# Patient Record
Sex: FEMALE | Race: WHITE | NOT HISPANIC OR LATINO | Employment: OTHER | URBAN - METROPOLITAN AREA
[De-identification: names, ages, dates, MRNs, and addresses within clinical notes are randomized per-mention and may not be internally consistent; named-entity substitution may affect disease eponyms.]

---

## 2017-01-25 ENCOUNTER — OFFICE VISIT (OUTPATIENT)
Dept: FAMILY MEDICINE | Facility: CLINIC | Age: 72
End: 2017-01-25
Payer: COMMERCIAL

## 2017-01-25 ENCOUNTER — HOSPITAL ENCOUNTER (OUTPATIENT)
Dept: MAMMOGRAPHY | Facility: CLINIC | Age: 72
Discharge: HOME | End: 2017-01-25
Attending: INTERNAL MEDICINE | Admitting: INTERNAL MEDICINE
Payer: COMMERCIAL

## 2017-01-25 VITALS
WEIGHT: 186 LBS | RESPIRATION RATE: 20 BRPM | BODY MASS INDEX: 30.99 KG/M2 | TEMPERATURE: 96.6 F | SYSTOLIC BLOOD PRESSURE: 127 MMHG | DIASTOLIC BLOOD PRESSURE: 70 MMHG | HEART RATE: 50 BPM | HEIGHT: 65 IN

## 2017-01-25 DIAGNOSIS — E03.9 ACQUIRED HYPOTHYROIDISM: ICD-10-CM

## 2017-01-25 DIAGNOSIS — K21.9 GASTROESOPHAGEAL REFLUX DISEASE WITHOUT ESOPHAGITIS: ICD-10-CM

## 2017-01-25 DIAGNOSIS — Z12.11 SCREEN FOR COLON CANCER: ICD-10-CM

## 2017-01-25 DIAGNOSIS — Z23 NEED FOR PROPHYLACTIC VACCINATION WITH COMBINED DIPHTHERIA-TETANUS-PERTUSSIS (DTP) VACCINE: ICD-10-CM

## 2017-01-25 DIAGNOSIS — Z23 NEED FOR PROPHYLACTIC VACCINATION AND INOCULATION AGAINST INFLUENZA: ICD-10-CM

## 2017-01-25 DIAGNOSIS — I10 BENIGN ESSENTIAL HYPERTENSION: Primary | Chronic | ICD-10-CM

## 2017-01-25 DIAGNOSIS — F41.1 GAD (GENERALIZED ANXIETY DISORDER): ICD-10-CM

## 2017-01-25 DIAGNOSIS — R53.82 CHRONIC FATIGUE: ICD-10-CM

## 2017-01-25 DIAGNOSIS — I25.10 CORONARY ARTERY DISEASE INVOLVING NATIVE CORONARY ARTERY OF NATIVE HEART WITHOUT ANGINA PECTORIS: Chronic | ICD-10-CM

## 2017-01-25 DIAGNOSIS — Z12.31 ENCOUNTER FOR SCREENING MAMMOGRAM FOR MALIGNANT NEOPLASM OF BREAST: ICD-10-CM

## 2017-01-25 PROCEDURE — 90715 TDAP VACCINE 7 YRS/> IM: CPT | Performed by: INTERNAL MEDICINE

## 2017-01-25 PROCEDURE — 99214 OFFICE O/P EST MOD 30 MIN: CPT | Mod: 25 | Performed by: INTERNAL MEDICINE

## 2017-01-25 PROCEDURE — G0008 ADMIN INFLUENZA VIRUS VAC: HCPCS | Performed by: INTERNAL MEDICINE

## 2017-01-25 PROCEDURE — G0202 SCR MAMMO BI INCL CAD: HCPCS

## 2017-01-25 PROCEDURE — 90472 IMMUNIZATION ADMIN EACH ADD: CPT | Performed by: INTERNAL MEDICINE

## 2017-01-25 PROCEDURE — 90688 IIV4 VACCINE SPLT 0.5 ML IM: CPT | Performed by: INTERNAL MEDICINE

## 2017-01-25 RX ORDER — CLONAZEPAM 0.5 MG/1
0.5 TABLET ORAL DAILY PRN
Qty: 30 TABLET | Refills: 5 | Status: SHIPPED | OUTPATIENT
Start: 2017-01-25 | End: 2017-05-09

## 2017-01-25 RX ORDER — METOPROLOL SUCCINATE 25 MG/1
25 TABLET, EXTENDED RELEASE ORAL DAILY
Qty: 90 TABLET | Refills: 3 | Status: SHIPPED | OUTPATIENT
Start: 2017-01-25 | End: 2018-02-03

## 2017-01-25 RX ORDER — VITAMIN E 268 MG
CAPSULE ORAL
COMMUNITY
End: 2019-12-10

## 2017-01-25 RX ORDER — LEVOTHYROXINE SODIUM 88 UG/1
88 TABLET ORAL DAILY
Qty: 90 TABLET | Refills: 3 | Status: SHIPPED | OUTPATIENT
Start: 2017-01-25 | End: 2018-03-20

## 2017-01-25 RX ORDER — LISINOPRIL 40 MG/1
40 TABLET ORAL DAILY
Qty: 90 TABLET | Refills: 3 | Status: SHIPPED | OUTPATIENT
Start: 2017-01-25 | End: 2017-03-21 | Stop reason: ALTCHOICE

## 2017-01-25 ASSESSMENT — ANXIETY QUESTIONNAIRES
7. FEELING AFRAID AS IF SOMETHING AWFUL MIGHT HAPPEN: NOT AT ALL
GAD7 TOTAL SCORE: 6
3. WORRYING TOO MUCH ABOUT DIFFERENT THINGS: MORE THAN HALF THE DAYS
1. FEELING NERVOUS, ANXIOUS, OR ON EDGE: SEVERAL DAYS
5. BEING SO RESTLESS THAT IT IS HARD TO SIT STILL: NOT AT ALL
2. NOT BEING ABLE TO STOP OR CONTROL WORRYING: SEVERAL DAYS
IF YOU CHECKED OFF ANY PROBLEMS ON THIS QUESTIONNAIRE, HOW DIFFICULT HAVE THESE PROBLEMS MADE IT FOR YOU TO DO YOUR WORK, TAKE CARE OF THINGS AT HOME, OR GET ALONG WITH OTHER PEOPLE: SOMEWHAT DIFFICULT
6. BECOMING EASILY ANNOYED OR IRRITABLE: SEVERAL DAYS

## 2017-01-25 ASSESSMENT — PATIENT HEALTH QUESTIONNAIRE - PHQ9: 5. POOR APPETITE OR OVEREATING: SEVERAL DAYS

## 2017-01-25 NOTE — PROGRESS NOTES
SUBJECTIVE:                                                    Meryl Fournier is a 71 year old female who presents to clinic today for the following health issues:      Hypertension Follow-up      Outpatient blood pressures are being checked at home.  Results are 135-145.    Low Salt Diet: no added salt       Amount of exercise or physical activity: 4-5 days/week for an average of 15-30 minutes    Problems taking medications regularly: No    Medication side effects: none    Diet: low salt  Statin:  Has been on pravastatin (2012), simvastatin (2006), lovastatin (2015), atorvastatin ( 0397-5806 or so).  She had vomiting on lovastatin.  She had a re-trial of this 3 days later and had vomiting again.  lipitor caused myalgias.  --she reports blood pressure has been higher at home, 140s, in last several months.  She never misses a dose.        Chief Complaint   Patient presents with     Recheck Medication     Flu Shot     flu and tetnus   Biggest complaint is fatigue all of 2016.  After eats falls asleep.  Last A1C 2012  --she is not doing dedicated physical exercise.   --she is sleeping well.    --she reports having hard year 2016 'worst year of my life'.  She did not want to discuss in detail.  --she will occasionally have a 'dry feeling' in her chest with mowing lawn, lasts 5 min.  This occurs rarely. Never occurs with rest.  It is associated with shortness of breath but not diaphoresis or nausea.      Current Outpatient Prescriptions   Medication Sig Dispense Refill     Coenzyme Q10 (COQ10 PO) Take 100 mg by mouth       Flaxseed, Linseed, (FLAXSEED OIL) 1000 MG CAPS        clonazePAM (KLONOPIN) 0.5 MG tablet Take 1 tablet (0.5 mg) by mouth daily as needed for anxiety 30 tablet 5     metoprolol (TOPROL-XL) 25 MG 24 hr tablet Take 1 tablet (25 mg) by mouth daily 90 tablet 3     lisinopril (PRINIVIL,ZESTRIL) 20 MG tablet Take 1 tablet (20 mg) by mouth daily 90 tablet 3     ranitidine (ZANTAC) 150 MG tablet Take 1  "tablet (150 mg) by mouth 2 times daily 60 tablet 11     levothyroxine (SYNTHROID, LEVOTHROID) 88 MCG tablet Take 1 tablet (88 mcg) by mouth daily 90 tablet 3     aspirin 81 MG tablet Take  by mouth daily.  3     cholecalciferol 5000 UNITS CAPS Take 1 tablet by mouth daily. 30 capsule      Krill Oil 300 MG CAPS Take 1 tablet by mouth daily.       STATIN NOT PRESCRIBED, INTENTIONAL, Statin not prescribed intentionally due to Intolerance (with supporting documentation of trying a statin at least once within the last 5 years) 0 each 0     Problem list, Medication list, Allergies, and Medical/Social/Surgical histories reviewed in [facility] and updated as appropriate.    ROS:  Constitutional, HEENT, cardiovascular, pulmonary, gi and gu systems are negative, except as otherwise noted.    OBJECTIVE:                                                    /78 mmHg  Pulse 62  Temp(Src) 96.6  F (35.9  C) (Tympanic)  Resp 20  Ht 5' 5\" (1.651 m)  Wt 186 lb (84.369 kg)  BMI 30.95 kg/m2  Body mass index is 30.95 kg/(m^2).  GENERAL APPEARANCE: healthy, alert and no distress  HENT: ear canals and TM's normal and nose and mouth without ulcers or lesions  NECK: no adenopathy, no asymmetry, masses, or scars and thyroid normal to palpation  RESP: lungs clear to auscultation - no rales, rhonchi or wheezes  CV: regular rates and rhythm, normal S1 S2, no S3 or S4 and no murmur, click or rub  LYMPHATICS: trace pedal edema  MS: extremities normal- no gross deformities noted and 5/5 strength bilateral ue/le  NEURO: Normal strength and tone, mentation intact, speech normal, DTR symmetrically normal in lower extremities and gait normal including heel/toe/tandem walking       ASSESSMENT/PLAN:                                                        ICD-10-CM    1. Benign essential hypertension I10 lisinopril (PRINIVIL/ZESTRIL) 40 MG tablet     metoprolol (TOPROL-XL) 25 MG 24 hr tablet   2. Chronic fatigue R53.82    3. Gastroesophageal reflux " disease without esophagitis K21.9 ranitidine (ZANTAC) 150 MG tablet   4. BRISSA (generalized anxiety disorder) F41.1 clonazePAM (KLONOPIN) 0.5 MG tablet   5. Coronary artery disease involving native coronary artery of native heart without angina pectoris I25.10 Basic metabolic panel  (Ca, Cl, CO2, Creat, Gluc, K, Na, BUN)     Lipid Profile (Chol, Trig, HDL, LDL calc)   6. Acquired hypothyroidism E03.9 levothyroxine (SYNTHROID/LEVOTHROID) 88 MCG tablet     TSH with free T4 reflex   7. Screen for colon cancer Z12.11 Fecal colorectal cancer screen (FIT)   8. Need for prophylactic vaccination and inoculation against influenza Z23 FLU VACCINE, 3 YRS +, IM (QUADRIVALENT W/PRESERVATIVES/MULTI-DOSE) [86463]     Vaccine Administration, Initial [31584]   9. Need for prophylactic vaccination with combined diphtheria-tetanus-pertussis (DTP) vaccine Z23 TDAP (ADACEL AGES 11-64)     EA ADD'L VACCINE       1. You are due for a mammogram.  Please call [phone] to schedule.  2. You are due for FIT test for colon cancer screening.  3. Increase lisinopril to 40 mg once daily.    4. Your blood pressure was elevated today.  Please come back to clinic in 2 weeks for (free) Nurse visit to recheck blood pressure.  Please make appointment at the  on your way out today.  Get blood on this day, fasting, to check kidneys and cholesterol.  Drink lots of water prior to blood work.  5. If heartburn flares up severe, can try 1-4 weeks of prilosec (stop ranitidine), then go back to ranitidine  6. For indigestion, try eating smaller more frequent meals.   7. For the fatigue, do dedicated physical exercise, at moderate rate, most days of the week.    Nora Hernandez, DO  [facility]      Injectable Influenza Immunization Documentation    1.  Is the person to be vaccinated sick today?  No    2. Does the person to be vaccinated have an allergy to eggs or to a component of the vaccine?  No    3. Has the person to be  vaccinated today ever had a serious reaction to influenza vaccine in the past?  No    4. Has the person to be vaccinated ever had Guillain-Clinton syndrome?  No     Form completed by Munira Vieira

## 2017-01-25 NOTE — NURSING NOTE
"Chief Complaint   Patient presents with     Recheck Medication     Flu Shot     flu and tetnus       Initial /78 mmHg  Pulse 62  Temp(Src) 96.6  F (35.9  C) (Tympanic)  Resp 20  Ht 5' 5\" (1.651 m)  Wt 186 lb (84.369 kg)  BMI 30.95 kg/m2 Estimated body mass index is 30.95 kg/(m^2) as calculated from the following:    Height as of this encounter: 5' 5\" (1.651 m).    Weight as of this encounter: 186 lb (84.369 kg).  BP completed using cuff size: large  "

## 2017-01-25 NOTE — PATIENT INSTRUCTIONS
Thank you for choosing [facility].  You may be receiving a survey in the mail from Neymar Cunningham regarding your visit today.  Please take a few minutes to complete and return the survey to let us know how we are doing.      If you have questions or concerns, please contact us via [facility] or you can contact your care team at [phone].    Our Clinic hours are:  Monday 6:40 am  to 7:00 pm  Tuesday -Friday 6:40 am to 5:00 pm    The [place] outpatient lab hours are:  Monday - Friday 6:10 am to 4:45 pm  Saturdays 7:00 am to 11:00 am  Appointments are required, call [phone]    If you have clinical questions after hours or would like to schedule an appointment,  call the clinic at [phone].      [facility]      1. You are due for a mammogram.  Please call [phone] to schedule.  2. You are due for FIT test for colon cancer screening.  3. Increase lisinopril to 40 mg once daily.    4. Your blood pressure was elevated today.  Please come back to clinic in 2 weeks for (free) Nurse visit to recheck blood pressure.  Please make appointment at the  on your way out today.  Get blood on this day, fasting, to check kidneys and cholesterol.  Drink lots of water prior to blood work.  5. If heartburn flares up severe, can try 1-4 weeks of prilosec (stop ranitidine), then go back to ranitidine  6. For indigestion, try eating smaller more frequent meals.   7. For the fatigue, do dedicated physical exercise, at moderate rate, most days of the week.

## 2017-01-26 ASSESSMENT — PATIENT HEALTH QUESTIONNAIRE - PHQ9: SUM OF ALL RESPONSES TO PHQ QUESTIONS 1-9: 6

## 2017-01-26 ASSESSMENT — ANXIETY QUESTIONNAIRES: GAD7 TOTAL SCORE: 6

## 2017-02-14 ENCOUNTER — ALLIED HEALTH/NURSE VISIT (OUTPATIENT)
Dept: FAMILY MEDICINE | Facility: CLINIC | Age: 72
End: 2017-02-14
Payer: COMMERCIAL

## 2017-02-14 VITALS — HEART RATE: 53 BPM | OXYGEN SATURATION: 99 % | DIASTOLIC BLOOD PRESSURE: 72 MMHG | SYSTOLIC BLOOD PRESSURE: 122 MMHG

## 2017-02-14 DIAGNOSIS — I10 BENIGN ESSENTIAL HYPERTENSION: Primary | ICD-10-CM

## 2017-02-14 DIAGNOSIS — E03.9 ACQUIRED HYPOTHYROIDISM: ICD-10-CM

## 2017-02-14 DIAGNOSIS — I25.10 CORONARY ARTERY DISEASE INVOLVING NATIVE CORONARY ARTERY OF NATIVE HEART WITHOUT ANGINA PECTORIS: Chronic | ICD-10-CM

## 2017-02-14 DIAGNOSIS — E87.5 SERUM POTASSIUM ELEVATED: Primary | ICD-10-CM

## 2017-02-14 LAB
ANION GAP SERPL CALCULATED.3IONS-SCNC: 4 MMOL/L (ref 3–14)
BUN SERPL-MCNC: 19 MG/DL (ref 7–30)
CALCIUM SERPL-MCNC: 9.4 MG/DL (ref 8.5–10.1)
CHLORIDE SERPL-SCNC: 108 MMOL/L (ref 94–109)
CHOLEST SERPL-MCNC: 252 MG/DL
CO2 SERPL-SCNC: 30 MMOL/L (ref 20–32)
CREAT SERPL-MCNC: 1.39 MG/DL (ref 0.52–1.04)
GFR SERPL CREATININE-BSD FRML MDRD: 37 ML/MIN/1.7M2
GLUCOSE SERPL-MCNC: 107 MG/DL (ref 70–99)
HDLC SERPL-MCNC: 42 MG/DL
LDLC SERPL CALC-MCNC: 160 MG/DL
NONHDLC SERPL-MCNC: 210 MG/DL
POTASSIUM SERPL-SCNC: 5.5 MMOL/L (ref 3.4–5.3)
SODIUM SERPL-SCNC: 142 MMOL/L (ref 133–144)
TRIGL SERPL-MCNC: 252 MG/DL
TSH SERPL DL<=0.005 MIU/L-ACNC: 3.89 MU/L (ref 0.4–4)

## 2017-02-14 PROCEDURE — 80061 LIPID PANEL: CPT | Performed by: INTERNAL MEDICINE

## 2017-02-14 PROCEDURE — 36415 COLL VENOUS BLD VENIPUNCTURE: CPT | Performed by: INTERNAL MEDICINE

## 2017-02-14 PROCEDURE — 80048 BASIC METABOLIC PNL TOTAL CA: CPT | Performed by: INTERNAL MEDICINE

## 2017-02-14 PROCEDURE — 99207 ZZC NO CHARGE NURSE ONLY: CPT

## 2017-02-14 PROCEDURE — 84443 ASSAY THYROID STIM HORMONE: CPT | Performed by: INTERNAL MEDICINE

## 2017-02-14 NOTE — NURSING NOTE
Patient here for blood pressure check.   BP Readings from Last 6 Encounters:   02/14/17 122/72   01/25/17 127/70   07/08/16 143/72   03/02/16 132/75   02/16/16 137/79   12/10/15 150/84     She will follow up in clinic as suggested by PCP.     Laura Squires RN

## 2017-02-14 NOTE — PROGRESS NOTES
The potassium was just a little high. Certain medications like the lisinopril can do this, and it appears that Dr. Hernandez increased this recently to 40mg.  Please make certain not to take any additional potassium supplements. I would recommend you make a lab only visit by the end of the week or by early next week to recheck this and if it is still high make a clinic visit with Dr. Hernandez to talk about options like decreasing the lisinopril back down or adding a different medication.    The kidney Creatinine test is about the same as last time.    The cholesterol is high but a little better than last year.    Covering for provider  Heber Dominguez MD  [facility]

## 2017-02-14 NOTE — MR AVS SNAPSHOT
"              After Visit Summary   2017    Meryl Fournier    MRN: [id number]           Patient Information     Date Of Birth          1945        Visit Information        Provider Department      2017 9:00 AM RENY WALLS/YANETH KUHN [facility]        Today's Diagnoses     Benign essential hypertension    -  1       Follow-ups after your visit        Who to contact     If you have questions or need follow up information about today's clinic visit or your schedule please contact [facility] directly at [phone].  Normal or non-critical lab and imaging results will be communicated to you by MyChart, letter or phone within 4 business days after the clinic has received the results. If you do not hear from us within 7 days, please contact the clinic through [employer] or phone. If you have a critical or abnormal lab result, we will notify you by phone as soon as possible.  Submit refill requests through Lively or call your pharmacy and they will forward the refill request to us. Please allow 3 business days for your refill to be completed.          Additional Information About Your Visit        MyChart Information     Lively lets you send messages to your doctor, view your test results, renew your prescriptions, schedule appointments and more. To sign up, go to [email or web address] . Click on \"Log in\" on the left side of the screen, which will take you to the Welcome page. Then click on \"Sign up Now\" on the right side of the page.     You will be asked to enter the access code listed below, as well as some personal information. Please follow the directions to create your username and password.     Your access code is: [email or web address]  Expires: 2017 10:43 AM     Your access code will  in 90 days. If you need help or a new code, please call your [facility] or [phone].        Care EveryWhere ID     This is your Care EveryWhere ID. This could be used by other " organizations to access your [place] medical records  [id number]        Your Vitals Were     Pulse Pulse Oximetry                53 99%           Blood Pressure from Last 3 Encounters:   02/14/17 122/72   01/25/17 127/70   07/08/16 143/72    Weight from Last 3 Encounters:   01/25/17 186 lb (84.4 kg)   07/08/16 183 lb (83 kg)   03/02/16 178 lb (80.7 kg)              Today, you had the following     No orders found for display       Primary Care Provider Office Phone # Fax #    Nora Hernandez [phone]       [facility] [address] [facility] [address] [facility] [address]        Thank you!     Thank you for choosing [facility]  for your care. Our goal is always to provide you with excellent care. Hearing back from our patients is one way we can continue to improve our services. Please take a few minutes to complete the written survey that you may receive in the mail after your visit with us. Thank you!             Your Updated Medication List - Protect others around you: Learn how to safely use, store and throw away your medicines at www.disposemymeds.org.          This list is accurate as of: 2/14/17 10:23 AM.  Always use your most recent med list.                   Brand Name Dispense Instructions for use    aspirin 81 MG tablet      Take  by mouth daily.       cholecalciferol 5000 UNITS Caps     30 capsule    Take 1 tablet by mouth daily.       clonazePAM 0.5 MG tablet    klonoPIN    30 tablet    Take 1 tablet (0.5 mg) by mouth daily as needed for anxiety       COQ10 PO      Take 100 mg by mouth       flaxseed oil 1000 MG Caps          Krill Oil 300 MG Caps      Take 1 tablet by mouth daily.       levothyroxine 88 MCG tablet    SYNTHROID/LEVOTHROID    90 tablet    Take 1 tablet (88 mcg) by mouth daily       lisinopril 40 MG tablet    PRINIVIL/ZESTRIL    90 tablet    Take 1 tablet (40 mg) by mouth daily       metoprolol 25 MG 24 hr tablet    TOPROL-XL    90 tablet    Take 1  tablet (25 mg) by mouth daily       ranitidine 150 MG tablet    ZANTAC    180 tablet    Take 1 tablet (150 mg) by mouth 2 times daily       STATIN NOT PRESCRIBED (INTENTIONAL)     0 each    Statin not prescribed intentionally due to Intolerance (with supporting documentation of trying a statin at least once within the last 5 years)

## 2017-02-17 DIAGNOSIS — E87.5 SERUM POTASSIUM ELEVATED: ICD-10-CM

## 2017-02-17 LAB
ANION GAP SERPL CALCULATED.3IONS-SCNC: 3 MMOL/L (ref 3–14)
BUN SERPL-MCNC: 20 MG/DL (ref 7–30)
CALCIUM SERPL-MCNC: 9.5 MG/DL (ref 8.5–10.1)
CHLORIDE SERPL-SCNC: 104 MMOL/L (ref 94–109)
CO2 SERPL-SCNC: 31 MMOL/L (ref 20–32)
CREAT SERPL-MCNC: 1.33 MG/DL (ref 0.52–1.04)
GFR SERPL CREATININE-BSD FRML MDRD: 39 ML/MIN/1.7M2
GLUCOSE SERPL-MCNC: 98 MG/DL (ref 70–99)
POTASSIUM SERPL-SCNC: 5.6 MMOL/L (ref 3.4–5.3)
SODIUM SERPL-SCNC: 138 MMOL/L (ref 133–144)

## 2017-02-17 PROCEDURE — 36415 COLL VENOUS BLD VENIPUNCTURE: CPT | Performed by: INTERNAL MEDICINE

## 2017-02-17 PROCEDURE — 80048 BASIC METABOLIC PNL TOTAL CA: CPT | Performed by: INTERNAL MEDICINE

## 2017-03-21 ENCOUNTER — OFFICE VISIT (OUTPATIENT)
Dept: FAMILY MEDICINE | Facility: CLINIC | Age: 72
End: 2017-03-21
Payer: COMMERCIAL

## 2017-03-21 VITALS
WEIGHT: 191 LBS | HEART RATE: 57 BPM | TEMPERATURE: 96.6 F | SYSTOLIC BLOOD PRESSURE: 140 MMHG | BODY MASS INDEX: 31.82 KG/M2 | DIASTOLIC BLOOD PRESSURE: 78 MMHG | HEIGHT: 65 IN

## 2017-03-21 DIAGNOSIS — L65.9 HAIR LOSS: ICD-10-CM

## 2017-03-21 DIAGNOSIS — I10 BENIGN ESSENTIAL HYPERTENSION: Primary | Chronic | ICD-10-CM

## 2017-03-21 LAB
ANION GAP SERPL CALCULATED.3IONS-SCNC: 5 MMOL/L (ref 3–14)
BUN SERPL-MCNC: 17 MG/DL (ref 7–30)
CALCIUM SERPL-MCNC: 9.1 MG/DL (ref 8.5–10.1)
CHLORIDE SERPL-SCNC: 107 MMOL/L (ref 94–109)
CO2 SERPL-SCNC: 29 MMOL/L (ref 20–32)
CREAT SERPL-MCNC: 1.45 MG/DL (ref 0.52–1.04)
GFR SERPL CREATININE-BSD FRML MDRD: 35 ML/MIN/1.7M2
GLUCOSE SERPL-MCNC: 107 MG/DL (ref 70–99)
POTASSIUM SERPL-SCNC: 4.5 MMOL/L (ref 3.4–5.3)
SODIUM SERPL-SCNC: 141 MMOL/L (ref 133–144)

## 2017-03-21 PROCEDURE — 99213 OFFICE O/P EST LOW 20 MIN: CPT | Performed by: INTERNAL MEDICINE

## 2017-03-21 PROCEDURE — 80048 BASIC METABOLIC PNL TOTAL CA: CPT | Performed by: INTERNAL MEDICINE

## 2017-03-21 PROCEDURE — 36415 COLL VENOUS BLD VENIPUNCTURE: CPT | Performed by: INTERNAL MEDICINE

## 2017-03-21 RX ORDER — LISINOPRIL AND HYDROCHLOROTHIAZIDE 12.5; 2 MG/1; MG/1
1 TABLET ORAL DAILY
Qty: 90 TABLET | Refills: 3 | Status: SHIPPED | OUTPATIENT
Start: 2017-03-21 | End: 2018-02-25

## 2017-03-21 NOTE — NURSING NOTE
"Chief Complaint   Patient presents with     Lab Result Notice     recheck potassium and go over thyroid (still having symptoms of hair falling out)       Initial /78 (BP Location: Left arm, Patient Position: Chair, Cuff Size: Adult Large)  Pulse 57  Temp 96.6  F (35.9  C) (Tympanic)  Ht 5' 5\" (1.651 m)  Wt 191 lb (86.6 kg)  BMI 31.78 kg/m2 Estimated body mass index is 31.78 kg/(m^2) as calculated from the following:    Height as of this encounter: 5' 5\" (1.651 m).    Weight as of this encounter: 191 lb (86.6 kg).  Medication Reconciliation: complete  "

## 2017-03-21 NOTE — MR AVS SNAPSHOT
"              After Visit Summary   3/21/2017    Meryl Fournier    MRN: [id number]           Patient Information     Date Of Birth          1945        Visit Information        Provider Department      3/21/2017 9:40 AM Nora Hernandez, DO [facility]        Today's Diagnoses     Benign essential hypertension    -  1    Hair loss          Care Instructions    1. Consider seeing Derm for hair loss concerns if they continue  2. Stop lisinopril.  Start combo pill of lisinopril/HCTZ once daily.  In 2-4 weeks, see RN for free blood pressure check, and get blood work to recheck on potassium.        Follow-ups after your visit        Future tests that were ordered for you today     Open Future Orders        Priority Expected Expires Ordered    Basic metabolic panel Routine  3/21/2018 3/21/2017            Who to contact     If you have questions or need follow up information about today's clinic visit or your schedule please contact [facility] directly at [phone].  Normal or non-critical lab and imaging results will be communicated to you by [email or web address], letter or phone within 4 business days after the clinic has received the results. If you do not hear from us within 7 days, please contact the clinic through eyetokt or phone. If you have a critical or abnormal lab result, we will notify you by phone as soon as possible.  Submit refill requests through [employer] or call your pharmacy and they will forward the refill request to us. Please allow 3 business days for your refill to be completed.          Additional Information About Your Visit        [email or web address] Information     ObjectVideo lets you send messages to your doctor, view your test results, renew your prescriptions, schedule appointments and more. To sign up, go to [email or web address] . Click on \"Log in\" on the left side of the screen, which will take you to the Welcome page. Then click on \"Sign up Now\" on the right side of the page. " "    You will be asked to enter the access code listed below, as well as some personal information. Please follow the directions to create your username and password.     Your access code is: [email or web address]  Expires: 2017 11:43 AM     Your access code will  in 90 days. If you need help or a new code, please call your [place] clinic or [phone].        Care EveryWhere ID     This is your Care EveryWhere ID. This could be used by other organizations to access your [place] medical records  [id number]        Your Vitals Were     Pulse Temperature Height BMI (Body Mass Index)          57 96.6  F (35.9  C) (Tympanic) 5' 5\" (1.651 m) 31.78 kg/m2         Blood Pressure from Last 3 Encounters:   17 140/78   17 122/72   17 127/70    Weight from Last 3 Encounters:   17 191 lb (86.6 kg)   17 186 lb (84.4 kg)   16 183 lb (83 kg)              We Performed the Following     Basic metabolic panel  (Ca, Cl, CO2, Creat, Gluc, K, Na, BUN)          Today's Medication Changes          These changes are accurate as of: 3/21/17 10:05 AM.  If you have any questions, ask your nurse or doctor.               Start taking these medicines.        Dose/Directions    lisinopril-hydrochlorothiazide 20-12.5 MG per tablet   Commonly known as:  PRINZIDE/ZESTORETIC   Used for:  Benign essential hypertension   Started by:  Nora Hernandez DO        Dose:  1 tablet   Take 1 tablet by mouth daily   Quantity:  90 tablet   Refills:  3         Stop taking these medicines if you haven't already. Please contact your care team if you have questions.     lisinopril 40 MG tablet   Commonly known as:  PRINIVIL/ZESTRIL   Stopped by:  Nora Hernandez DO                Where to get your medicines      These medications were sent to [id number] [place] [address], [facility] [address]     Phone:  [phone]     " lisinopril-hydrochlorothiazide 20-12.5 MG per tablet                Primary Care Provider Office Phone # Fax #    Nora Hernandez [phone]       [facility] [address] [facility] [address]        Thank you!     Thank you for choosing [facility]  for your care. Our goal is always to provide you with excellent care. Hearing back from our patients is one way we can continue to improve our services. Please take a few minutes to complete the written survey that you may receive in the mail after your visit with us. Thank you!             Your Updated Medication List - Protect others around you: Learn how to safely use, store and throw away your medicines at www.disposemymeds.org.          This list is accurate as of: 3/21/17 10:05 AM.  Always use your most recent med list.                   Brand Name Dispense Instructions for use    aspirin 81 MG tablet      Take  by mouth daily.       cholecalciferol 5000 UNITS Caps     30 capsule    Take 1 tablet by mouth daily.       clonazePAM 0.5 MG tablet    klonoPIN    30 tablet    Take 1 tablet (0.5 mg) by mouth daily as needed for anxiety       COQ10 PO      Take 100 mg by mouth       flaxseed oil 1000 MG Caps          Krill Oil 300 MG Caps      Take 1 tablet by mouth daily.       levothyroxine 88 MCG tablet    SYNTHROID/LEVOTHROID    90 tablet    Take 1 tablet (88 mcg) by mouth daily       lisinopril-hydrochlorothiazide 20-12.5 MG per tablet    PRINZIDE/ZESTORETIC    90 tablet    Take 1 tablet by mouth daily       metoprolol 25 MG 24 hr tablet    TOPROL-XL    90 tablet    Take 1 tablet (25 mg) by mouth daily       ranitidine 150 MG tablet    ZANTAC    180 tablet    Take 1 tablet (150 mg) by mouth 2 times daily       STATIN NOT PRESCRIBED (INTENTIONAL)     0 each    Statin not prescribed intentionally due to Intolerance (with supporting documentation of trying a statin at least once within the last 5 years)

## 2017-03-21 NOTE — PROGRESS NOTES
SUBJECTIVE:                                                    Meryl Fournier is a 71 year old female who presents to clinic today for the following health issues:      Chief Complaint   Patient presents with     Lab Result Notice     recheck potassium and go over thyroid (still having symptoms of hair falling out)   concerns for thyroid:  Last visit in Jan, she thought she was having thyroid due to hair falling out.  TSH was normal.  She is concerned about weight gain.  She has changed her diet, cut out sugar, increased exercise.  She is walking primarily for exercise. And knows she would not join a gym.  Wt Readings from Last 5 Encounters:   03/21/17 191 lb (86.6 kg)   01/25/17 186 lb (84.4 kg)   07/08/16 183 lb (83 kg)   03/02/16 178 lb (80.7 kg)   02/16/16 182 lb (82.6 kg)     Hypertension:  Blood pressure uncontrolled, so lisinopril was increased from 20 to 40 mg.  She has had mild hyperkalemia with stable kidney function since then.      Current Outpatient Prescriptions   Medication Sig Dispense Refill     Coenzyme Q10 (COQ10 PO) Take 100 mg by mouth       Flaxseed, Linseed, (FLAXSEED OIL) 1000 MG CAPS        levothyroxine (SYNTHROID/LEVOTHROID) 88 MCG tablet Take 1 tablet (88 mcg) by mouth daily 90 tablet 3     ranitidine (ZANTAC) 150 MG tablet Take 1 tablet (150 mg) by mouth 2 times daily 180 tablet 3     lisinopril (PRINIVIL/ZESTRIL) 40 MG tablet Take 1 tablet (40 mg) by mouth daily 90 tablet 3     metoprolol (TOPROL-XL) 25 MG 24 hr tablet Take 1 tablet (25 mg) by mouth daily 90 tablet 3     clonazePAM (KLONOPIN) 0.5 MG tablet Take 1 tablet (0.5 mg) by mouth daily as needed for anxiety 30 tablet 5     aspirin 81 MG tablet Take  by mouth daily.  3     cholecalciferol 5000 UNITS CAPS Take 1 tablet by mouth daily. 30 capsule      Krill Oil 300 MG CAPS Take 1 tablet by mouth daily.       STATIN NOT PRESCRIBED, INTENTIONAL, Statin not prescribed intentionally due to Intolerance (with supporting documentation  "of trying a statin at least once within the last 5 years) 0 each 0       Reviewed and updated as needed this visit by clinical staff  Allergies       Reviewed and updated as needed this visit by Provider         ROS:  Constitutional, HEENT, cardiovascular, pulmonary, gi and gu systems are negative, except as otherwise noted.    OBJECTIVE:                                                    /78 (BP Location: Left arm, Patient Position: Chair, Cuff Size: Adult Large)  Pulse 57  Temp 96.6  F (35.9  C) (Tympanic)  Ht 5' 5\" (1.651 m)  Wt 191 lb (86.6 kg)  BMI 31.78 kg/m2  Body mass index is 31.78 kg/(m^2).  GENERAL APPEARANCE: healthy, alert and no distress    Diagnostic Test Results:  No results found for this or any previous visit (from the past 24 hour(s)).     ASSESSMENT/PLAN:                                                      1. Benign essential hypertension - uncontrolled with hyperkalemia from ACEi.  Add diuretic.  RN BP check in 2-4 weeks, BMP too  - Basic metabolic panel  (Ca, Cl, CO2, Creat, Gluc, K, Na, BUN)  - lisinopril-hydrochlorothiazide (PRINZIDE/ZESTORETIC) 20-12.5 MG per tablet; Take 1 tablet by mouth daily  Dispense: 90 tablet; Refill: 3  - Basic metabolic panel; Future    2. Hair loss - elsie Hernandez, [facility]  "

## 2017-03-21 NOTE — LETTER
[facility]  [address] [facility] [address]  Phone: [phone]    March 22, 2017    Meryl NATALY Shantanu  [address] [facility] [address]          Dear MsMagnus Fournier,    The results of your recent lab tests were:    Potassium is improved, kidney function is slightly worse. Still continue with plan as discussed during office visit.        Enclosed is a copy of these results.  If you have any further questions or problems, please contact our office.        Sincerely,      Nora Hernandez MD / AARON

## 2017-03-21 NOTE — PROGRESS NOTES
Potassium is improved, kidney function is slightly worse. Still continue with plan as discussed during office visit

## 2017-03-21 NOTE — PATIENT INSTRUCTIONS
1. Consider seeing Derm for hair loss concerns if they continue  2. Stop lisinopril.  Start combo pill of lisinopril/HCTZ once daily.  In 2-4 weeks, see RN for free blood pressure check, and get blood work to recheck on potassium.

## 2017-04-06 ENCOUNTER — TELEPHONE (OUTPATIENT)
Dept: FAMILY MEDICINE | Facility: CLINIC | Age: 72
End: 2017-04-06

## 2017-04-06 ENCOUNTER — HOSPITAL ENCOUNTER (EMERGENCY)
Facility: CLINIC | Age: 72
Discharge: HOME | End: 2017-04-06
Attending: FAMILY MEDICINE | Admitting: FAMILY MEDICINE
Payer: COMMERCIAL

## 2017-04-06 ENCOUNTER — APPOINTMENT (OUTPATIENT)
Dept: GENERAL RADIOLOGY | Facility: CLINIC | Age: 72
End: 2017-04-06
Attending: FAMILY MEDICINE
Payer: COMMERCIAL

## 2017-04-06 VITALS
SYSTOLIC BLOOD PRESSURE: 152 MMHG | TEMPERATURE: 97.6 F | DIASTOLIC BLOOD PRESSURE: 86 MMHG | RESPIRATION RATE: 11 BRPM | HEART RATE: 63 BPM | OXYGEN SATURATION: 97 % | BODY MASS INDEX: 30.95 KG/M2 | WEIGHT: 186 LBS

## 2017-04-06 DIAGNOSIS — R07.9 ACUTE CHEST PAIN: ICD-10-CM

## 2017-04-06 LAB
ALBUMIN SERPL-MCNC: 3.6 G/DL (ref 3.4–5)
ALP SERPL-CCNC: 67 U/L (ref 40–150)
ALT SERPL W P-5'-P-CCNC: 15 U/L (ref 0–50)
ANION GAP SERPL CALCULATED.3IONS-SCNC: 8 MMOL/L (ref 3–14)
AST SERPL W P-5'-P-CCNC: 8 U/L (ref 0–45)
BASOPHILS # BLD AUTO: 0.1 10E9/L (ref 0–0.2)
BASOPHILS NFR BLD AUTO: 0.5 %
BILIRUB SERPL-MCNC: 0.7 MG/DL (ref 0.2–1.3)
BUN SERPL-MCNC: 25 MG/DL (ref 7–30)
CALCIUM SERPL-MCNC: 9.4 MG/DL (ref 8.5–10.1)
CHLORIDE SERPL-SCNC: 104 MMOL/L (ref 94–109)
CO2 SERPL-SCNC: 26 MMOL/L (ref 20–32)
CREAT SERPL-MCNC: 1.53 MG/DL (ref 0.52–1.04)
DIFFERENTIAL METHOD BLD: NORMAL
EOSINOPHIL # BLD AUTO: 0.2 10E9/L (ref 0–0.7)
EOSINOPHIL NFR BLD AUTO: 1.6 %
ERYTHROCYTE [DISTWIDTH] IN BLOOD BY AUTOMATED COUNT: 12.4 % (ref 10–15)
GFR SERPL CREATININE-BSD FRML MDRD: 33 ML/MIN/1.7M2
GLUCOSE SERPL-MCNC: 99 MG/DL (ref 70–99)
HCT VFR BLD AUTO: 39.8 % (ref 35–47)
HGB BLD-MCNC: 13.3 G/DL (ref 11.7–15.7)
IMM GRANULOCYTES # BLD: 0 10E9/L (ref 0–0.4)
IMM GRANULOCYTES NFR BLD: 0.1 %
LYMPHOCYTES # BLD AUTO: 2 10E9/L (ref 0.8–5.3)
LYMPHOCYTES NFR BLD AUTO: 19.9 %
MCH RBC QN AUTO: 30.2 PG (ref 26.5–33)
MCHC RBC AUTO-ENTMCNC: 33.4 G/DL (ref 31.5–36.5)
MCV RBC AUTO: 91 FL (ref 78–100)
MONOCYTES # BLD AUTO: 0.9 10E9/L (ref 0–1.3)
MONOCYTES NFR BLD AUTO: 8.7 %
NEUTROPHILS # BLD AUTO: 6.9 10E9/L (ref 1.6–8.3)
NEUTROPHILS NFR BLD AUTO: 69.2 %
PLATELET # BLD AUTO: 214 10E9/L (ref 150–450)
POTASSIUM SERPL-SCNC: 4.5 MMOL/L (ref 3.4–5.3)
PROT SERPL-MCNC: 7.8 G/DL (ref 6.8–8.8)
RBC # BLD AUTO: 4.4 10E12/L (ref 3.8–5.2)
SODIUM SERPL-SCNC: 138 MMOL/L (ref 133–144)
TROPONIN I SERPL-MCNC: NORMAL UG/L (ref 0–0.04)
WBC # BLD AUTO: 10 10E9/L (ref 4–11)

## 2017-04-06 PROCEDURE — 99285 EMERGENCY DEPT VISIT HI MDM: CPT | Mod: 25

## 2017-04-06 PROCEDURE — 25000125 ZZHC RX 250: Performed by: FAMILY MEDICINE

## 2017-04-06 PROCEDURE — 93005 ELECTROCARDIOGRAM TRACING: CPT | Mod: 76

## 2017-04-06 PROCEDURE — 25000132 ZZH RX MED GY IP 250 OP 250 PS 637: Performed by: FAMILY MEDICINE

## 2017-04-06 PROCEDURE — 84484 ASSAY OF TROPONIN QUANT: CPT | Performed by: FAMILY MEDICINE

## 2017-04-06 PROCEDURE — 93005 ELECTROCARDIOGRAM TRACING: CPT

## 2017-04-06 PROCEDURE — 80053 COMPREHEN METABOLIC PANEL: CPT | Performed by: FAMILY MEDICINE

## 2017-04-06 PROCEDURE — 99285 EMERGENCY DEPT VISIT HI MDM: CPT | Mod: 25 | Performed by: FAMILY MEDICINE

## 2017-04-06 PROCEDURE — 71020 XR CHEST 2 VW: CPT

## 2017-04-06 PROCEDURE — 93010 ELECTROCARDIOGRAM REPORT: CPT | Performed by: FAMILY MEDICINE

## 2017-04-06 PROCEDURE — 85025 COMPLETE CBC W/AUTO DIFF WBC: CPT | Performed by: FAMILY MEDICINE

## 2017-04-06 RX ADMIN — LIDOCAINE HYDROCHLORIDE 30 ML: 20 SOLUTION ORAL; TOPICAL at 14:18

## 2017-04-06 NOTE — ED PROVIDER NOTES
HPI  Patient is a 71-year-old female presenting with chest pain.  She has a known history of hypertension, chronic kidney disease stage III.  She has reflux.  She has coronary artery disease with her last work up being in 2010.  They described moderate reversible defects.  She takes an aspirin 81 mg.  She takes Synthroid, lisinopril/hydrochlorothiazide, metoprolol.  She uses E-cigarettes.  She does not drink alcohol.  No drug use.    The patient has left-sided chest pain.  The pain has been coming on over the past few days.  It can be severe at times.  It happens exclusively at night when she lies flat.  No associated symptoms.  It will recur if she lies flat during the day.  Activity does not elicit pain or shortness of breath or nausea.  No cough or fever.  No palpitations.  No leg pain or swelling.    ROS: All other review of systems are negative other than that noted above.   PMH: Reviewed.  SH: Reviewed.  FH: Reviewed.      PHYSICAL  /77  Pulse 63  Temp 97.6  F (36.4  C) (Oral)  Resp 11  Wt 84.4 kg (186 lb)  SpO2 99%  BMI 30.95 kg/m2  General: Patient is alert and in no distress.   Neurological: Alert.  Moving upper and lower extremities equally, bilaterally.  Head / Neck: Atraumatic.  Ears: Not done.  Eyes: Pupils are equal, round, and reactive.  Normal conjunctiva.  Nose: Midline.  No epistaxis.  Mouth / Throat: No ulcerations or lesions.  Upper pharynx is not erythematous.  Moist.  Respiratory: No respiratory distress. CTA B.  Cardiovascular: Regular rhythm.  Peripheral extremities are warm.  No edema.  No calf tenderness.  Abdomen / Pelvis: Not tender.  No distention.  Soft throughout.  Genitalia: Not done.  Musculoskeletal: No tenderness over major muscles and joints.  Skin: No evidence of rash or trauma.        PHYSICIAN  1207.  The patient presents with chest pain that is mechanical in nature and only present when she lies flat.  This sounds musculoskeletal.  EKG is documented below and  unremarkable.  Laboratories pending.  Chest x-ray ordered.     Labs Ordered and Resulted from Time of ED Arrival Up to the Time of Departure from the ED   COMPREHENSIVE METABOLIC PANEL - Abnormal; Notable for the following:        Result Value    Creatinine 1.53 (*)     GFR Estimate 33 (*)     GFR Estimate If Black 40 (*)     All other components within normal limits   CBC WITH PLATELETS DIFFERENTIAL   TROPONIN I       IMAGING  CXR.  Images reviewed by me.  Radiology report was also reviewed.      EKG  (1155)   Rate: 60     Rhythm: sinus     Axis: nl  Intervals: VT (12-2) 150, QRS (<12) 97, QTc (>5) 390  P wave: nl     QRS complex: nl  ST segment / T-wave: nl  Conclusion: nl    EKG  (1305)   Rate: 53     Rhythm: sinus     Axis: nl  Intervals: VT (12-2) 158, QRS (<12) 96, QTc (>5) 404  P wave: nl     QRS complex: nl  ST segment / T-wave: nl  Conclusion: nl    1345.  The patient has mild left shoulder and chest pain.  It comes on when she reclines.  A GI cocktail will be given.  Her workup appears unremarkable.  Low concern for acute coronary syndrome.  Though concern for vascular pathology.  Chest x-ray is unremarkable.  Follow up next week for reevaluation.      IMPRESSION    ICD-10-CM    1. Acute chest pain R07.9            Critical Care Time:  none   Trauma:      CMS Coding:  None         Jai Valdovinos MD  04/06/17 1351       Jai Valdovinos MD  04/06/17 1352

## 2017-04-06 NOTE — TELEPHONE ENCOUNTER
Reason for call:  Patient reporting a symptom    Symptom or request: Left Shoulder Arm Pain    Duration (how long have symptoms been present): 3 days    Have you been treated for this before? No    Additional comments: When she lays down at night she gets pain in her left shoulder that goes into her arm.  She said that it feels like a pulled muscle. The pain goes away during the day.  Please advise.    Phone Number patient can be reached at:  Home number on file [phone] (home)    Best Time:  any    Can we leave a detailed message on this number:  YES    Call taken on 4/6/2017 at 9:13 AM by Sara Pitt

## 2017-04-06 NOTE — TELEPHONE ENCOUNTER
S-(situation): patient called and is concerned about left shoulder pain    B-(background): Patient does have a history of fibromyalgia and anxiety.    A-(assessment): patient reports the last 2 nights she has had left shoulder pain, that starts at the shoulder and extends down to the elbow.  Patient denies any neck pain, jaw pain or chest pain.  Patient reports the pain is only at night. Patient reports during the day the pain did go away.  Patient reports when she elevated the head of the bed the pain did improve.  Patient reports the pain does improve with OTC meds.  Patient denies any SOB, fevers, abdominal pain or sweating.  Patient reports she has had symptoms like this in the past.  Patient does not recall any injury to the area.  Patient states the pain does not worsen with movement .  Patient is able to have good ROM.  Pain is the same with movement.      R-(recommendations): patient would like to be seen.  Patient denied any appointments in clinic.  Patient would like to go to UC/ER to be elevated.  Patient will go there this morning.    Eugenia GOYAL RN

## 2017-04-06 NOTE — ED AVS SNAPSHOT
[facility] Emergency Department    [address] [address]    Phone:  [phone]    Fax:  [phone]                                       Meryl Fournier   MRN: [id number]    Department:  [facility] Emergency Department   Date of Visit:  4/6/2017           After Visit Summary Signature Page     I have received my discharge instructions, and my questions have been answered. I have discussed any challenges I see with this plan with the nurse or doctor.    ..........................................................................................................................................  Patient/Patient Representative Signature      ..........................................................................................................................................  Patient Representative Print Name and Relationship to Patient    ..................................................               ................................................  Date                                            Time    ..........................................................................................................................................  Reviewed by Signature/Title    ...................................................              ..............................................  Date                                                            Time

## 2017-04-06 NOTE — ED AVS SNAPSHOT
[facility] Emergency Department    [address] [address]    Phone:  [phone]    Fax:  [phone]                                       Meryl Fournier   MRN: [id number]    Department:  [facility] Emergency Department   Date of Visit:  4/6/2017           Patient Information     Date Of Birth          1945        Your diagnoses for this visit were:     Acute chest pain        You were seen by Jai Valdovinos MD.        Discharge Instructions       Return to the Emergency Room if the following occurs:     Worsened pain, worsened breathing, fever >101, or for any concern at anytime.    Or, follow-up with the following provider as we discussed:     Return to your primary doctor next week for reevaluation.    Medications discussed:    Consider Maalox/Mylanta/Tums for acute pain.  Avoid caffeine, smoking, chewing tobacco, ibuprofen/advil/aleve, alcohol, eating within 2-3 hours of bed.    If you received pain-relieving or sedating medication during your time in the ER, avoid alcohol, driving automobiles, or working with machinery.  Also, a responsible adult must stay with you.      If you had X-rays or labs done we will attempt to contact you if there is a change needed in your care.      Call the Nurse Advice Line at [phone] or [phone] for any concern at anytime.      24 Hour Appointment Hotline       To make an appointment at any [facility], call [phone] ([phone]). If you don't have a family doctor or clinic, we will help you find one. [place] clinics are conveniently located to serve the needs of you and your family.             Review of your medicines      Our records show that you are taking the medicines listed below. If these are incorrect, please call your family doctor or clinic.        Dose / Directions Last dose taken    aspirin 81 MG tablet        Take  by mouth daily.   Refills:  3        cholecalciferol 5000 UNITS Caps   Dose:  1  tablet   Quantity:  30 capsule        Take 1 tablet by mouth daily.   Refills:  0        clonazePAM 0.5 MG tablet   Commonly known as:  klonoPIN   Dose:  0.5 mg   Quantity:  30 tablet        Take 1 tablet (0.5 mg) by mouth daily as needed for anxiety   Refills:  5        COQ10 PO   Dose:  100 mg        Take 100 mg by mouth   Refills:  0        flaxseed oil 1000 MG Caps        Refills:  0        Krill Oil 300 MG Caps   Dose:  1 tablet        Take 1 tablet by mouth daily.   Refills:  0        levothyroxine 88 MCG tablet   Commonly known as:  SYNTHROID/LEVOTHROID   Dose:  88 mcg   Quantity:  90 tablet        Take 1 tablet (88 mcg) by mouth daily   Refills:  3        lisinopril-hydrochlorothiazide 20-12.5 MG per tablet   Commonly known as:  PRINZIDE/ZESTORETIC   Dose:  1 tablet   Quantity:  90 tablet        Take 1 tablet by mouth daily   Refills:  3        metoprolol 25 MG 24 hr tablet   Commonly known as:  TOPROL-XL   Dose:  25 mg   Quantity:  90 tablet        Take 1 tablet (25 mg) by mouth daily   Refills:  3        ranitidine 150 MG tablet   Commonly known as:  ZANTAC   Dose:  150 mg   Quantity:  180 tablet        Take 1 tablet (150 mg) by mouth 2 times daily   Refills:  3        STATIN NOT PRESCRIBED (INTENTIONAL)   Quantity:  0 each        Statin not prescribed intentionally due to Intolerance (with supporting documentation of trying a statin at least once within the last 5 years)   Refills:  0                Procedures and tests performed during your visit     CBC with platelets, differential    Comprehensive metabolic panel    EKG 12 lead    EKG 12-lead, tracing only    Troponin I    XR Chest 2 Views      Orders Needing Specimen Collection     None      Pending Results     No orders found from 4/4/2017 to 4/7/2017.            Pending Culture Results     No orders found from 4/4/2017 to 4/7/2017.            Test Results From Your Hospital Stay        4/6/2017 12:46 PM      Component Results     Component Value Ref  Range & Units Status    WBC 10.0 4.0 - 11.0 10e9/L Final    RBC Count 4.40 3.8 - 5.2 10e12/L Final    Hemoglobin 13.3 11.7 - 15.7 g/dL Final    Hematocrit 39.8 35.0 - 47.0 % Final    MCV 91 78 - 100 fl Final    MCH 30.2 26.5 - 33.0 pg Final    MCHC 33.4 31.5 - 36.5 g/dL Final    RDW 12.4 10.0 - 15.0 % Final    Platelet Count 214 150 - 450 10e9/L Final    Diff Method Automated Method  Final    % Neutrophils 69.2 % Final    % Lymphocytes 19.9 % Final    % Monocytes 8.7 % Final    % Eosinophils 1.6 % Final    % Basophils 0.5 % Final    % Immature Granulocytes 0.1 % Final    Absolute Neutrophil 6.9 1.6 - 8.3 10e9/L Final    Absolute Lymphocytes 2.0 0.8 - 5.3 10e9/L Final    Absolute Monocytes 0.9 0.0 - 1.3 10e9/L Final    Absolute Eosinophils 0.2 0.0 - 0.7 10e9/L Final    Absolute Basophils 0.1 0.0 - 0.2 10e9/L Final    Abs Immature Granulocytes 0.0 0 - 0.4 10e9/L Final         4/6/2017  1:03 PM      Component Results     Component Value Ref Range & Units Status    Sodium 138 133 - 144 mmol/L Final    Potassium 4.5 3.4 - 5.3 mmol/L Final    Chloride 104 94 - 109 mmol/L Final    Carbon Dioxide 26 20 - 32 mmol/L Final    Anion Gap 8 3 - 14 mmol/L Final    Glucose 99 70 - 99 mg/dL Final    Urea Nitrogen 25 7 - 30 mg/dL Final    Creatinine 1.53 (H) 0.52 - 1.04 mg/dL Final    GFR Estimate 33 (L) >60 mL/min/1.7m2 Final    Non  GFR Calc    GFR Estimate If Black 40 (L) >60 mL/min/1.7m2 Final    African American GFR Calc    Calcium 9.4 8.5 - 10.1 mg/dL Final    Bilirubin Total 0.7 0.2 - 1.3 mg/dL Final    Albumin 3.6 3.4 - 5.0 g/dL Final    Protein Total 7.8 6.8 - 8.8 g/dL Final    Alkaline Phosphatase 67 40 - 150 U/L Final    ALT 15 0 - 50 U/L Final    AST 8 0 - 45 U/L Final         4/6/2017  1:04 PM      Component Results     Component Value Ref Range & Units Status    Troponin I ES  0.000 - 0.045 ug/L Final    <0.015  The 99th percentile for upper reference range is 0.045 ug/L.  Troponin values in   the range  "of 0.045 - 0.120 ug/L may be associated with risks of adverse   clinical events.           2017 12:58 PM      Narrative     XR CHEST 2 VW 2017 12:55 PM     HISTORY: chest pain    COMPARISON: 2010        Impression     IMPRESSION: The cardiac silhouette and pulmonary vasculature are  normal. The lungs are clear. No evidence of pneumothorax or pleural  effusion.    MOHAMUD KRISHNA MD                Thank you for choosing [facility]       Thank you for choosing [facility] for your care. Our goal is always to provide you with excellent care. Hearing back from our patients is one way we can continue to improve our services. Please take a few minutes to complete the written survey that you may receive in the mail after you visit with us. Thank you!        [employer] Information     [employer] lets you send messages to your doctor, view your test results, renew your prescriptions, schedule appointments and more. To sign up, go to [email or web address] . Click on \"Log in\" on the left side of the screen, which will take you to the Welcome page. Then click on \"Sign up Now\" on the right side of the page.     You will be asked to enter the access code listed below, as well as some personal information. Please follow the directions to create your username and password.     Your access code is: [email or web address]  Expires: 2017 11:43 AM     Your access code will  in 90 days. If you need help or a new code, please call your [facility] clinic or [phone].        Care EveryWhere ID     This is your Care EveryWhere ID. This could be used by other organizations to access your [facility] medical records  [id number]        After Visit Summary       This is your record. Keep this with you and show to your community pharmacist(s) and doctor(s) at your next visit.                  "

## 2017-04-06 NOTE — TELEPHONE ENCOUNTER
2 nights  Shoulder pain  At night when lays down  Helps to raise the bed  Goes away during the day  No neck pain or jaw  Shoulder down to the elbow  No SOB  Patient has had symtpoms  Asa does help  Does not increase with movement  Does not recall any injury  No fevers  Patient has fibromyalgia  No abdominal pain   some e

## 2017-04-06 NOTE — DISCHARGE INSTRUCTIONS
Return to the Emergency Room if the following occurs:     Worsened pain, worsened breathing, fever >101, or for any concern at anytime.    Or, follow-up with the following provider as we discussed:     Return to your primary doctor next week for reevaluation.    Medications discussed:    Consider Maalox/Mylanta/Tums for acute pain.  Avoid caffeine, smoking, chewing tobacco, ibuprofen/advil/aleve, alcohol, eating within 2-3 hours of bed.    If you received pain-relieving or sedating medication during your time in the ER, avoid alcohol, driving automobiles, or working with machinery.  Also, a responsible adult must stay with you.      If you had X-rays or labs done we will attempt to contact you if there is a change needed in your care.      Call the Nurse Advice Line at [phone] or [phone] for any concern at anytime.

## 2017-04-14 ENCOUNTER — TELEPHONE (OUTPATIENT)
Dept: FAMILY MEDICINE | Facility: CLINIC | Age: 72
End: 2017-04-14

## 2017-04-14 NOTE — TELEPHONE ENCOUNTER
Talked to patient and she will do her FIT test just forgets to do.  She will send in and will be in next 2 weeks to check if potassium is OK. Was in ER so will be following up.    Panel Management Review      Patient has the following on her problem list: None      Composite cancer screening  Chart review shows that this patient is due/due soon for the following Fecal Colorectal (FIT)  Summary:    Patient is due/failing the following:   FIT    Action needed:   Patient needs office visit for follow up from ER.    Type of outreach:    Phone, spoke to patient.  and she is doing FIT test and will see us in 2 weeks to follow up from ER.    Questions for provider review:    None                                                                                                                                    Munira Vieira     Chart routed to Care Team .

## 2017-04-18 PROCEDURE — 82274 ASSAY TEST FOR BLOOD FECAL: CPT | Performed by: INTERNAL MEDICINE

## 2017-04-19 DIAGNOSIS — Z12.11 SCREEN FOR COLON CANCER: ICD-10-CM

## 2017-04-19 LAB — HEMOCCULT STL QL IA: NEGATIVE

## 2017-05-09 ENCOUNTER — OFFICE VISIT (OUTPATIENT)
Dept: FAMILY MEDICINE | Facility: CLINIC | Age: 72
End: 2017-05-09
Payer: COMMERCIAL

## 2017-05-09 VITALS
HEART RATE: 63 BPM | BODY MASS INDEX: 31.68 KG/M2 | DIASTOLIC BLOOD PRESSURE: 66 MMHG | WEIGHT: 190.13 LBS | SYSTOLIC BLOOD PRESSURE: 127 MMHG | TEMPERATURE: 97.9 F | RESPIRATION RATE: 16 BRPM | HEIGHT: 65 IN

## 2017-05-09 DIAGNOSIS — N18.30 CKD (CHRONIC KIDNEY DISEASE) STAGE 3, GFR 30-59 ML/MIN (H): Chronic | ICD-10-CM

## 2017-05-09 DIAGNOSIS — I10 BENIGN ESSENTIAL HYPERTENSION: Primary | Chronic | ICD-10-CM

## 2017-05-09 DIAGNOSIS — F41.1 GAD (GENERALIZED ANXIETY DISORDER): ICD-10-CM

## 2017-05-09 PROCEDURE — 99213 OFFICE O/P EST LOW 20 MIN: CPT | Performed by: INTERNAL MEDICINE

## 2017-05-09 RX ORDER — CLONAZEPAM 0.5 MG/1
0.5 TABLET ORAL DAILY PRN
Qty: 30 TABLET | Refills: 5 | Status: SHIPPED | OUTPATIENT
Start: 2017-05-09 | End: 2017-11-24

## 2017-05-09 NOTE — MR AVS SNAPSHOT
After Visit Summary   5/9/2017    Meryl Fournier    MRN: [id number]           Patient Information     Date Of Birth          1945        Visit Information        Provider Department      5/9/2017 10:00 AM Nora Hernandez,  [facility]        Today's Diagnoses     Benign essential hypertension    -  1    BRISSA (generalized anxiety disorder)        CKD (chronic kidney disease) stage 3, GFR 30-59 ml/min          Care Instructions    1. Recheck in 6 months, sooner if needed. Get non-fasting blood and urine test 2-3 days prior to visit.  2. Continue current blood pressure medications.  3. Avoid NSAIDs (Ibuprofen, advil, aleve) and stay hydrated to help protect kidneys.          Follow-ups after your visit        Future tests that were ordered for you today     Open Future Orders        Priority Expected Expires Ordered    Basic metabolic panel Routine 8/8/2017 11/7/2017 5/9/2017    **Parathyroid Hormone Intact FUTURE 2mo Routine 7/8/2017 9/6/2017 5/9/2017    **Vitamin D Deficiency FUTURE anytime Routine 5/9/2017 5/9/2018 5/9/2017    **UA reflex to Microscopic FUTURE 2mo Routine 7/8/2017 9/6/2017 5/9/2017    CBC with platelets Routine  5/9/2018 5/9/2017            Who to contact     If you have questions or need follow up information about today's clinic visit or your schedule please contact [facility] directly at [phone].  Normal or non-critical lab and imaging results will be communicated to you by MyChart, letter or phone within 4 business days after the clinic has received the results. If you do not hear from us within 7 days, please contact the clinic through MyChart or phone. If you have a critical or abnormal lab result, we will notify you by phone as soon as possible.  Submit refill requests through [employer] or call your pharmacy and they will forward the refill request to us. Please allow 3 business days for your refill to be completed.          Additional  "Information About Your Visit        MyChart Information     [employer] lets you send messages to your doctor, view your test results, renew your prescriptions, schedule appointments and more. To sign up, go to [email or web address] . Click on \"Log in\" on the left side of the screen, which will take you to the Welcome page. Then click on \"Sign up Now\" on the right side of the page.     You will be asked to enter the access code listed below, as well as some personal information. Please follow the directions to create your username and password.     Your access code is: [email or web address]  Expires: 2017 10:29 AM     Your access code will  in 90 days. If you need help or a new code, please call your [facility] clinic or [phone].        Care EveryWhere ID     This is your Care EveryWhere ID. This could be used by other organizations to access your [facility] medical records  [id number]        Your Vitals Were     Pulse Temperature Respirations Height BMI (Body Mass Index)       63 97.9  F (36.6  C) (Tympanic) 16 5' 5\" (1.651 m) 31.64 kg/m2        Blood Pressure from Last 3 Encounters:   17 127/66   17 152/86   17 140/78    Weight from Last 3 Encounters:   17 190 lb 2 oz (86.2 kg)   17 186 lb (84.4 kg)   17 191 lb (86.6 kg)                 Where to get your medicines      Some of these will need a paper prescription and others can be bought over the counter.  Ask your nurse if you have questions.     Bring a paper prescription for each of these medications     clonazePAM 0.5 MG tablet          Primary Care Provider Office Phone # Fax #    Nora Dulce Maria Hernandez [phone]       [facility] [address] [address]        Thank you!     Thank you for choosing [facility]  for your care. Our goal is always to provide you with excellent care. Hearing back from our patients is one way we can continue to improve our services. " Please take a few minutes to complete the written survey that you may receive in the mail after your visit with us. Thank you!             Your Updated Medication List - Protect others around you: Learn how to safely use, store and throw away your medicines at www.disposemymeds.org.          This list is accurate as of: 5/9/17 10:30 AM.  Always use your most recent med list.                   Brand Name Dispense Instructions for use    aspirin 81 MG tablet      Take  by mouth daily.       cholecalciferol 5000 UNITS Caps     30 capsule    Take 1 tablet by mouth daily.       clonazePAM 0.5 MG tablet    klonoPIN    30 tablet    Take 1 tablet (0.5 mg) by mouth daily as needed for anxiety       COQ10 PO      Take 100 mg by mouth       flaxseed oil 1000 MG Caps          Krill Oil 300 MG Caps      Take 1 tablet by mouth daily.       levothyroxine 88 MCG tablet    SYNTHROID/LEVOTHROID    90 tablet    Take 1 tablet (88 mcg) by mouth daily       lisinopril-hydrochlorothiazide 20-12.5 MG per tablet    PRINZIDE/ZESTORETIC    90 tablet    Take 1 tablet by mouth daily       metoprolol 25 MG 24 hr tablet    TOPROL-XL    90 tablet    Take 1 tablet (25 mg) by mouth daily       ranitidine 150 MG tablet    ZANTAC    180 tablet    Take 1 tablet (150 mg) by mouth 2 times daily       STATIN NOT PRESCRIBED (INTENTIONAL)     0 each    Statin not prescribed intentionally due to Intolerance (with supporting documentation of trying a statin at least once within the last 5 years)

## 2017-05-09 NOTE — PROGRESS NOTES
SUBJECTIVE:                                                    Meryl Fournier is a 71 year old female who presents to clinic today for the following health issues:      Hypertension Follow-up      Outpatient blood pressures are being checked at home.  Results are 120-140/70.    Low Salt Diet: low salt       Amount of exercise or physical activity: patient is starting to become more active now that the weather has become warmer.    Problems taking medications regularly: No    Medication side effects: none    Diet: regular (no restrictions), low salt and low fat/cholesterol    She is taking metoprolol 25 mg at night    Fatigue:  Thinks due to clonazepam.      Exercise - walking, doing more with spring weather.    chest pain - had ER visit 4/6.  Work-up negative, felt no further testing was needed.  ER thought it was GI.  No further episodes.  The GI cocktail helped significantly.        Current Outpatient Prescriptions   Medication Sig Dispense Refill     clonazePAM (KLONOPIN) 0.5 MG tablet Take 1 tablet (0.5 mg) by mouth daily as needed for anxiety 30 tablet 5     lisinopril-hydrochlorothiazide (PRINZIDE/ZESTORETIC) 20-12.5 MG per tablet Take 1 tablet by mouth daily 90 tablet 3     Coenzyme Q10 (COQ10 PO) Take 100 mg by mouth       Flaxseed, Linseed, (FLAXSEED OIL) 1000 MG CAPS        levothyroxine (SYNTHROID/LEVOTHROID) 88 MCG tablet Take 1 tablet (88 mcg) by mouth daily 90 tablet 3     metoprolol (TOPROL-XL) 25 MG 24 hr tablet Take 1 tablet (25 mg) by mouth daily 90 tablet 3     aspirin 81 MG tablet Take  by mouth daily.  3     cholecalciferol 5000 UNITS CAPS Take 1 tablet by mouth daily. 30 capsule      Krill Oil 300 MG CAPS Take 1 tablet by mouth daily.       ranitidine (ZANTAC) 150 MG tablet Take 1 tablet (150 mg) by mouth 2 times daily 180 tablet 3     [DISCONTINUED] clonazePAM (KLONOPIN) 0.5 MG tablet Take 1 tablet (0.5 mg) by mouth daily as needed for anxiety 30 tablet 5     STATIN NOT PRESCRIBED,  "INTENTIONAL, Statin not prescribed intentionally due to Intolerance (with supporting documentation of trying a statin at least once within the last 5 years) 0 each 0       Reviewed and updated as needed this visit by clinical staff  Tobacco  Allergies  Meds  Problems  Med Hx  Surg Hx  Fam Hx  Soc Hx        Reviewed and updated as needed this visit by Provider  Allergies  Meds  Problems         ROS:  Constitutional, HEENT, cardiovascular, pulmonary, gi and gu systems are negative, except as otherwise noted.    OBJECTIVE:                                                    /66  Pulse 63  Temp 97.9  F (36.6  C) (Tympanic)  Resp 16  Ht 5' 5\" (1.651 m)  Wt 190 lb 2 oz (86.2 kg)  BMI 31.64 kg/m2  Body mass index is 31.64 kg/(m^2).  GENERAL APPEARANCE: healthy, alert and no distress    Diagnostic Test Results:  Results for orders placed or performed in visit on 04/19/17   Fecal colorectal cancer screen (FIT)   Result Value Ref Range    Occult Blood Scn FIT Negative NEG        ASSESSMENT/PLAN:                                                        ICD-10-CM    1. Benign essential hypertension I10    2. BRISSA (generalized anxiety disorder) F41.1 clonazePAM (KLONOPIN) 0.5 MG tablet   3. CKD (chronic kidney disease) stage 3, GFR 30-59 ml/min N18.3 Basic metabolic panel     **Parathyroid Hormone Intact FUTURE 2mo     **Vitamin D Deficiency FUTURE anytime     **UA reflex to Microscopic FUTURE 2mo     CBC with platelets       1. Recheck in 6 months, sooner if needed. Get non-fasting blood and urine test 2-3 days prior to visit.  2. Continue current blood pressure medications.  3. Avoid NSAIDs (Ibuprofen, advil, aleve) and stay hydrated to help protect kidneys.      Nora Hernandez, DO  [facility]  "

## 2017-05-09 NOTE — PATIENT INSTRUCTIONS
1. Recheck in 6 months, sooner if needed. Get non-fasting blood and urine test 2-3 days prior to visit.  2. Continue current blood pressure medications.  3. Avoid NSAIDs (Ibuprofen, advil, aleve) and stay hydrated to help protect kidneys.

## 2017-11-24 DIAGNOSIS — F41.1 GAD (GENERALIZED ANXIETY DISORDER): ICD-10-CM

## 2017-11-27 NOTE — TELEPHONE ENCOUNTER
Routing refill request to provider for review/approval because:  Drug not on the [facility] refill protocol     Sulma Winters RN

## 2017-11-28 RX ORDER — CLONAZEPAM 0.5 MG/1
0.5 TABLET ORAL DAILY PRN
Qty: 30 TABLET | Refills: 5 | Status: SHIPPED | OUTPATIENT
Start: 2017-11-28 | End: 2018-05-01 | Stop reason: ALTCHOICE

## 2018-01-25 DIAGNOSIS — K21.9 GASTROESOPHAGEAL REFLUX DISEASE WITHOUT ESOPHAGITIS: ICD-10-CM

## 2018-01-31 NOTE — TELEPHONE ENCOUNTER
"Requested Prescriptions   Pending Prescriptions Disp Refills     ranitidine (ZANTAC) 150 MG tablet [Pharmacy Med Name: RANITIDINE 150MG TAB]  Last Written Prescription Date:  01/25/2017  Last Fill Quantity: 180,  # refills: 3   Last Office Visit with G, P or [facility] prescribing provider:  05/09/2017   Future Office Visit:      180 tablet      Sig: TAKE 1 TABLET BY MOUTH TWICE DAILY    H2 Blockers Protocol Passed    1/25/2018  1:02 AM       Passed - Patient is age 12 or older       Passed - Recent or future visit with authorizing provider's specialty    Patient had office visit in the last year or has a visit in the next 30 days with authorizing provider.  See \"Patient Info\" tab in inbasket, or \"Choose Columns\" in Meds & Orders section of the refill encounter.             Edgardo ESCOBAR (R)    "
"Requested Prescriptions   Signed Prescriptions Disp Refills     ranitidine (ZANTAC) 150 MG tablet 180 tablet 0     Sig: TAKE 1 TABLET BY MOUTH TWICE DAILY    H2 Blockers Protocol Passed    1/25/2018 10:12 AM       Passed - Patient is age 12 or older       Passed - Recent or future visit with authorizing provider's specialty    Patient had office visit in the last year or has a visit in the next 30 days with authorizing provider.  See \"Patient Info\" tab in inbasket, or \"Choose Columns\" in Meds & Orders section of the refill encounter.             Symone CHERY Rn    "
IV discontinued, cath removed intact

## 2018-02-03 DIAGNOSIS — I10 BENIGN ESSENTIAL HYPERTENSION: Chronic | ICD-10-CM

## 2018-02-05 NOTE — TELEPHONE ENCOUNTER
"Requested Prescriptions   Pending Prescriptions Disp Refills     metoprolol succinate (TOPROL-XL) 25 MG 24 hr tablet [Pharmacy Med Name: METOPROLOL SUCC 25MG ER TAB]  Last Written Prescription Date:  01/25/17  Last Fill Quantity: 90,  # refills: 3   Last Office Visit with [facility] provider:  05/09/17   Future Office Visit:      90 tablet      Sig: TAKE 1 TABLET BY MOUTH DAILY    Beta-Blockers Protocol Passed    2/3/2018  1:03 AM       Passed - Blood pressure under 140/90    BP Readings from Last 3 Encounters:   05/09/17 127/66   04/06/17 152/86   03/21/17 140/78          Passed - Patient is age 6 or older       Passed - Recent or future visit with authorizing provider's specialty    Patient had office visit in the last year or has a visit in the next 30 days with authorizing provider.  See \"Patient Info\" tab in inbasket, or \"Choose Columns\" in Meds & Orders section of the refill encounter.           "

## 2018-02-06 RX ORDER — METOPROLOL SUCCINATE 25 MG/1
TABLET, EXTENDED RELEASE ORAL
Qty: 90 TABLET | Refills: 0 | Status: SHIPPED | OUTPATIENT
Start: 2018-02-06 | End: 2018-05-01 | Stop reason: SINTOL

## 2018-03-20 DIAGNOSIS — E03.9 ACQUIRED HYPOTHYROIDISM: ICD-10-CM

## 2018-03-20 NOTE — TELEPHONE ENCOUNTER
"Requested Prescriptions   Pending Prescriptions Disp Refills     levothyroxine (SYNTHROID/LEVOTHROID) 88 MCG tablet [Pharmacy Med Name: LEVOTHYROXINE 88MCG TAB]  Last Written Prescription Date:  01/25/2017  Last Fill Quantity: 90,  # refills: 3   Last office visit: 5/9/2017 with prescribing provider:  David   Future Office Visit:     90 tablet      Sig: TAKE 1 TABLET BY MOUTH DAILY BEFORE BREAKFAST ON AN EMPTY STOMACH    Thyroid Protocol Failed    3/20/2018  9:58 AM       Failed - Normal TSH on file in past 12 months    Recent Labs   Lab Test  02/14/17   0847   TSH  3.89             Passed - Patient is 12 years or older       Passed - Recent (12 mo) or future (30 days) visit within the authorizing provider's specialty    Patient had office visit in the last 12 months or has a visit in the next 30 days with authorizing provider or within the authorizing provider's specialty.  See \"Patient Info\" tab in inbasket, or \"Choose Columns\" in Meds & Orders section of the refill encounter.           Passed - No active pregnancy on record    If patient is pregnant or has had a positive pregnancy test, please check TSH.         Passed - No positive pregnancy test in past 12 months    If patient is pregnant or has had a positive pregnancy test, please check TSH.          Edgardo Chance RT (R)    "

## 2018-03-21 RX ORDER — LEVOTHYROXINE SODIUM 88 UG/1
TABLET ORAL
Qty: 30 TABLET | Refills: 0 | Status: SHIPPED | OUTPATIENT
Start: 2018-03-21 | End: 2018-05-01

## 2018-03-21 NOTE — TELEPHONE ENCOUNTER
Medication is being filled for 1 time refill only due to:  Patient needs labs thyroid. Cherry Griffith RNC

## 2018-05-01 ENCOUNTER — OFFICE VISIT (OUTPATIENT)
Dept: FAMILY MEDICINE | Facility: CLINIC | Age: 73
End: 2018-05-01
Payer: COMMERCIAL

## 2018-05-01 VITALS
SYSTOLIC BLOOD PRESSURE: 134 MMHG | OXYGEN SATURATION: 98 % | WEIGHT: 189 LBS | TEMPERATURE: 98.3 F | DIASTOLIC BLOOD PRESSURE: 73 MMHG | BODY MASS INDEX: 31.49 KG/M2 | HEART RATE: 57 BPM | HEIGHT: 65 IN

## 2018-05-01 DIAGNOSIS — Z00.00 ENCOUNTER FOR GENERAL ADULT MEDICAL EXAMINATION WITHOUT ABNORMAL FINDINGS: Primary | ICD-10-CM

## 2018-05-01 DIAGNOSIS — K21.9 GASTROESOPHAGEAL REFLUX DISEASE WITHOUT ESOPHAGITIS: ICD-10-CM

## 2018-05-01 DIAGNOSIS — E03.9 ACQUIRED HYPOTHYROIDISM: ICD-10-CM

## 2018-05-01 DIAGNOSIS — I25.10 CORONARY ARTERY DISEASE INVOLVING NATIVE CORONARY ARTERY OF NATIVE HEART WITHOUT ANGINA PECTORIS: Chronic | ICD-10-CM

## 2018-05-01 DIAGNOSIS — R53.82 CHRONIC FATIGUE: ICD-10-CM

## 2018-05-01 DIAGNOSIS — E78.5 HYPERLIPIDEMIA LDL GOAL <130: Chronic | ICD-10-CM

## 2018-05-01 DIAGNOSIS — F41.1 GAD (GENERALIZED ANXIETY DISORDER): ICD-10-CM

## 2018-05-01 DIAGNOSIS — Z12.11 SCREEN FOR COLON CANCER: ICD-10-CM

## 2018-05-01 DIAGNOSIS — N18.30 CKD (CHRONIC KIDNEY DISEASE) STAGE 3, GFR 30-59 ML/MIN (H): Chronic | ICD-10-CM

## 2018-05-01 DIAGNOSIS — I10 BENIGN ESSENTIAL HYPERTENSION: Chronic | ICD-10-CM

## 2018-05-01 LAB
ALBUMIN SERPL-MCNC: 3.7 G/DL (ref 3.4–5)
ALP SERPL-CCNC: 64 U/L (ref 40–150)
ALT SERPL W P-5'-P-CCNC: 18 U/L (ref 0–50)
ANION GAP SERPL CALCULATED.3IONS-SCNC: 2 MMOL/L (ref 3–14)
AST SERPL W P-5'-P-CCNC: 18 U/L (ref 0–45)
BILIRUB SERPL-MCNC: 0.9 MG/DL (ref 0.2–1.3)
BUN SERPL-MCNC: 24 MG/DL (ref 7–30)
CALCIUM SERPL-MCNC: 9.4 MG/DL (ref 8.5–10.1)
CHLORIDE SERPL-SCNC: 104 MMOL/L (ref 94–109)
CO2 SERPL-SCNC: 28 MMOL/L (ref 20–32)
CREAT SERPL-MCNC: 1.64 MG/DL (ref 0.52–1.04)
CRP SERPL-MCNC: 3.9 MG/L (ref 0–8)
ERYTHROCYTE [DISTWIDTH] IN BLOOD BY AUTOMATED COUNT: 12.4 % (ref 10–15)
ERYTHROCYTE [SEDIMENTATION RATE] IN BLOOD BY WESTERGREN METHOD: 22 MM/H (ref 0–30)
GFR SERPL CREATININE-BSD FRML MDRD: 31 ML/MIN/1.7M2
GLUCOSE SERPL-MCNC: 98 MG/DL (ref 70–99)
HCT VFR BLD AUTO: 40.4 % (ref 35–47)
HGB BLD-MCNC: 13.3 G/DL (ref 11.7–15.7)
MCH RBC QN AUTO: 30.5 PG (ref 26.5–33)
MCHC RBC AUTO-ENTMCNC: 32.9 G/DL (ref 31.5–36.5)
MCV RBC AUTO: 93 FL (ref 78–100)
PLATELET # BLD AUTO: 237 10E9/L (ref 150–450)
POTASSIUM SERPL-SCNC: 5.2 MMOL/L (ref 3.4–5.3)
PROT SERPL-MCNC: 8 G/DL (ref 6.8–8.8)
RBC # BLD AUTO: 4.36 10E12/L (ref 3.8–5.2)
SODIUM SERPL-SCNC: 134 MMOL/L (ref 133–144)
T4 FREE SERPL-MCNC: 0.97 NG/DL (ref 0.76–1.46)
TSH SERPL DL<=0.005 MIU/L-ACNC: 4.38 MU/L (ref 0.4–4)
WBC # BLD AUTO: 10.5 10E9/L (ref 4–11)

## 2018-05-01 PROCEDURE — 85652 RBC SED RATE AUTOMATED: CPT | Performed by: INTERNAL MEDICINE

## 2018-05-01 PROCEDURE — 84443 ASSAY THYROID STIM HORMONE: CPT | Performed by: INTERNAL MEDICINE

## 2018-05-01 PROCEDURE — 80053 COMPREHEN METABOLIC PANEL: CPT | Performed by: INTERNAL MEDICINE

## 2018-05-01 PROCEDURE — 36415 COLL VENOUS BLD VENIPUNCTURE: CPT | Performed by: INTERNAL MEDICINE

## 2018-05-01 PROCEDURE — 86140 C-REACTIVE PROTEIN: CPT | Performed by: INTERNAL MEDICINE

## 2018-05-01 PROCEDURE — 99214 OFFICE O/P EST MOD 30 MIN: CPT | Mod: 25 | Performed by: INTERNAL MEDICINE

## 2018-05-01 PROCEDURE — 85027 COMPLETE CBC AUTOMATED: CPT | Performed by: INTERNAL MEDICINE

## 2018-05-01 PROCEDURE — G0439 PPPS, SUBSEQ VISIT: HCPCS | Performed by: INTERNAL MEDICINE

## 2018-05-01 PROCEDURE — 84439 ASSAY OF FREE THYROXINE: CPT | Performed by: INTERNAL MEDICINE

## 2018-05-01 RX ORDER — LISINOPRIL AND HYDROCHLOROTHIAZIDE 12.5; 2 MG/1; MG/1
2 TABLET ORAL DAILY
Qty: 180 TABLET | Refills: 3 | Status: SHIPPED | OUTPATIENT
Start: 2018-05-01 | End: 2019-02-07

## 2018-05-01 RX ORDER — METOPROLOL SUCCINATE 25 MG/1
25 TABLET, EXTENDED RELEASE ORAL DAILY
Qty: 90 TABLET | Refills: 0 | Status: CANCELLED | OUTPATIENT
Start: 2018-05-01

## 2018-05-01 RX ORDER — ALPRAZOLAM 0.5 MG
0.5 TABLET ORAL DAILY PRN
Qty: 10 TABLET | Refills: 2 | Status: SHIPPED | OUTPATIENT
Start: 2018-05-01 | End: 2018-08-23

## 2018-05-01 RX ORDER — LEVOTHYROXINE SODIUM 88 UG/1
88 TABLET ORAL DAILY
Qty: 90 TABLET | Refills: 3 | Status: SHIPPED | OUTPATIENT
Start: 2018-05-01 | End: 2019-02-07

## 2018-05-01 RX ORDER — ESCITALOPRAM OXALATE 10 MG/1
10 TABLET ORAL DAILY
Qty: 90 TABLET | Refills: 3 | Status: SHIPPED | OUTPATIENT
Start: 2018-05-01 | End: 2018-08-23

## 2018-05-01 RX ORDER — CLONAZEPAM 0.5 MG/1
0.5 TABLET ORAL DAILY PRN
Qty: 30 TABLET | Refills: 5 | Status: CANCELLED | OUTPATIENT
Start: 2018-05-01

## 2018-05-01 ASSESSMENT — ANXIETY QUESTIONNAIRES
GAD7 TOTAL SCORE: 3
2. NOT BEING ABLE TO STOP OR CONTROL WORRYING: SEVERAL DAYS
6. BECOMING EASILY ANNOYED OR IRRITABLE: NOT AT ALL
5. BEING SO RESTLESS THAT IT IS HARD TO SIT STILL: NOT AT ALL
3. WORRYING TOO MUCH ABOUT DIFFERENT THINGS: SEVERAL DAYS
1. FEELING NERVOUS, ANXIOUS, OR ON EDGE: SEVERAL DAYS
7. FEELING AFRAID AS IF SOMETHING AWFUL MIGHT HAPPEN: NOT AT ALL
IF YOU CHECKED OFF ANY PROBLEMS ON THIS QUESTIONNAIRE, HOW DIFFICULT HAVE THESE PROBLEMS MADE IT FOR YOU TO DO YOUR WORK, TAKE CARE OF THINGS AT HOME, OR GET ALONG WITH OTHER PEOPLE: NOT DIFFICULT AT ALL

## 2018-05-01 ASSESSMENT — PATIENT HEALTH QUESTIONNAIRE - PHQ9: 5. POOR APPETITE OR OVEREATING: NOT AT ALL

## 2018-05-01 NOTE — LETTER
My Depression Action Plan  Name: Meryl Fournier   Date of Birth 1945  Date: 5/1/2018    My doctor: Nora Hernandez   My clinic: [facility]  [address] [address]  [phone]          GREEN    ZONE   Good Control    What it looks like:     Things are going generally well. You have normal up s and down s. You may even feel depressed from time to time, but bad moods usually last less than a day.   What you need to do:  1. Continue to care for yourself (see self care plan)  2. Check your depression survival kit and update it as needed  3. Follow your physician s recommendations including any medication.  4. Do not stop taking medication unless you consult with your physician first.           YELLOW         ZONE Getting Worse    What it looks like:     Depression is starting to interfere with your life.     It may be hard to get out of bed; you may be starting to isolate yourself from others.    Symptoms of depression are starting to last most all day and this has happened for several days.     You may have suicidal thoughts but they are not constant.   What you need to do:     1. Call your care team, your response to treatment will improve if you keep your care team informed of your progress. Yellow periods are signs an adjustment may need to be made.     2. Continue your self-care, even if you have to fake it!    3. Talk to someone in your support network    4. Open up your depression survival kit           RED    ZONE Medical Alert - Get Help    What it looks like:     Depression is seriously interfering with your life.     You may experience these or other symptoms: You can t get out of bed most days, can t work or engage in other necessary activities, you have trouble taking care of basic hygiene, or basic responsibilities, thoughts of suicide or death that will not go away, self-injurious behavior.     What you need to do:  1. Call your care team and  request a same-day appointment. If they are not available (weekends or after hours) call your local crisis line, emergency room or 911.            Depression Self Care Plan / Survival Kit    Self-Care for Depression  Here s the deal. Your body and mind are really not as separate as most people think.  What you do and think affects how you feel and how you feel influences what you do and think. This means if you do things that people who feel good do, it will help you feel better.  Sometimes this is all it takes.  There is also a place for medication and therapy depending on how severe your depression is, so be sure to consult with your medical provider and/ or Behavioral Health Consultant if your symptoms are worsening or not improving.     In order to better manage my stress, I will:    Exercise  Get some form of exercise, every day. This will help reduce pain and release endorphins, the  feel good  chemicals in your brain. This is almost as good as taking antidepressants!  This is not the same as joining a gym and then never going! (they count on that by the way ) It can be as simple as just going for a walk or doing some gardening, anything that will get you moving.      Hygiene   Maintain good hygiene (Get out of bed in the morning, Make your bed, Brush your teeth, Take a shower, and Get dressed like you were going to work, even if you are unemployed).  If your clothes don't fit try to get ones that do.    Diet  I will strive to eat foods that are good for me, drink plenty of water, and avoid excessive sugar, caffeine, alcohol, and other mood-altering substances.  Some foods that are helpful in depression are: complex carbohydrates, B vitamins, flaxseed, fish or fish oil, fresh fruits and vegetables.    Psychotherapy  I agree to participate in Individual Therapy (if recommended).    Medication  If prescribed medications, I agree to take them.  Missing doses can result in serious side effects.  I understand that  drinking alcohol, or other illicit drug use, may cause potential side effects.  I will not stop my medication abruptly without first discussing it with my provider.    Staying Connected With Others  I will stay in touch with my friends, family members, and my primary care provider/team.    Use your imagination  Be creative.  We all have a creative side; it doesn t matter if it s oil painting, sand castles, or mud pies! This will also kick up the endorphins.    Witness Beauty  (AKA stop and smell the roses) Take a look outside, even in mid-winter. Notice colors, textures. Watch the squirrels and birds.     Service to others  Be of service to others.  There is always someone else in need.  By helping others we can  get out of ourselves  and remember the really important things.  This also provides opportunities for practicing all the other parts of the program.    Humor  Laugh and be silly!  Adjust your TV habits for less news and crime-drama and more comedy.    Control your stress  Try breathing deep, massage therapy, biofeedback, and meditation. Find time to relax each day.     My support system    Clinic Contact:  Phone number:    Contact 1:  Phone number:    Contact 2:  Phone number:    [place]/:  Phone number:    Therapist:  Phone number:    Local crisis center:    Phone number:    Other community support:  Phone number:

## 2018-05-01 NOTE — PATIENT INSTRUCTIONS
Preventive Health Recommendations    Female Ages 65 +    Yearly exam:     See your health care provider every year in order to  o Review health changes.   o Discuss preventive care.    o Review your medicines if your doctor has prescribed any.      You no longer need a yearly Pap test unless you've had an abnormal Pap test in the past 10 years. If you have vaginal symptoms, such as bleeding or discharge, be sure to talk with your provider about a Pap test.      Every 1 to 2 years, have a mammogram.  If you are over 69, talk with your health care provider about whether or not you want to continue having screening mammograms.      Every 10 years, have a colonoscopy. Or, have a yearly FIT test (stool test). These exams will check for colon cancer.       Have a cholesterol test every 5 years, or more often if your doctor advises it.       Have a diabetes test (fasting glucose) every three years. If you are at risk for diabetes, you should have this test more often.       At age 65, have a bone density scan (DEXA) to check for osteoporosis (brittle bone disease).    Shots:    Get a flu shot each year.    Get a tetanus shot every 10 years.    Talk to your doctor about your pneumonia vaccines. There are now two you should receive - Pneumovax (PPSV 23) and Prevnar (PCV 13).    Talk to your doctor about the shingles vaccine.    Talk to your doctor about the hepatitis B vaccine.    Nutrition:     Eat at least 5 servings of fruits and vegetables each day.      Eat whole-grain bread, whole-wheat pasta and brown rice instead of white grains and rice.      Talk to your provider about Calcium and Vitamin D.     Lifestyle    Exercise at least 150 minutes a week (30 minutes a day, 5 days a week). This will help you control your weight and prevent disease.      Limit alcohol to one drink per day.      No smoking.       Wear sunscreen to prevent skin cancer.       See your dentist twice a year for an exam and cleaning.      See your  eye doctor every 1 to 2 years to screen for conditions such as glaucoma, macular degeneration and cataracts.      1. For fatigue - stop metoprolol to see if cause.  If fatigue doesn't improve, return to clinic - more testing  2. For blood pressure -increase lisinopril/hctz to TWO pills once daily  3. For anxiety, stop clonazepam.  Start lexapro 10 mg once daily. Use xanax as needed.  Don't use with alcohol  4. Blood work today  5. Return FIT test

## 2018-05-01 NOTE — LETTER
May 1, 2018      Meryl Fournier  [address] [place] [address]        Dear ,    We are writing to inform you of your test results.  TSH slightly outside range, but actual measured T4 is normal.  Inflammatory markers negative (CRP, ESR).  Kidney function is low.  Overall trend is slightly decreased. If this continues, may consider having you consult with a kidney specialist.  Low kidney function is not causing any symptoms at this point.  Electrolytes and liver function is normal.  Blood count is normal.Continue with plan of care as discussed at the time of visit.    Resulted Orders   TSH WITH FREE T4 REFLEX   Result Value Ref Range    TSH 4.38 (H) 0.40 - 4.00 mU/L   ESR: Erythrocyte sedimentation rate   Result Value Ref Range    Sed Rate 22 0 - 30 mm/h   CRP, inflammation   Result Value Ref Range    CRP Inflammation 3.9 0.0 - 8.0 mg/L   Comprehensive metabolic panel   Result Value Ref Range    Sodium 134 133 - 144 mmol/L    Potassium 5.2 3.4 - 5.3 mmol/L    Chloride 104 94 - 109 mmol/L    Carbon Dioxide 28 20 - 32 mmol/L    Anion Gap 2 (L) 3 - 14 mmol/L    Glucose 98 70 - 99 mg/dL    Urea Nitrogen 24 7 - 30 mg/dL    Creatinine 1.64 (H) 0.52 - 1.04 mg/dL    GFR Estimate 31 (L) >60 mL/min/1.7m2      Comment:      Non  GFR Calc    GFR Estimate If Black 37 (L) >60 mL/min/1.7m2      Comment:       GFR Calc    Calcium 9.4 8.5 - 10.1 mg/dL    Bilirubin Total 0.9 0.2 - 1.3 mg/dL    Albumin 3.7 3.4 - 5.0 g/dL    Protein Total 8.0 6.8 - 8.8 g/dL    Alkaline Phosphatase 64 40 - 150 U/L    ALT 18 0 - 50 U/L    AST 18 0 - 45 U/L   CBC with platelets   Result Value Ref Range    WBC 10.5 4.0 - 11.0 10e9/L    RBC Count 4.36 3.8 - 5.2 10e12/L    Hemoglobin 13.3 11.7 - 15.7 g/dL    Hematocrit 40.4 35.0 - 47.0 %    MCV 93 78 - 100 fl    MCH 30.5 26.5 - 33.0 pg    MCHC 32.9 31.5 - 36.5 g/dL    RDW 12.4 10.0 - 15.0 %    Platelet Count 237 150 - 450 10e9/L   T4 free   Result Value  Ref Range    T4 Free 0.97 0.76 - 1.46 ng/dL       If you have any questions or concerns, please call the clinic at the number listed above.       Sincerely,        Nora Hernandez, DO

## 2018-05-01 NOTE — MR AVS SNAPSHOT
After Visit Summary   5/1/2018    Meryl Fournier    MRN: [id number]           Patient Information     Date Of Birth          1945        Visit Information        Provider Department      5/1/2018 9:20 AM Nora Hernandez,  [facility]        Today's Diagnoses     Screen for colon cancer    -  1    Encounter for general adult medical examination without abnormal findings        BRISSA (generalized anxiety disorder)        Coronary artery disease involving native coronary artery of native heart without angina pectoris        CKD (chronic kidney disease) stage 3, GFR 30-59 ml/min        Benign essential hypertension        Hyperlipidemia LDL goal <130        Acquired hypothyroidism        Gastroesophageal reflux disease without esophagitis        Chronic fatigue          Care Instructions      Preventive Health Recommendations    Female Ages 65 +    Yearly exam:     See your health care provider every year in order to  o Review health changes.   o Discuss preventive care.    o Review your medicines if your doctor has prescribed any.      You no longer need a yearly Pap test unless you've had an abnormal Pap test in the past 10 years. If you have vaginal symptoms, such as bleeding or discharge, be sure to talk with your provider about a Pap test.      Every 1 to 2 years, have a mammogram.  If you are over 69, talk with your health care provider about whether or not you want to continue having screening mammograms.      Every 10 years, have a colonoscopy. Or, have a yearly FIT test (stool test). These exams will check for colon cancer.       Have a cholesterol test every 5 years, or more often if your doctor advises it.       Have a diabetes test (fasting glucose) every three years. If you are at risk for diabetes, you should have this test more often.       At age 65, have a bone density scan (DEXA) to check for osteoporosis (brittle bone disease).    Shots:    Get a flu shot each  year.    Get a tetanus shot every 10 years.    Talk to your doctor about your pneumonia vaccines. There are now two you should receive - Pneumovax (PPSV 23) and Prevnar (PCV 13).    Talk to your doctor about the shingles vaccine.    Talk to your doctor about the hepatitis B vaccine.    Nutrition:     Eat at least 5 servings of fruits and vegetables each day.      Eat whole-grain bread, whole-wheat pasta and brown rice instead of white grains and rice.      Talk to your provider about Calcium and Vitamin D.     Lifestyle    Exercise at least 150 minutes a week (30 minutes a day, 5 days a week). This will help you control your weight and prevent disease.      Limit alcohol to one drink per day.      No smoking.       Wear sunscreen to prevent skin cancer.       See your dentist twice a year for an exam and cleaning.      See your eye doctor every 1 to 2 years to screen for conditions such as glaucoma, macular degeneration and cataracts.      1. For fatigue - stop metoprolol to see if cause.  If fatigue doesn't improve, return to clinic - more testing  2. For blood pressure -increase lisinopril/hctz to TWO pills once daily  3. For anxiety, stop clonazepam.  Start lexapro 10 mg once daily. Use xanax as needed.  Don't use with alcohol  4. Blood work today  5. Return FIT test          Follow-ups after your visit        Future tests that were ordered for you today     Open Future Orders        Priority Expected Expires Ordered    Fecal colorectal cancer screen (FIT) Routine 5/22/2018 7/24/2018 5/1/2018            Who to contact     If you have questions or need follow up information about today's clinic visit or your schedule please contact [facility] directly at [phone].  Normal or non-critical lab and imaging results will be communicated to you by MyChart, letter or phone within 4 business days after the clinic has received the results. If you do not hear from us within 7 days, please contact the  "clinic through [employer] or phone. If you have a critical or abnormal lab result, we will notify you by phone as soon as possible.  Submit refill requests through [employer] or call your pharmacy and they will forward the refill request to us. Please allow 3 business days for your refill to be completed.          Additional Information About Your Visit        iCurrentharIngagePatient Information     [employer] lets you send messages to your doctor, view your test results, renew your prescriptions, schedule appointments and more. To sign up, go to [email or web address] . Click on \"Log in\" on the left side of the screen, which will take you to the Welcome page. Then click on \"Sign up Now\" on the right side of the page.     You will be asked to enter the access code listed below, as well as some personal information. Please follow the directions to create your username and password.     Your access code is: [email or web address]  Expires: 2018 10:09 AM     Your access code will  in 90 days. If you need help or a new code, please call your [place] clinic or [phone].        Care EveryWhere ID     This is your Care EveryWhere ID. This could be used by other organizations to access your [place] medical records  [id number]        Your Vitals Were     Pulse Temperature Height Pulse Oximetry Breastfeeding? BMI (Body Mass Index)    57 98.3  F (36.8  C) (Tympanic) 5' 4.96\" (1.65 m) 98% No 31.49 kg/m2       Blood Pressure from Last 3 Encounters:   18 134/73   17 127/66   17 152/86    Weight from Last 3 Encounters:   18 189 lb (85.7 kg)   17 190 lb 2 oz (86.2 kg)   17 186 lb (84.4 kg)              We Performed the Following     CBC with platelets     Comprehensive metabolic panel     CRP, inflammation     DEPRESSION ACTION PLAN (DAP)     ESR: Erythrocyte sedimentation rate     TSH WITH FREE T4 REFLEX          Today's Medication Changes          These changes are accurate as of 18 10:10 AM.  If you have " any questions, ask your nurse or doctor.               Start taking these medicines.        Dose/Directions    ALPRAZolam 0.5 MG tablet   Commonly known as:  XANAX   Used for:  BRISSA (generalized anxiety disorder)   Started by:  Nora Hernandez DO        Dose:  0.5 mg   Take 1 tablet (0.5 mg) by mouth daily as needed for anxiety   Quantity:  10 tablet   Refills:  2       escitalopram 10 MG tablet   Commonly known as:  LEXAPRO   Used for:  BRISSA (generalized anxiety disorder)   Started by:  Nora Hernandez DO        Dose:  10 mg   Take 1 tablet (10 mg) by mouth daily   Quantity:  90 tablet   Refills:  3         These medicines have changed or have updated prescriptions.        Dose/Directions    levothyroxine 88 MCG tablet   Commonly known as:  SYNTHROID/LEVOTHROID   This may have changed:  See the new instructions.   Used for:  Acquired hypothyroidism   Changed by:  Nora Hernandez DO        Dose:  88 mcg   Take 1 tablet (88 mcg) by mouth daily   Quantity:  90 tablet   Refills:  3       lisinopril-hydrochlorothiazide 20-12.5 MG per tablet   Commonly known as:  PRINZIDE/ZESTORETIC   This may have changed:  See the new instructions.   Used for:  Benign essential hypertension   Changed by:  Nora Hernandez DO        Dose:  2 tablet   Take 2 tablets by mouth daily   Quantity:  180 tablet   Refills:  3       ranitidine 150 MG tablet   Commonly known as:  ZANTAC   This may have changed:  See the new instructions.   Used for:  Gastroesophageal reflux disease without esophagitis   Changed by:  Nora Hernandez DO        Dose:  150 mg   Take 1 tablet (150 mg) by mouth 2 times daily   Quantity:  180 tablet   Refills:  3         Stop taking these medicines if you haven't already. Please contact your care team if you have questions.     clonazePAM 0.5 MG tablet   Commonly known as:  klonoPIN   Stopped by:  Nora Hernandez DO           metoprolol succinate 25 MG 24 hr tablet   Commonly known as:   TOPROL-XL   Stopped by:  Nora Hernandez DO                Where to get your medicines      These medications were sent to [id number] - [place], [place] - [address]  [address], [facility] [address]     Phone:  [phone]     escitalopram 10 MG tablet    levothyroxine 88 MCG tablet    lisinopril-hydrochlorothiazide 20-12.5 MG per tablet    ranitidine 150 MG tablet         Some of these will need a paper prescription and others can be bought over the counter.  Ask your nurse if you have questions.     Bring a paper prescription for each of these medications     ALPRAZolam 0.5 MG tablet                Primary Care Provider Office Phone # Fax #    Nora Hernandez [phone] [address] [address]        Equal Access to Services     QAMAR RODRIGUEZ : Hadii aad ku hadasho Sobhanu, waaxda luqadaha, qaybta kaalmada adelissethyaemelia, lesvia harden . So [facility] [phone].    ATENCIÓN: Si habla español, tiene a patrick disposición servicios gratuitos de asistencia lingüística. Raj al [phone].    We comply with applicable federal civil rights laws and [place] laws. We do not discriminate on the basis of race, color, national origin, age, disability, sex, sexual orientation, or gender identity.            Thank you!     Thank you for choosing [facility]  for your care. Our goal is always to provide you with excellent care. Hearing back from our patients is one way we can continue to improve our services. Please take a few minutes to complete the written survey that you may receive in the mail after your visit with us. Thank you!             Your Updated Medication List - Protect others around you: Learn how to safely use, store and throw away your medicines at www.disposemymeds.org.          This list is accurate as of 5/1/18 10:10 AM.  Always use your most recent med list.                   Brand  Name Dispense Instructions for use Diagnosis    ALPRAZolam 0.5 MG tablet    XANAX    10 tablet    Take 1 tablet (0.5 mg) by mouth daily as needed for anxiety    BRISSA (generalized anxiety disorder)       aspirin 81 MG tablet      Take  by mouth daily.    CAD (coronary artery disease)       cholecalciferol 5000 units Caps     30 capsule    Take 1 tablet by mouth daily.        COQ10 PO      Take 100 mg by mouth        escitalopram 10 MG tablet    LEXAPRO    90 tablet    Take 1 tablet (10 mg) by mouth daily    BRISSA (generalized anxiety disorder)       flaxseed oil 1000 MG Caps           Krill Oil 300 MG Caps      Take 1 tablet by mouth daily.        levothyroxine 88 MCG tablet    SYNTHROID/LEVOTHROID    90 tablet    Take 1 tablet (88 mcg) by mouth daily    Acquired hypothyroidism       lisinopril-hydrochlorothiazide 20-12.5 MG per tablet    PRINZIDE/ZESTORETIC    180 tablet    Take 2 tablets by mouth daily    Benign essential hypertension       ranitidine 150 MG tablet    ZANTAC    180 tablet    Take 1 tablet (150 mg) by mouth 2 times daily    Gastroesophageal reflux disease without esophagitis       STATIN NOT PRESCRIBED (INTENTIONAL)     0 each    Statin not prescribed intentionally due to Intolerance (with supporting documentation of trying a statin at least once within the last 5 years)    Coronary artery disease involving coronary bypass graft of native heart without angina pectoris

## 2018-05-01 NOTE — PROGRESS NOTES
SUBJECTIVE:   Meryl Fournier is a 72 year old female who presents for Preventive Visit.    Are you in the first 12 months of your Medicare Part B coverage?  No    Healthy Habits:     Do you get at least three servings of calcium containing foods daily (dairy, green leafy vegetables, etc.)? yes    Amount of exercise or daily activities, outside of work: none    Problems taking medications regularly Yes BP medication - fatigue    Medication side effects: Yes above    Have you had an eye exam in the past two years? yes    Do you see a dentist twice per year? No (go every year)    Do you have sleep apnea, excessive snoring or daytime drowsiness?yes      Ability to successfully perform activities of daily living: Yes, no assistance needed    Home safety:  throw rugs in the hallway     Hearing impairment: No    Fall risk:  Fallen 2 or more times in the past year?: No  Any fall with injury in the past year?: No        COGNITIVE SCREEN  1) Repeat 3 items (Banana, Sunrise, Chair)  All 3 words repeted  2) Clock draw: NORMAL  3) 3 item recall: Recalls 3 objects  Results: 3 items recalled: COGNITIVE IMPAIRMENT LESS LIKELY    Mini-CogTM Copyright S [employer]. Licensed by the author for use in [facility]; reprinted with permission (dieudonne@[email or web address]). All rights reserved.        RX: BP, Anxiety - Wants to do some changes.  Taking 1/2 tablet of clonazepam which is working. Takes around 10 am.  When she doesn't take she gets tightness in her chest, with feeling of anxiety. She would like to go off this if possible.  Then states she wants to retry xanax, at twice daily dosing.  She was on xanax 1-2 x daily for years and years and found it worked well.  She reports when she was taking it, she could go longer w/out taking it.  She also reports it doesn't make her drowsy.  buspar made her feel loopy.  She has taken zoloft which she felt like worked, but it made her feel emotionally flat.  She doesn't have this with xanax or  clonazepam.    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    Fatigue:feels tired all the time.  She wonders if the metoprolol is the cause.  She is having some troubles staying asleep.  She doesn't feel rested in AM.  She has daytime fatigue, feeling like she can nap frequently.  Fatigue has worsened since Jan.  She occasionally has blue color change to fingers with associated tingling.  Improve with running over warm water.  -on chart review, she has complained of fatigue since 2016.    TSH: review.  She reports her throat feels 'full' like she is wearing a tight collar.    Hypertension: BP readings all over the place.  117-154      Reviewed and updated as needed this visit by clinical staff  Tobacco  Allergies  Meds  Problems  Med Hx  Surg Hx  Fam Hx  Soc Hx          Reviewed and updated as needed this visit by Provider  Allergies  Meds  Problems        Social History   Substance Use Topics     Smoking status: Former Smoker     Packs/day: 0.50     Types: Cigarettes     Quit date: 7/8/2014     Smokeless tobacco: Never Used      Comment: using E-cigs     Alcohol use No     If you drink alcohol do you typically have >3 drinks per day or >7 drinks per week? No                        Today's PHQ-2 Score: 1  PHQ-2 ( 1999 [employer]) 5/1/2018 2/16/2016   Q1: Little interest or pleasure in doing things 0 1   Q2: Feeling down, depressed or hopeless 1 1   PHQ-2 Score 1 2       Do you feel safe in your environment - Yes    Do you have a Health Care Directive?: Yes: Advance Directive has been received and scanned.    Current providers sharing in care for this patient include:   Patient Care Team:  Nora Hernandez DO as PCP - General (Internal Medicine)    The following health maintenance items are reviewed in Epic and correct as of today:  Health Maintenance   Topic Date Due     DEPRESSION ACTION PLAN Q1 YR  09/29/2016     PHQ-9 Q6 MONTHS  07/25/2017     TSH Q1 YEAR  02/14/2018     FIT Q1 YR  04/18/2018      "FALL RISK ASSESSMENT  05/09/2018     INFLUENZA VACCINE (Season Ended) 09/01/2018     MAMMO SCREEN Q2 YR (SYSTEM ASSIGNED)  01/25/2019     ADVANCE DIRECTIVE PLANNING Q5 YRS  02/05/2019     LIPID SCREEN Q5 YR FEMALE (SYSTEM ASSIGNED)  02/14/2022     TETANUS IMMUNIZATION (SYSTEM ASSIGNED)  01/25/2027     DEXA SCAN SCREENING (SYSTEM ASSIGNED)  Completed     PNEUMOCOCCAL  Completed       ROS:  Constitutional, HEENT, cardiovascular, pulmonary, GI, , musculoskeletal, neuro, skin, endocrine and psych systems are negative, except as otherwise noted.    OBJECTIVE:   /73 (BP Location: Right arm, Patient Position: Chair, Cuff Size: Adult Regular)  Pulse 57  Temp 98.3  F (36.8  C) (Tympanic)  Ht 5' 4.96\" (1.65 m)  Wt 189 lb (85.7 kg)  SpO2 98%  Breastfeeding? No  BMI 31.49 kg/m2 Estimated body mass index is 31.49 kg/(m^2) as calculated from the following:    Height as of this encounter: 5' 4.96\" (1.65 m).    Weight as of this encounter: 189 lb (85.7 kg).  EXAM:   GENERAL APPEARANCE: healthy, alert and no distress  EYES: Eyes grossly normal to inspection, PERRL and conjunctivae and sclerae normal  HENT: ear canals and TM's normal, nose and mouth without ulcers or lesions, oropharynx clear and oral mucous membranes moist  NECK: no adenopathy, no asymmetry, masses, or scars and thyroid normal to palpation  RESP: lungs clear to auscultation - no rales, rhonchi or wheezes  BREAST: normal without masses, tenderness or nipple discharge and no palpable axillary masses or adenopathy  CV: regular rate and rhythm, normal S1 S2, no S3 or S4, no murmur, click or rub, no peripheral edema and peripheral pulses strong  ABDOMEN: soft, nontender, no hepatosplenomegaly, no masses and bowel sounds normal  MS: no musculoskeletal defects are noted and gait is age appropriate without ataxia  SKIN: no suspicious lesions or rashes  NEURO: Normal strength and tone, sensory exam grossly normal, mentation intact and speech normal  PSYCH: " mentation appears normal, affect normal/bright, anxious and worried    ASSESSMENT / PLAN:   1. Encounter for general adult medical examination without abnormal findings    2. BRISSA (generalized anxiety disorder) - stable, but long discussion about how benzos are not first line treatment for daily anxiety symptoms due to increased risk of misuse/abuse.  Patient willing to try SSRI and xanax PRN.  Return to clinic if symptoms worsen  - escitalopram (LEXAPRO) 10 MG tablet; Take 1 tablet (10 mg) by mouth daily  Dispense: 90 tablet; Refill: 3  - ALPRAZolam (XANAX) 0.5 MG tablet; Take 1 tablet (0.5 mg) by mouth daily as needed for anxiety  Dispense: 10 tablet; Refill: 2  - DEPRESSION ACTION PLAN (DAP)    3. Benign essential hypertension - stop metop in case this is causing her fatigue.  Increase the lisinopril to 2 pills once a day if her fatigue does not improve off the.  Metoprolol, further workup should be done  - lisinopril-hydrochlorothiazide (PRINZIDE/ZESTORETIC) 20-12.5 MG per tablet; Take 2 tablets by mouth daily  Dispense: 180 tablet; Refill: 3  - Comprehensive metabolic panel    4. Chronic fatigue - due to BB vs PMR/TA.  - ESR: Erythrocyte sedimentation rate  - CRP, inflammation  - CBC with platelets    5. Screen for colon cancer  - Fecal colorectal cancer screen (FIT); Future  - T4 free    6. Coronary artery disease involving native coronary artery of native heart without angina pectoris - stable    7. CKD (chronic kidney disease) stage 3, GFR 30-59 ml/min - stab    8. Hyperlipidemia LDL goal <130 - stable    9. Acquired hypothyroidism  - due for labs  - TSH WITH FREE T4 REFLEX  - levothyroxine (SYNTHROID/LEVOTHROID) 88 MCG tablet; Take 1 tablet (88 mcg) by mouth daily  Dispense: 90 tablet; Refill: 3    10. Gastroesophageal reflux disease without esophagitis - stable  - ranitidine (ZANTAC) 150 MG tablet; Take 1 tablet (150 mg) by mouth 2 times daily  Dispense: 180 tablet; Refill: 3    1. For fatigue - stop  "metoprolol to see if cause.  If fatigue doesn't improve, return to clinic - more testing  2. For blood pressure -increase lisinopril/hctz to TWO pills once daily  3. For anxiety, stop clonazepam.  Start lexapro 10 mg once daily. Use xanax as needed.  Don't use with alcohol  4. Blood work today  5. Return FIT test    End of Life Planning:  Patient currently has an advanced directive: No.  I have verified the patient's ablity to prepare an advanced directive/make health care decisions.  Literature was provided to assist patient in preparing an advanced directive.    COUNSELING:  Reviewed preventive health counseling, as reflected in patient instructions        Estimated body mass index is 31.49 kg/(m^2) as calculated from the following:    Height as of this encounter: 5' 4.96\" (1.65 m).    Weight as of this encounter: 189 lb (85.7 kg).  Weight management plan: Discussed healthy diet and exercise guidelines and patient will follow up in 12 months in clinic to re-evaluate.     reports that she quit smoking about 3 years ago. Her smoking use included Cigarettes. She smoked 0.50 packs per day. She has never used smokeless tobacco.      Appropriate preventive services were discussed with this patient, including applicable screening as appropriate for cardiovascular disease, diabetes, osteopenia/osteoporosis, and glaucoma.  As appropriate for age/gender, discussed screening for colorectal cancer, prostate cancer, breast cancer, and cervical cancer. Checklist reviewing preventive services available has been given to the patient.    Reviewed patients plan of care and provided an AVS. The Complex Care Plan (for patients with higher acuity and needing more deliberate coordination of services) for Meryl meets the Care Plan requirement. This Care Plan has been established and reviewed with the Patient.    Counseling Resources:  ATP IV Guidelines  Pooled Cohorts Equation Calculator  Breast Cancer Risk Calculator  FRAX Risk " Assessment  ICSI Preventive Guidelines  Dietary Guidelines for Americans, 2010  USDA's MyPlate  ASA Prophylaxis  Lung CA Screening    Nora Hernandez DO  [facility]

## 2018-05-01 NOTE — NURSING NOTE
"Chief Complaint   Patient presents with     Medicare Visit     Pt is fasting.        Initial /73 (BP Location: Right arm, Patient Position: Chair, Cuff Size: Adult Regular)  Pulse 57  Temp 98.3  F (36.8  C) (Tympanic)  Ht 5' 4.96\" (1.65 m)  Wt 189 lb (85.7 kg)  SpO2 98%  Breastfeeding? No  BMI 31.49 kg/m2 Estimated body mass index is 31.49 kg/(m^2) as calculated from the following:    Height as of this encounter: 5' 4.96\" (1.65 m).    Weight as of this encounter: 189 lb (85.7 kg).  Medication Reconciliation: complete   Marnie Aceves CMA (AAMA)   (aka: Sara Aceves)      "

## 2018-05-02 DIAGNOSIS — I10 BENIGN ESSENTIAL HYPERTENSION: Chronic | ICD-10-CM

## 2018-05-02 ASSESSMENT — PATIENT HEALTH QUESTIONNAIRE - PHQ9: SUM OF ALL RESPONSES TO PHQ QUESTIONS 1-9: 8

## 2018-05-02 ASSESSMENT — ANXIETY QUESTIONNAIRES: GAD7 TOTAL SCORE: 3

## 2018-05-02 NOTE — TELEPHONE ENCOUNTER
"Requested Prescriptions   Pending Prescriptions Disp Refills     metoprolol succinate (TOPROL-XL) 25 MG 24 hr tablet [Pharmacy Med Name: METOPROLOL SUCC 25MG ER TAB]        Last Written Prescription Date:  2-6-18 DISCONTINUED ON MED LIST  Last Fill Quantity: 90,   # refills: 0  Last Office Visit: 5-1-18  Future Office visit:      90 tablet      Sig: TAKE 1 TABLET BY MOUTH DAILY    Beta-Blockers Protocol Passed    5/2/2018  1:04 AM       Passed - Blood pressure under 140/90 in past 12 months    BP Readings from Last 3 Encounters:   05/01/18 134/73   05/09/17 127/66   04/06/17 152/86                Passed - Patient is age 6 or older       Passed - Recent (12 mo) or future (30 days) visit within the authorizing provider's specialty    Patient had office visit in the last 12 months or has a visit in the next 30 days with authorizing provider or within the authorizing provider's specialty.  See \"Patient Info\" tab in inbasket, or \"Choose Columns\" in Meds & Orders section of the refill encounter.              "

## 2018-05-03 RX ORDER — METOPROLOL SUCCINATE 25 MG/1
TABLET, EXTENDED RELEASE ORAL
Qty: 90 TABLET | Refills: 0 | OUTPATIENT
Start: 2018-05-03

## 2018-05-16 ENCOUNTER — TELEPHONE (OUTPATIENT)
Dept: FAMILY MEDICINE | Facility: CLINIC | Age: 73
End: 2018-05-16

## 2018-05-16 DIAGNOSIS — F41.1 GAD (GENERALIZED ANXIETY DISORDER): ICD-10-CM

## 2018-05-16 NOTE — TELEPHONE ENCOUNTER
"Reason for call:  Patient reporting a symptom    Symptom or request: medication reaction    Duration (how long have symptoms been present): since switching medications    Have you been treated for this before? No    Additional comments: pt calling stating she was switched from Clonazepam to Alprazolam and it made her anxiety worse. She states she was \"really sick\". She is wondering if she needs a visit to discuss or if this can be handled over the phone.     Phone Number patient can be reached at:  Home number on file [phone] (home)    Best Time:  any    Can we leave a detailed message on this number:  YES    Call taken on 5/16/2018 at 10:39 AM by Indira Luna    "

## 2018-05-17 NOTE — TELEPHONE ENCOUNTER
Thanks for update.  We have tried other things for anxiety, so if the clonazepam is working well, ok to continue and stay off lexapro

## 2018-05-17 NOTE — TELEPHONE ENCOUNTER
"Spoke with pt this AM and she states that she has been attempting to stop Clonazepam due to side effect of fatigue for some time.  With Lexapro, pt states she experienced slowly increasing anxiety with each passing day.  Also experienced some nausea.  On Sunday states that she experienced \"one long anxiety attack\" all afternoon.  Did not take the medication again after Sunday.  Has not taken the xanax PRN as prescribed since OV of 5/1/18.      States she had some Clonazepam 0.5 mg from previous prescription and has been taking this since Tuesday, once daily.  States that fatigue is improved since stopping Metoprolol and feels anxiety is well controlled on clonazepam.  Feels better about where she is currently with anxiety control.     Stopped Lexpro entirely.   Has not taken any Xanax at this time.    Clonazepam 0.5 mg once daily    Pt is asking if there are any other suggestions that Dr. Hernandez may have a this time or if she should just remain where she is currently and let things even out a bit?  Not in need of refills of clonazepam at this time.    Viktoria RENO RN      "

## 2018-05-29 ENCOUNTER — TELEPHONE (OUTPATIENT)
Dept: FAMILY MEDICINE | Facility: CLINIC | Age: 73
End: 2018-05-29

## 2018-05-29 DIAGNOSIS — F41.1 GAD (GENERALIZED ANXIETY DISORDER): ICD-10-CM

## 2018-05-29 RX ORDER — CLONAZEPAM 0.5 MG/1
0.5 TABLET ORAL DAILY PRN
Qty: 30 TABLET | Refills: 0 | Status: SHIPPED | OUTPATIENT
Start: 2018-05-29 | End: 2018-05-29

## 2018-05-29 NOTE — TELEPHONE ENCOUNTER
Juan Rangel called regarding Klonopin prescription that is received today from Dr Junior    Pt's insurance will not accept the prescription from Dr Junior; data base will not accept/recognize her SANDIE.    Pharmacy asks that the prescription be re-sent by Dr Hernandez when she is next in clinic?    Suzanne Miller RN

## 2018-05-29 NOTE — TELEPHONE ENCOUNTER
Covering for PCP:  Reviewed last telephone note from Dr. Hernandez.  Looks like she was okay with continuing clonazepam.  One refill printed and placed in my outbasket.  Follow-up with Dr. Hernandez for further refills.  Thanks.    Shashi Junior MD

## 2018-05-29 NOTE — TELEPHONE ENCOUNTER
[facility] note regarding ALPRAZolam (XANAX) 0.5 MG tablet:  Patient had bad reaction to ALPRAZolam (XANAX) 0.5 MG tablet, wants to go back to Clonazepam. Please send new rx

## 2018-06-01 RX ORDER — CLONAZEPAM 0.5 MG/1
0.5 TABLET ORAL DAILY PRN
Qty: 30 TABLET | Refills: 5 | Status: SHIPPED | OUTPATIENT
Start: 2018-06-01 | End: 2019-01-02

## 2018-06-27 PROCEDURE — 82274 ASSAY TEST FOR BLOOD FECAL: CPT | Performed by: INTERNAL MEDICINE

## 2018-06-29 DIAGNOSIS — Z12.11 SCREEN FOR COLON CANCER: ICD-10-CM

## 2018-06-29 LAB — HEMOCCULT STL QL IA: NEGATIVE

## 2018-08-23 ENCOUNTER — OFFICE VISIT (OUTPATIENT)
Dept: FAMILY MEDICINE | Facility: CLINIC | Age: 73
End: 2018-08-23
Payer: COMMERCIAL

## 2018-08-23 VITALS
DIASTOLIC BLOOD PRESSURE: 70 MMHG | BODY MASS INDEX: 31.32 KG/M2 | SYSTOLIC BLOOD PRESSURE: 128 MMHG | OXYGEN SATURATION: 98 % | TEMPERATURE: 97.3 F | WEIGHT: 188 LBS | HEART RATE: 59 BPM

## 2018-08-23 DIAGNOSIS — Z87.891 PERSONAL HISTORY OF TOBACCO USE, PRESENTING HAZARDS TO HEALTH: ICD-10-CM

## 2018-08-23 DIAGNOSIS — R53.82 CHRONIC FATIGUE: Primary | ICD-10-CM

## 2018-08-23 PROCEDURE — 99213 OFFICE O/P EST LOW 20 MIN: CPT | Performed by: INTERNAL MEDICINE

## 2018-08-23 NOTE — PROGRESS NOTES
"  SUBJECTIVE:   Meryl Fournier is a 73 year old female who presents to clinic today for the following health issues:    Pt would like to have clean ears today:   Pt wants to know if she should change her dosage on the lisinopril since her BP is low.  She is taking lisinopril 20-12.5 BID. disucssed her blood pressure is exactly where we want to see it.  She also wants to know the fatigue why its so bad.       Hypertension Follow-up      Outpatient blood pressures are not being checked.    Low Salt Diet: no added salt    Anxiety Follow-Up    Status since last visit: No change    Other associated symptoms:None    Complicating factors:   Significant life event: No   Current substance abuse: None  Depression symptoms: Yes-  Off and on   BRISSA-7 SCORE 9/29/2015 1/25/2017 5/1/2018   Total Score - - -   Total Score 13 6 3       BRISSA-7    Amount of exercise or physical activity: None    Problems taking medications regularly: No    Medication side effects: none    Diet: regular (no restrictions)    Takes clonazepam  1 tablet daily which works well.  Shortly after taking the clonazepam the fatigue will get worse.       Per our last visit 5/18 on fatigue   \"Fatigue:feels tired all the time.  She wonders if the metoprolol is the cause.  She is having some troubles staying asleep.  She doesn't feel rested in AM.  She has daytime fatigue, feeling like she can nap frequently.  Fatigue has worsened since Jan.  She occasionally has blue color change to fingers with associated tingling.  Improve with running over warm water.  -on chart review, she has complained of fatigue since 2016.\"  --I checked inflammatory markers and CBC which was normal.  --we stopped metoprolol and it did not help.  --is not very active, not much exercise.  --she feels she is sleeping better.  Still does not feel rested in AM.  --she does snore. She also reports she breathes 'shallow' when she sleeps.      Current Outpatient Prescriptions   Medication Sig " Dispense Refill     aspirin 81 MG tablet Take  by mouth daily.  3     clonazePAM (KLONOPIN) 0.5 MG tablet Take 1 tablet (0.5 mg) by mouth daily as needed for anxiety 30 tablet 5     Flaxseed, Linseed, (FLAXSEED OIL) 1000 MG CAPS        Krill Oil 300 MG CAPS Take 1 tablet by mouth daily.       levothyroxine (SYNTHROID/LEVOTHROID) 88 MCG tablet Take 1 tablet (88 mcg) by mouth daily 90 tablet 3     lisinopril-hydrochlorothiazide (PRINZIDE/ZESTORETIC) 20-12.5 MG per tablet Take 2 tablets by mouth daily 180 tablet 3     ranitidine (ZANTAC) 150 MG tablet Take 1 tablet (150 mg) by mouth 2 times daily 180 tablet 3     ALPRAZolam (XANAX) 0.5 MG tablet Take 1 tablet (0.5 mg) by mouth daily as needed for anxiety (Patient not taking: Reported on 8/23/2018) 10 tablet 2     cholecalciferol 5000 UNITS CAPS Take 1 tablet by mouth daily. 30 capsule      Coenzyme Q10 (COQ10 PO) Take 100 mg by mouth       escitalopram (LEXAPRO) 10 MG tablet Take 1 tablet (10 mg) by mouth daily (Patient not taking: Reported on 8/23/2018) 90 tablet 3     STATIN NOT PRESCRIBED, INTENTIONAL, Statin not prescribed intentionally due to Intolerance (with supporting documentation of trying a statin at least once within the last 5 years) (Patient not taking: Reported on 5/1/2018) 0 each 0       Reviewed and updated as needed this visit by clinical staff  Tobacco  Allergies  Meds  Problems  Med Hx  Surg Hx  Fam Hx  Soc Hx        Reviewed and updated as needed this visit by Provider  Allergies  Meds  Problems         ROS:  Constitutional, HEENT, cardiovascular, pulmonary, gi and gu systems are negative, except as otherwise noted.    OBJECTIVE:     /70 (BP Location: Right arm, Patient Position: Sitting, Cuff Size: Adult Large)  Pulse 59  Temp 97.3  F (36.3  C) (Tympanic)  Wt 188 lb (85.3 kg)  SpO2 98%  Breastfeeding? No  BMI 31.32 kg/m2  Body mass index is 31.32 kg/(m^2).  GENERAL APPEARANCE: healthy, alert and no distress  Ears: clear  canals w/out impacted cerumen, normal TM    ASSESSMENT/PLAN:     1. Chronic fatigue - if sleep eval negative, then unlikely to have organic cause of fatigue.  - SLEEP EVALUATION & MANAGEMENT REFERRAL - ADULT -[facility]  [phone] (Age 2 and up); Future    2. Personal history of tobacco use, presenting hazards to health - discussed lung cancer screening and she declined        Nora Hernandez, [facility]

## 2018-08-23 NOTE — MR AVS SNAPSHOT
After Visit Summary   8/23/2018    Meryl Fournier    MRN: [id number]           Patient Information     Date Of Birth          1945        Visit Information        Provider Department      8/23/2018 11:00 AM Nora Hernandez,  [facility]        Today's Diagnoses     Chronic fatigue    -  1    Personal history of tobacco use, presenting hazards to health          Care Instructions    1. We discussed lung cancer screening - risks and benefits, and you declines which is reasonable.  2. Return to clinic 1  Year, sooner if needed    You should dispose of unwanted medications properly.  Most police deparments have drop boxes, typically Monday-Fri 8-4 pm    [facility]  --[facility] Office [facility]   --[facility] Department [facility]   --[facility] Office, [facility]  --Law Enforcement Center [facility]   --[facility] [employer] [address]    --[place] [address] [facility]  --[facility] [address] [facility]  --Law Enforcement Center [address] [place] [address]  --[place] [employer]  --[facility]  --[place] Police Deparment             Follow-ups after your visit        Additional Services     SLEEP EVALUATION & MANAGEMENT REFERRAL - ADULT -[facility]  [phone] (Age 2 and up)       Please be aware that coverage of these services is subject to the terms and limitations of your health insurance plan.  Call member services at your health plan with any benefit or coverage questions.      Please bring the following to your appointment:    >>   List of current medications   >>   This referral request   >>   Any documents/labs given to you for this referral                      Future  tests that were ordered for you today     Open Future Orders        Priority Expected Expires Ordered    SLEEP EVALUATION & MANAGEMENT REFERRAL - ADULT -[facility]  [phone] (Age 2 and up) Routine  8/23/2019 8/23/2018            Who to contact     If you have questions or need follow up information about today's clinic visit or your schedule please contact [facility] directly at [phone].  Normal or non-critical lab and imaging results will be communicated to you by MyChart, letter or phone within 4 business days after the clinic has received the results. If you do not hear from us within 7 days, please contact the clinic through [employer] or phone. If you have a critical or abnormal lab result, we will notify you by phone as soon as possible.  Submit refill requests through [employer] or call your pharmacy and they will forward the refill request to us. Please allow 3 business days for your refill to be completed.          Additional Information About Your Visit        Care EveryWhere ID     This is your Care EveryWhere ID. This could be used by other organizations to access your [facility] medical records  [id number]        Your Vitals Were     Pulse Temperature Pulse Oximetry Breastfeeding? BMI (Body Mass Index)       59 97.3  F (36.3  C) (Tympanic) 98% No 31.32 kg/m2        Blood Pressure from Last 3 Encounters:   08/23/18 128/70   05/01/18 134/73   05/09/17 127/66    Weight from Last 3 Encounters:   08/23/18 188 lb (85.3 kg)   05/01/18 189 lb (85.7 kg)   05/09/17 190 lb 2 oz (86.2 kg)                 Today's Medication Changes          These changes are accurate as of 8/23/18 11:25 AM.  If you have any questions, ask your nurse or doctor.               Stop taking these medicines if you haven't already. Please contact your care team if you have questions.     ALPRAZolam 0.5 MG tablet   Commonly known as:  XANAX   Stopped by:  Nora Hernandez, DO           escitalopram  10 MG tablet   Commonly known as:  LEXAPRO   Stopped by:  Nora Hernandez DO                    Primary Care Provider Office Phone # Fax #    Nora Hernandez [phone] [address] [facility] [address]        Equal Access to Services     QAMAR RODRIGUEZ : Hadii aad ku hadasho Soomaali, waaxda luqadaha, qaybta kaalmada adeegyada, waxay idiin hayaan adeeg khgillesrenee ladouglas shaikh. So [facility] [phone].    ATENCIÓN: Si habla español, tiene a patrick disposición servicios gratuitos de asistencia lingüística. Llame al [phone].    We comply with applicable federal civil rights laws and [place] laws. We do not discriminate on the basis of race, color, national origin, age, disability, sex, sexual orientation, or gender identity.            Thank you!     Thank you for choosing [facility]  for your care. Our goal is always to provide you with excellent care. Hearing back from our patients is one way we can continue to improve our services. Please take a few minutes to complete the written survey that you may receive in the mail after your visit with us. Thank you!             Your Updated Medication List - Protect others around you: Learn how to safely use, store and throw away your medicines at www.disposemymeds.org.          This list is accurate as of 8/23/18 11:25 AM.  Always use your most recent med list.                   Brand Name Dispense Instructions for use Diagnosis    aspirin 81 MG tablet      Take  by mouth daily.    CAD (coronary artery disease)       cholecalciferol 5000 units Caps     30 capsule    Take 1 tablet by mouth daily.        clonazePAM 0.5 MG tablet    klonoPIN    30 tablet    Take 1 tablet (0.5 mg) by mouth daily as needed for anxiety    BRISSA (generalized anxiety disorder)       COQ10 PO      Take 100 mg by mouth        flaxseed oil 1000 MG Caps           Krill Oil 300 MG Caps      Take 1 tablet by mouth daily.        levothyroxine 88 MCG tablet     SYNTHROID/LEVOTHROID    90 tablet    Take 1 tablet (88 mcg) by mouth daily    Acquired hypothyroidism       lisinopril-hydrochlorothiazide 20-12.5 MG per tablet    PRINZIDE/ZESTORETIC    180 tablet    Take 2 tablets by mouth daily    Benign essential hypertension       ranitidine 150 MG tablet    ZANTAC    180 tablet    Take 1 tablet (150 mg) by mouth 2 times daily    Gastroesophageal reflux disease without esophagitis       STATIN NOT PRESCRIBED (INTENTIONAL)     0 each    Statin not prescribed intentionally due to Intolerance (with supporting documentation of trying a statin at least once within the last 5 years)    Coronary artery disease involving coronary bypass graft of native heart without angina pectoris

## 2018-08-23 NOTE — PATIENT INSTRUCTIONS
1. We discussed lung cancer screening - risks and benefits, and you declines which is reasonable.  2. Return to clinic 1  Year, sooner if needed    You should dispose of unwanted medications properly.  Most police deparments have drop boxes, typically Monday-Fri 8-4 pm    [facility]  --[facility] s Office depository [place]   --[place] [facility]   --[facility] Office, [facility]  --Law Enforcement Center [address] [facility]   --[facility] [address]    --[place] [address] [facility]  --[facility] [address] [facility]  --Law Enforcement Center [address] [address] [place] [address]  --[place] Police [employer]  --[facility]  --[place] Police Deparment

## 2019-01-02 DIAGNOSIS — F41.1 GAD (GENERALIZED ANXIETY DISORDER): ICD-10-CM

## 2019-01-02 RX ORDER — CLONAZEPAM 0.5 MG/1
0.5 TABLET ORAL DAILY PRN
Qty: 30 TABLET | Refills: 0 | Status: SHIPPED | OUTPATIENT
Start: 2019-01-02 | End: 2019-02-07

## 2019-01-02 NOTE — TELEPHONE ENCOUNTER
Requested Prescriptions   Pending Prescriptions Disp Refills     clonazePAM (KLONOPIN) 0.5 MG tablet 30 tablet 5     Sig: Take 1 tablet (0.5 mg) by mouth daily as needed for anxiety    There is no refill protocol information for this order        Last Written Prescription Date:  6/1/18  Last Fill Quantity: 30,  # refills: 5   Last office visit: 8/23/2018 with prescribing provider:  David John Office Visit:

## 2019-01-20 DIAGNOSIS — E03.9 ACQUIRED HYPOTHYROIDISM: ICD-10-CM

## 2019-01-21 RX ORDER — LEVOTHYROXINE SODIUM 88 UG/1
TABLET ORAL
Qty: 90 TABLET | Refills: 3 | OUTPATIENT
Start: 2019-01-21

## 2019-01-21 NOTE — TELEPHONE ENCOUNTER
"Requested Prescriptions   Pending Prescriptions Disp Refills     levothyroxine (SYNTHROID/LEVOTHROID) 88 MCG tablet [Pharmacy Med Name: LEVOTHYROXINE 88MCG TABLET] 90 tablet 3    Last Written Prescription Date:  5/1/18  Last Fill Quantity: 90 tab,  # refills: 3   Last office visit: 8/23/2018 with prescribing provider:  Nora Hernandez     Future Office Visit:   Next 5 appointments (look out 90 days)    Jan 29, 2019 10:40 AM CST  SHORT with Nora Hernandez DO  [facility] [address] [facility] [address]  [phone]          Sig: TAKE 1 TABLET BY MOUTH DAILY    Thyroid Protocol Failed - 1/20/2019  5:00 AM       Failed - Normal TSH on file in past 12 months    Recent Labs   Lab Test 05/01/18  1013   TSH 4.38*             Passed - Patient is 12 years or older       Passed - Recent (12 mo) or future (30 days) visit within the authorizing provider's specialty    Patient had office visit in the last 12 months or has a visit in the next 30 days with authorizing provider or within the authorizing provider's specialty.  See \"Patient Info\" tab in inbasket, or \"Choose Columns\" in Meds & Orders section of the refill encounter.             Passed - Medication is active on med list       Passed - No active pregnancy on record    If patient is pregnant or has had a positive pregnancy test, please check TSH.         Passed - No positive pregnancy test in past 12 months    If patient is pregnant or has had a positive pregnancy test, please check TSH.            "

## 2019-01-21 NOTE — TELEPHONE ENCOUNTER
Duplicate  Medication Detail      Disp Refills Start End JUVENCIO   levothyroxine (SYNTHROID/LEVOTHROID) 88 MCG tablet 90 tablet 3 5/1/2018  No   Sig - Route: Take 1 tablet (88 mcg) by mouth daily - Oral   Sent to pharmacy as: levothyroxine (SYNTHROID/LEVOTHROID) 88 MCG tablet   Class: E-Prescribe   Order: [id number]   E-Prescribing Status: Receipt confirmed by pharmacy (5/1/2018 10:10 AM CDT)     Tatiana CASILLAS RN

## 2019-02-06 NOTE — PROGRESS NOTES
SUBJECTIVE:   Meryl Fournier is a 73 year old female who presents to clinic today for the following health issues:    Possible Statin allergic reaction - please review    Referral for Urologist? X 1-2 months.  urine is been strong odor (maybe yeast) and is bright yellow.  She has intermittent right 'kidney pain' after urinating.  The pain can last for 15 min or so.  However, she can have right flank pain not related to urination.   She doesn't drink enough water she thinks.  Denies dysuria, urinary frequency, vaginal symptoms.  She does have history of kidney stones, last passed a stone 10+ years.      Anxiety Follow-Up    Status since last visit: No change    Other associated symptoms:None    Complicating factors:   Significant life event: No   Current substance abuse: None  Depression symptoms: No  BRISSA-7 SCORE 9/29/2015 1/25/2017 5/1/2018   Total Score - - -   Total Score 13 6 3       BRISSA-7    Hypothyroidism Follow-up      Since last visit, patient describes the following symptoms: Fatigue, weight gain and hair loss    Amount of exercise or physical activity: None    Problems taking medications regularly: No    Medication side effects: BP medication    Diet: regular (no restrictions)    She wonders if she needs a dose adjustment because of weight gain and hair thinning and texture change.    Chronic Fatigue: still struggling with this.  Is intermittent, good and bad days.  We had discussed sleep clinic evaluation for this but she declines.  She doesn't think she has a sleep problem.  Am is worst, and PM is when she feels the best.  She takes the clonazepam mid-day. Khang young notes lifelong palpitations, not changed.  She notes for all her life, she typically feels worst from 2-4 pm.  Overall she notes her fatigue is stable and doesn't want further work-up.   --has not had holter, sleep study, or stress test        Current Outpatient Medications   Medication Sig Dispense Refill     aspirin 81 MG tablet Take  by  mouth daily.  3     cholecalciferol 5000 UNITS CAPS Take 1 tablet by mouth daily. 30 capsule      clonazePAM (KLONOPIN) 0.5 MG tablet Take 1 tablet (0.5 mg) by mouth daily as needed for anxiety Needs office visit before further refills. 30 tablet 0     Coenzyme Q10 (COQ10 PO) Take 100 mg by mouth       Flaxseed, Linseed, (FLAXSEED OIL) 1000 MG CAPS        Krill Oil 300 MG CAPS Take 1 tablet by mouth daily.       levothyroxine (SYNTHROID/LEVOTHROID) 88 MCG tablet Take 1 tablet (88 mcg) by mouth daily 90 tablet 3     lisinopril-hydrochlorothiazide (PRINZIDE/ZESTORETIC) 20-12.5 MG per tablet Take 2 tablets by mouth daily 180 tablet 3     STATIN NOT PRESCRIBED, INTENTIONAL, Statin not prescribed intentionally due to Intolerance (with supporting documentation of trying a statin at least once within the last 5 years) (Patient not taking: Reported on 5/1/2018) 0 each 0       Reviewed and updated as needed this visit by clinical staff       Reviewed and updated as needed this visit by Provider         ROS:  Constitutional, HEENT, cardiovascular, pulmonary, gi and gu systems are negative, except as otherwise noted.    OBJECTIVE:     /78 (BP Location: Right arm, Patient Position: Sitting, Cuff Size: Adult Large)   Pulse 77   Temp 97.3  F (36.3  C) (Tympanic)   Resp 12   Wt 86.2 kg (190 lb)   SpO2 100%   Breastfeeding? No   BMI 31.66 kg/m    Body mass index is 31.66 kg/m .  GENERAL APPEARANCE: healthy, alert and no distress  HENT: MMM  NECK: no adenopathy, no asymmetry, masses, or scars and thyroid normal to palpation  RESP: lungs clear to auscultation - no rales, rhonchi or wheezes  CV: regular rates and rhythm, normal S1 S2, no S3 or S4 and no murmur, click or rub  LYMPHATICS: no cervical adenopathy  ABDOMEN: soft, nontender, without hepatosplenomegaly or masses and bowel sounds normal  SKIN: no suspicious lesions or rashes  NEURO: Normal strength and tone, mentation intact, speech normal and DTR symmetrically  normal in lower extremities      ASSESSMENT/PLAN:       1. CKD (chronic kidney disease) stage 3, GFR 30-59 ml/min (H)-due for labs.  If her kidney function has worsened, will refer her to nephrology.  For the urine symptoms as above, will rule out infection and stones as contributing.  If all evaluation is negative, symptoms may not be pathologic and will monitor  - Renal panel (Alb, BUN, Ca, Cl, CO2, Creat, Gluc, Phos, K, Na)  - CBC with platelets  - Albumin Random Urine Quantitative with Creat Ratio  - UA reflex to Microscopic and Culture  - Wet prep  - US Renal Complete; Future    2. Benign essential hypertension -stable, refill provided  - lisinopril-hydrochlorothiazide (PRINZIDE/ZESTORETIC) 20-12.5 MG tablet; Take 1 tablet by mouth 2 times daily  Dispense: 180 tablet; Refill: 3    3. Acquired hypothyroidism -check labs and fill meds.  Discussed that her weight gain and hair loss may not be related to her thyroid, patient understands.  - levothyroxine (SYNTHROID/LEVOTHROID) 88 MCG tablet; Take 1 tablet (88 mcg) by mouth daily  Dispense: 90 tablet; Refill: 3  - TSH with free T4 reflex    4. BRISSA (generalized anxiety disorder) -we have tried many different medications without benefit.  Discussed how the Klonopin might be contributing to her fatigue.  At this point, she feels the benefits outweigh the risk and would like to continue which is reasonable.  - clonazePAM (KLONOPIN) 0.5 MG tablet; Take 1 tablet (0.5 mg) by mouth daily as needed for anxiety  Dispense: 30 tablet; Refill: 5    5. Calculus of kidney -check ultrasound as above    6. Gastroesophageal reflux disease without esophagitis -stable, refill provided  - ranitidine (ZANTAC) 150 MG tablet; Take 1 tablet (150 mg) by mouth 2 times daily  Dispense: 180 tablet; Refill: 3    7. Visit for screening mammogram  - *MA Screening Digital Bilateral; Future    8. Need for prophylactic vaccination and inoculation against influenza  - FLU VAC, SPLIT VIRUS IM > 3 YO  (QUADRIVALENT) [41188]  - Vaccine Administration, Initial [48886]    Patient Instructions   1. Refills given  2. Blood work and urine test today  3. Radiology test was ordered - mammogram and kidney ultrasound.  Please call [phone] to schedule.  4. Studies we could consider for the fatigue - sleep clinic evaluation, Cardiac monitor, stress test.  5. Kidney symptoms could be due to from stones, bladder or vaginal infection          Nora Hernandez, DO  [facility]  Injectable Influenza Immunization Documentation    1.  Is the person to be vaccinated sick today?   No    2. Does the person to be vaccinated have an allergy to a component   of the vaccine?   No  Egg Allergy Algorithm Link    3. Has the person to be vaccinated ever had a serious reaction   to influenza vaccine in the past?   No    4. Has the person to be vaccinated ever had Guillain-Barré syndrome?   No    Form completed by Marnie

## 2019-02-07 ENCOUNTER — OFFICE VISIT (OUTPATIENT)
Dept: FAMILY MEDICINE | Facility: CLINIC | Age: 74
End: 2019-02-07
Payer: COMMERCIAL

## 2019-02-07 VITALS
RESPIRATION RATE: 12 BRPM | HEART RATE: 77 BPM | TEMPERATURE: 97.3 F | WEIGHT: 190 LBS | SYSTOLIC BLOOD PRESSURE: 138 MMHG | OXYGEN SATURATION: 100 % | BODY MASS INDEX: 31.66 KG/M2 | DIASTOLIC BLOOD PRESSURE: 78 MMHG

## 2019-02-07 DIAGNOSIS — B96.89 BV (BACTERIAL VAGINOSIS): Primary | ICD-10-CM

## 2019-02-07 DIAGNOSIS — N20.0 CALCULUS OF KIDNEY: ICD-10-CM

## 2019-02-07 DIAGNOSIS — N18.4 CKD (CHRONIC KIDNEY DISEASE) STAGE 4, GFR 15-29 ML/MIN (H): ICD-10-CM

## 2019-02-07 DIAGNOSIS — N18.30 CKD (CHRONIC KIDNEY DISEASE) STAGE 3, GFR 30-59 ML/MIN (H): Primary | Chronic | ICD-10-CM

## 2019-02-07 DIAGNOSIS — N76.0 BV (BACTERIAL VAGINOSIS): Primary | ICD-10-CM

## 2019-02-07 DIAGNOSIS — I10 BENIGN ESSENTIAL HYPERTENSION: Chronic | ICD-10-CM

## 2019-02-07 DIAGNOSIS — Z23 NEED FOR PROPHYLACTIC VACCINATION AND INOCULATION AGAINST INFLUENZA: ICD-10-CM

## 2019-02-07 DIAGNOSIS — Z12.31 VISIT FOR SCREENING MAMMOGRAM: ICD-10-CM

## 2019-02-07 DIAGNOSIS — K21.9 GASTROESOPHAGEAL REFLUX DISEASE WITHOUT ESOPHAGITIS: ICD-10-CM

## 2019-02-07 DIAGNOSIS — F41.1 GAD (GENERALIZED ANXIETY DISORDER): ICD-10-CM

## 2019-02-07 DIAGNOSIS — E03.9 ACQUIRED HYPOTHYROIDISM: ICD-10-CM

## 2019-02-07 LAB
ALBUMIN SERPL-MCNC: 3.6 G/DL (ref 3.4–5)
ALBUMIN UR-MCNC: NEGATIVE MG/DL
ANION GAP SERPL CALCULATED.3IONS-SCNC: 6 MMOL/L (ref 3–14)
APPEARANCE UR: CLEAR
BILIRUB UR QL STRIP: NEGATIVE
BUN SERPL-MCNC: 36 MG/DL (ref 7–30)
CALCIUM SERPL-MCNC: 9.2 MG/DL (ref 8.5–10.1)
CHLORIDE SERPL-SCNC: 106 MMOL/L (ref 94–109)
CO2 SERPL-SCNC: 26 MMOL/L (ref 20–32)
COLOR UR AUTO: YELLOW
CREAT SERPL-MCNC: 1.88 MG/DL (ref 0.52–1.04)
ERYTHROCYTE [DISTWIDTH] IN BLOOD BY AUTOMATED COUNT: 12.4 % (ref 10–15)
GFR SERPL CREATININE-BSD FRML MDRD: 26 ML/MIN/{1.73_M2}
GLUCOSE SERPL-MCNC: 95 MG/DL (ref 70–99)
GLUCOSE UR STRIP-MCNC: NEGATIVE MG/DL
HCT VFR BLD AUTO: 39.2 % (ref 35–47)
HGB BLD-MCNC: 13 G/DL (ref 11.7–15.7)
HGB UR QL STRIP: NEGATIVE
KETONES UR STRIP-MCNC: NEGATIVE MG/DL
LEUKOCYTE ESTERASE UR QL STRIP: NEGATIVE
MCH RBC QN AUTO: 30.1 PG (ref 26.5–33)
MCHC RBC AUTO-ENTMCNC: 33.2 G/DL (ref 31.5–36.5)
MCV RBC AUTO: 91 FL (ref 78–100)
NITRATE UR QL: NEGATIVE
PH UR STRIP: 5 PH (ref 5–7)
PHOSPHATE SERPL-MCNC: 3.5 MG/DL (ref 2.5–4.5)
PLATELET # BLD AUTO: 224 10E9/L (ref 150–450)
POTASSIUM SERPL-SCNC: 5.2 MMOL/L (ref 3.4–5.3)
RBC # BLD AUTO: 4.32 10E12/L (ref 3.8–5.2)
SODIUM SERPL-SCNC: 138 MMOL/L (ref 133–144)
SOURCE: NORMAL
SP GR UR STRIP: 1.02 (ref 1–1.03)
SPECIMEN SOURCE: ABNORMAL
TSH SERPL DL<=0.005 MIU/L-ACNC: 3.4 MU/L (ref 0.4–4)
UROBILINOGEN UR STRIP-ACNC: 0.2 EU/DL (ref 0.2–1)
WBC # BLD AUTO: 10.1 10E9/L (ref 4–11)
WET PREP SPEC: ABNORMAL

## 2019-02-07 PROCEDURE — 80069 RENAL FUNCTION PANEL: CPT | Performed by: INTERNAL MEDICINE

## 2019-02-07 PROCEDURE — 87210 SMEAR WET MOUNT SALINE/INK: CPT | Performed by: INTERNAL MEDICINE

## 2019-02-07 PROCEDURE — 90686 IIV4 VACC NO PRSV 0.5 ML IM: CPT | Performed by: INTERNAL MEDICINE

## 2019-02-07 PROCEDURE — 85027 COMPLETE CBC AUTOMATED: CPT | Performed by: INTERNAL MEDICINE

## 2019-02-07 PROCEDURE — G0008 ADMIN INFLUENZA VIRUS VAC: HCPCS | Performed by: INTERNAL MEDICINE

## 2019-02-07 PROCEDURE — 99214 OFFICE O/P EST MOD 30 MIN: CPT | Mod: 25 | Performed by: INTERNAL MEDICINE

## 2019-02-07 PROCEDURE — 81003 URINALYSIS AUTO W/O SCOPE: CPT | Performed by: INTERNAL MEDICINE

## 2019-02-07 PROCEDURE — 36415 COLL VENOUS BLD VENIPUNCTURE: CPT | Performed by: INTERNAL MEDICINE

## 2019-02-07 PROCEDURE — 82043 UR ALBUMIN QUANTITATIVE: CPT | Performed by: INTERNAL MEDICINE

## 2019-02-07 PROCEDURE — 84443 ASSAY THYROID STIM HORMONE: CPT | Performed by: INTERNAL MEDICINE

## 2019-02-07 RX ORDER — LISINOPRIL AND HYDROCHLOROTHIAZIDE 12.5; 2 MG/1; MG/1
1 TABLET ORAL 2 TIMES DAILY
Qty: 180 TABLET | Refills: 3 | Status: SHIPPED | OUTPATIENT
Start: 2019-02-07 | End: 2019-04-22

## 2019-02-07 RX ORDER — LEVOTHYROXINE SODIUM 88 UG/1
88 TABLET ORAL DAILY
Qty: 90 TABLET | Refills: 3 | Status: SHIPPED | OUTPATIENT
Start: 2019-02-07 | End: 2020-01-16

## 2019-02-07 RX ORDER — CLONAZEPAM 0.5 MG/1
0.5 TABLET ORAL DAILY PRN
Qty: 30 TABLET | Refills: 5 | Status: SHIPPED | OUTPATIENT
Start: 2019-02-07 | End: 2019-08-07 | Stop reason: ALTCHOICE

## 2019-02-07 RX ORDER — METRONIDAZOLE 500 MG/1
500 TABLET ORAL 2 TIMES DAILY
Qty: 14 TABLET | Refills: 0 | Status: SHIPPED | OUTPATIENT
Start: 2019-02-07 | End: 2019-04-22

## 2019-02-07 ASSESSMENT — ANXIETY QUESTIONNAIRES
5. BEING SO RESTLESS THAT IT IS HARD TO SIT STILL: SEVERAL DAYS
2. NOT BEING ABLE TO STOP OR CONTROL WORRYING: NEARLY EVERY DAY
3. WORRYING TOO MUCH ABOUT DIFFERENT THINGS: NEARLY EVERY DAY
7. FEELING AFRAID AS IF SOMETHING AWFUL MIGHT HAPPEN: SEVERAL DAYS
1. FEELING NERVOUS, ANXIOUS, OR ON EDGE: NEARLY EVERY DAY
GAD7 TOTAL SCORE: 15
6. BECOMING EASILY ANNOYED OR IRRITABLE: SEVERAL DAYS
IF YOU CHECKED OFF ANY PROBLEMS ON THIS QUESTIONNAIRE, HOW DIFFICULT HAVE THESE PROBLEMS MADE IT FOR YOU TO DO YOUR WORK, TAKE CARE OF THINGS AT HOME, OR GET ALONG WITH OTHER PEOPLE: SOMEWHAT DIFFICULT

## 2019-02-07 ASSESSMENT — PATIENT HEALTH QUESTIONNAIRE - PHQ9
SUM OF ALL RESPONSES TO PHQ QUESTIONS 1-9: 7
5. POOR APPETITE OR OVEREATING: NEARLY EVERY DAY

## 2019-02-07 NOTE — PATIENT INSTRUCTIONS
1. Refills given  2. Blood work and urine test today  3. Radiology test was ordered - mammogram and kidney ultrasound.  Please call [phone] to schedule.  4. Studies we could consider for the fatigue - sleep clinic evaluation, Cardiac monitor, stress test.  5. Kidney symptoms could be due to from stones, bladder or vaginal infection

## 2019-02-08 LAB
CREAT UR-MCNC: 43 MG/DL
MICROALBUMIN UR-MCNC: 7 MG/L
MICROALBUMIN/CREAT UR: 16.85 MG/G CR (ref 0–25)

## 2019-02-08 ASSESSMENT — ANXIETY QUESTIONNAIRES: GAD7 TOTAL SCORE: 15

## 2019-03-20 ENCOUNTER — HOSPITAL ENCOUNTER (OUTPATIENT)
Dept: MAMMOGRAPHY | Facility: CLINIC | Age: 74
Discharge: HOME | End: 2019-03-20
Attending: INTERNAL MEDICINE | Admitting: INTERNAL MEDICINE
Payer: COMMERCIAL

## 2019-03-20 DIAGNOSIS — Z12.31 VISIT FOR SCREENING MAMMOGRAM: ICD-10-CM

## 2019-03-20 PROCEDURE — 77067 SCR MAMMO BI INCL CAD: CPT

## 2019-04-12 ENCOUNTER — PRE VISIT (OUTPATIENT)
Dept: NEPHROLOGY | Facility: CLINIC | Age: 74
End: 2019-04-12

## 2019-04-12 NOTE — TELEPHONE ENCOUNTER
PREVISIT INFORMATION                                                    Meryl Fournier scheduled for future visit at [facility] specialty clinics.    Patient is scheduled to see Dr. Betancur on 4/22/19  Reason for visit: CKD (chronic kidney disease) stage 3  Referring provider Nora Heranndez, DO  Has patient seen previous specialist? No  Medical Records:  Available in chart.  Patient was previously seen at a [facility] or [facility] facility.    REVIEW                                                      New patient packet mailed to patient: Yes  Medication reconciliation complete: Yes      Current Outpatient Medications   Medication Sig Dispense Refill     aspirin 81 MG tablet Take  by mouth daily.  3     cholecalciferol 5000 UNITS CAPS Take 1 tablet by mouth daily. 30 capsule      clonazePAM (KLONOPIN) 0.5 MG tablet Take 1 tablet (0.5 mg) by mouth daily as needed for anxiety 30 tablet 5     Coenzyme Q10 (COQ10 PO) Take 100 mg by mouth       Flaxseed, Linseed, (FLAXSEED OIL) 1000 MG CAPS        Krill Oil 300 MG CAPS Take 1 tablet by mouth daily.       levothyroxine (SYNTHROID/LEVOTHROID) 88 MCG tablet Take 1 tablet (88 mcg) by mouth daily 90 tablet 3     lisinopril-hydrochlorothiazide (PRINZIDE/ZESTORETIC) 20-12.5 MG tablet Take 1 tablet by mouth 2 times daily 180 tablet 3     ranitidine (ZANTAC) 150 MG tablet Take 1 tablet (150 mg) by mouth 2 times daily 180 tablet 3     STATIN NOT PRESCRIBED, INTENTIONAL, Statin not prescribed intentionally due to Intolerance (with supporting documentation of trying a statin at least once within the last 5 years) (Patient not taking: Reported on 5/1/2018) 0 each 0       Allergies: Lexapro [escitalopram]      PLAN/FOLLOW-UP NEEDED                                                      Previsit review complete.  Patient will see provider at future scheduled appointment.     Patient Reminders Given:  Please, make sure you bring an updated  list of your medications.   If you are having a procedure, please, present 15 minutes early.  If you need to cancel or reschedule,please call [phone].    Pt has an Ultrasound scheduled prior.    Ladonna Betancur LPN

## 2019-04-22 ENCOUNTER — ANCILLARY PROCEDURE (OUTPATIENT)
Dept: ULTRASOUND IMAGING | Facility: CLINIC | Age: 74
End: 2019-04-22
Attending: INTERNAL MEDICINE
Payer: COMMERCIAL

## 2019-04-22 ENCOUNTER — OFFICE VISIT (OUTPATIENT)
Dept: NEPHROLOGY | Facility: CLINIC | Age: 74
End: 2019-04-22
Attending: INTERNAL MEDICINE
Payer: COMMERCIAL

## 2019-04-22 VITALS
BODY MASS INDEX: 32.16 KG/M2 | DIASTOLIC BLOOD PRESSURE: 70 MMHG | SYSTOLIC BLOOD PRESSURE: 154 MMHG | HEART RATE: 63 BPM | WEIGHT: 193 LBS | OXYGEN SATURATION: 97 %

## 2019-04-22 DIAGNOSIS — N20.0 CALCULUS OF KIDNEY: ICD-10-CM

## 2019-04-22 DIAGNOSIS — N18.30 CKD (CHRONIC KIDNEY DISEASE) STAGE 3, GFR 30-59 ML/MIN (H): Chronic | ICD-10-CM

## 2019-04-22 DIAGNOSIS — I10 BENIGN ESSENTIAL HYPERTENSION: Primary | Chronic | ICD-10-CM

## 2019-04-22 DIAGNOSIS — R59.9 ENLARGED LYMPH NODES: ICD-10-CM

## 2019-04-22 DIAGNOSIS — E03.9 ACQUIRED HYPOTHYROIDISM: ICD-10-CM

## 2019-04-22 DIAGNOSIS — R93.2 HIGHLY ECHOGENIC LIVER ON ULTRASOUND: ICD-10-CM

## 2019-04-22 DIAGNOSIS — N18.4 CKD (CHRONIC KIDNEY DISEASE) STAGE 4, GFR 15-29 ML/MIN (H): ICD-10-CM

## 2019-04-22 LAB
ALBUMIN SERPL-MCNC: 3.9 G/DL (ref 3.4–5)
ALBUMIN UR-MCNC: NEGATIVE MG/DL
ALP SERPL-CCNC: 70 U/L (ref 40–150)
ALT SERPL W P-5'-P-CCNC: 31 U/L (ref 0–50)
ANION GAP SERPL CALCULATED.3IONS-SCNC: 5 MMOL/L (ref 3–14)
APPEARANCE UR: CLEAR
AST SERPL W P-5'-P-CCNC: 21 U/L (ref 0–45)
BASOPHILS # BLD AUTO: 0.1 10E9/L (ref 0–0.2)
BASOPHILS NFR BLD AUTO: 0.8 %
BILIRUB SERPL-MCNC: 0.5 MG/DL (ref 0.2–1.3)
BILIRUB UR QL STRIP: NEGATIVE
BUN SERPL-MCNC: 22 MG/DL (ref 7–30)
CALCIUM SERPL-MCNC: 9.6 MG/DL (ref 8.5–10.1)
CHLORIDE SERPL-SCNC: 109 MMOL/L (ref 94–109)
CO2 SERPL-SCNC: 25 MMOL/L (ref 20–32)
COLOR UR AUTO: NORMAL
CREAT SERPL-MCNC: 1.56 MG/DL (ref 0.52–1.04)
CREAT UR-MCNC: 74 MG/DL
DIFFERENTIAL METHOD BLD: NORMAL
EOSINOPHIL # BLD AUTO: 0.3 10E9/L (ref 0–0.7)
EOSINOPHIL NFR BLD AUTO: 3.2 %
ERYTHROCYTE [DISTWIDTH] IN BLOOD BY AUTOMATED COUNT: 12.4 % (ref 10–15)
GFR SERPL CREATININE-BSD FRML MDRD: 32 ML/MIN/{1.73_M2}
GLUCOSE SERPL-MCNC: 113 MG/DL (ref 70–99)
GLUCOSE UR STRIP-MCNC: NEGATIVE MG/DL
HCT VFR BLD AUTO: 40.2 % (ref 35–47)
HGB BLD-MCNC: 13.3 G/DL (ref 11.7–15.7)
HGB UR QL STRIP: NEGATIVE
IMM GRANULOCYTES # BLD: 0.1 10E9/L (ref 0–0.4)
IMM GRANULOCYTES NFR BLD: 0.6 %
KETONES UR STRIP-MCNC: NEGATIVE MG/DL
LEUKOCYTE ESTERASE UR QL STRIP: NEGATIVE
LYMPHOCYTES # BLD AUTO: 2.2 10E9/L (ref 0.8–5.3)
LYMPHOCYTES NFR BLD AUTO: 20.6 %
MCH RBC QN AUTO: 30.1 PG (ref 26.5–33)
MCHC RBC AUTO-ENTMCNC: 33.1 G/DL (ref 31.5–36.5)
MCV RBC AUTO: 91 FL (ref 78–100)
MICROALBUMIN UR-MCNC: 11 MG/L
MICROALBUMIN/CREAT UR: 14.96 MG/G CR (ref 0–25)
MONOCYTES # BLD AUTO: 0.6 10E9/L (ref 0–1.3)
MONOCYTES NFR BLD AUTO: 5.6 %
NEUTROPHILS # BLD AUTO: 7.3 10E9/L (ref 1.6–8.3)
NEUTROPHILS NFR BLD AUTO: 69.2 %
NITRATE UR QL: NEGATIVE
PH UR STRIP: 5 PH (ref 5–7)
PLATELET # BLD AUTO: 256 10E9/L (ref 150–450)
POTASSIUM SERPL-SCNC: 4.7 MMOL/L (ref 3.4–5.3)
PROT SERPL-MCNC: 7.9 G/DL (ref 6.8–8.8)
PROT UR-MCNC: 0.1 G/L
PROT/CREAT 24H UR: 0.14 G/G CR (ref 0–0.2)
PTH-INTACT SERPL-MCNC: 90 PG/ML (ref 18–80)
RBC # BLD AUTO: 4.42 10E12/L (ref 3.8–5.2)
SODIUM SERPL-SCNC: 139 MMOL/L (ref 133–144)
SOURCE: NORMAL
SP GR UR STRIP: 1.01 (ref 1–1.03)
UROBILINOGEN UR STRIP-MCNC: NORMAL MG/DL (ref 0–2)
WBC # BLD AUTO: 10.5 10E9/L (ref 4–11)

## 2019-04-22 PROCEDURE — 85025 COMPLETE CBC W/AUTO DIFF WBC: CPT | Performed by: INTERNAL MEDICINE

## 2019-04-22 PROCEDURE — 84165 PROTEIN E-PHORESIS SERUM: CPT | Performed by: INTERNAL MEDICINE

## 2019-04-22 PROCEDURE — 82043 UR ALBUMIN QUANTITATIVE: CPT | Performed by: INTERNAL MEDICINE

## 2019-04-22 PROCEDURE — 00000402 ZZHCL STATISTIC TOTAL PROTEIN: Performed by: INTERNAL MEDICINE

## 2019-04-22 PROCEDURE — 36415 COLL VENOUS BLD VENIPUNCTURE: CPT | Performed by: INTERNAL MEDICINE

## 2019-04-22 PROCEDURE — 76770 US EXAM ABDO BACK WALL COMP: CPT | Performed by: RADIOLOGY

## 2019-04-22 PROCEDURE — 83970 ASSAY OF PARATHORMONE: CPT | Performed by: INTERNAL MEDICINE

## 2019-04-22 PROCEDURE — 84156 ASSAY OF PROTEIN URINE: CPT | Performed by: INTERNAL MEDICINE

## 2019-04-22 PROCEDURE — 80053 COMPREHEN METABOLIC PANEL: CPT | Performed by: INTERNAL MEDICINE

## 2019-04-22 PROCEDURE — 81003 URINALYSIS AUTO W/O SCOPE: CPT | Performed by: INTERNAL MEDICINE

## 2019-04-22 PROCEDURE — 99204 OFFICE O/P NEW MOD 45 MIN: CPT | Performed by: INTERNAL MEDICINE

## 2019-04-22 RX ORDER — NIFEDIPINE 30 MG/1
30 TABLET, EXTENDED RELEASE ORAL DAILY
Qty: 90 TABLET | Refills: 3 | Status: SHIPPED | OUTPATIENT
Start: 2019-04-22 | End: 2019-08-07 | Stop reason: SINTOL

## 2019-04-22 ASSESSMENT — PAIN SCALES - GENERAL: PAINLEVEL: MILD PAIN (3)

## 2019-04-22 NOTE — NURSING NOTE
Meryl Fournier's goals for this visit include:   Chief Complaint   Patient presents with     Consult     CKD3  RP: Nora Hernandez,        She requests these members of her care team be copied on today's visit information: no    PCP: Nora Hernandez    Referring Provider:  Nora Hernandez DO  [address] [place] [address]    /70 (BP Location: Left arm, Patient Position: Sitting, Cuff Size: Adult Large)   Pulse 63   Wt 87.5 kg (193 lb)   SpO2 97%   BMI 32.16 kg/m      Do you need any medication refills at today's visit? No    Ladonna Betancur LPN

## 2019-04-22 NOTE — Clinical Note
Hi Dr. Hernandez,There were a few things noted with Ms. Fournier's workup that need some follow-up. I was hoping to get your thoughts:1. Echogenic liver noted on renal ultrasound: I got a CMP today but maybe we should consider dedicated liver imaging? Did have a dilated bile duct on imaging from ~ 2003. 2. On exam today I noted an enlarged lymph node on the left neck - what imaging do you typically start with for this? Appreciate your input as I don't typically end up working this up. Thank you,Ra

## 2019-04-22 NOTE — LETTER
May 8, 2019      Meryl Fournier  [address] [facility] [address]              Dear Meryl,    Attached are your labs from your initial visit in our clinic. I will summarize the findings here:     - There is no evidence of myeloma which is reassuring.   - Your kidney function was 1.56 which is improved from where it was in Feb but still higher than where you were a few years ago.   - hemoglobin and liver enzymes are normal   - There is no blood or protein present in the urine which is reassuring.     In regards to additional recommendations, we have follow-up planned for June 11. We will repeat labs at that time to reevaluate kidney function with the changes made. Please call or MyChart with questions or concerns.         Sincerely,      Ra Betancur DO    Division of Renal Diseases and Hypertension  [facility]  KW/gw              Component      Latest Ref Rng & Units 4/22/2019   WBC      4.0 - 11.0 10e9/L 10.5   RBC Count      3.8 - 5.2 10e12/L 4.42   Hemoglobin      11.7 - 15.7 g/dL 13.3   Hematocrit      35.0 - 47.0 % 40.2   MCV      78 - 100 fl 91   MCH      26.5 - 33.0 pg 30.1   MCHC      31.5 - 36.5 g/dL 33.1   RDW      10.0 - 15.0 % 12.4   Platelet Count      150 - 450 10e9/L 256   % Neutrophils      % 69.2   % Lymphocytes      % 20.6   % Monocytes      % 5.6   % Eosinophils      % 3.2   % Basophils      % 0.8   % Immature Granulocytes      % 0.6   Absolute Neutrophil      1.6 - 8.3 10e9/L 7.3   Absolute Lymphocytes      0.8 - 5.3 10e9/L 2.2   Absolute Monocytes      0.0 - 1.3 10e9/L 0.6   Absolute Eosinophils      0.0 - 0.7 10e9/L 0.3   Absolute Basophils      0.0 - 0.2 10e9/L 0.1   Abs Immature Granulocytes      0 - 0.4 10e9/L 0.1   Diff Method       Automated Method   Sodium      133 - 144 mmol/L 139   Potassium      3.4 - 5.3 mmol/L 4.7   Chloride      94 - 109 mmol/L 109   Carbon Dioxide      20 - 32 mmol/L 25   Anion Gap      3 - 14 mmol/L 5   Glucose      70  - 99 mg/dL 113 (H)   Urea Nitrogen      7 - 30 mg/dL 22   Creatinine      0.52 - 1.04 mg/dL 1.56 (H)   GFR Estimate      >60 mL/min/1.73:m2 32 (L)   GFR Estimate If Black      >60 mL/min/1.73:m2 37 (L)   Calcium      8.5 - 10.1 mg/dL 9.6   Bilirubin Total      0.2 - 1.3 mg/dL 0.5   Albumin      3.4 - 5.0 g/dL 3.9   Protein Total      6.8 - 8.8 g/dL 7.9   Alkaline Phosphatase      40 - 150 U/L 70   ALT      0 - 50 U/L 31   AST      0 - 45 U/L 21   Color Urine       Light Yellow   Appearance Urine       Clear   Glucose Urine      NEG:Negative mg/dL Negative   Bilirubin Urine      NEG:Negative Negative   Ketones Urine      NEG:Negative mg/dL Negative   Specific Gravity Urine      1.003 - 1.035 1.008   Blood Urine      NEG:Negative Negative   pH Urine      5.0 - 7.0 pH 5.0   Protein Albumin Urine      NEG:Negative mg/dL Negative   Urobilinogen mg/dL      0.0 - 2.0 mg/dL Normal   Nitrite Urine      NEG:Negative Negative   Leukocyte Esterase Urine      NEG:Negative Negative   Source       Midstream Urine   Albumin Fraction      3.7 - 5.1 g/dL 4.3   Alpha 1 Fraction      0.2 - 0.4 g/dL 0.3   Alpha 2 Fraction      0.5 - 0.9 g/dL 0.8   Beta Fraction      0.6 - 1.0 g/dL 0.8   Gamma Fraction      0.7 - 1.6 g/dL 1.1   Monoclonal Peak      0.0 g/dL 0.0   ELP Interpretation:       Essentially normal electrophoretic pattern. No monoclonal protein seen. Pathologic . . .   Creatinine Urine      mg/dL 74   Albumin Urine mg/L      mg/L 11   Albumin Urine mg/g Cr      0 - 25 mg/g Cr 14.96   Protein Random Urine      g/L 0.10   Protein Total Urine g/gr Creatinine      0 - 0.2 g/g Cr 0.14   Parathyroid Hormone Intact      18 - 80 pg/mL 90 (H)

## 2019-04-22 NOTE — PATIENT INSTRUCTIONS
1. Lab and urine today  2. Stop lisinopril/HCTZ  3. Start Nifedipine XL 30 mg daily  4. If blood pressure consistently greater than 140/90, please call [phone] (Janet or Ladonna)  5. If swelling, please call us at [phone]  6. Low sodium diet (goal of 2000 mg per day).   7. Follow-up in 6-8 weeks as a tentative plan for now.

## 2019-04-22 NOTE — PROGRESS NOTES
April 22, 2019    I was asked to see this patient by Dr. Hernandez for evaluation of KAYE on CKD.     CC: KAYE on CKD    HPI: Meryl Fournier is a 73 year old female who presents for evaluation of KAYE on CKD. On review of Ms. Fournier's creatinine levels, they were 0.99-1.27 from 2003 to Dec 2015; 1.3-1.5 from 6208-2066; but higher to 1.64 in May 2018 and 1.88 in Feb this year. IN regards to risk factors for kidney disease, she has no hx of diabetes but does have hypertension which is controlled with lisinopril/HCTZ 20/12.5 taken BID. Addt risk factors for kidney disease include multiple UTIs previously as well as hx of kidney stones. She reports her stone hx starting in her 30s and would have 1-2 episodes per year but has not had this issue more recently for the past 2-3 years. UTIs were also more of an issue previously. On ROS she describes noting darker urine with more of a strong smell to the urine beginning around XMAS. She was found to have BV treated with metronidazole recently. Her blood pressure is always higher in clinic per her report - at home it is typically in the low 130s or lower. She admits to not being a great water drinker; drinks a lot of coffee. She was previously on PPI therapy for her GERD but this was changed to zantac ~ 2 years ago per her report). She denies diarrhea, if anything, will be constipated.    She had an ultrasound prior to her appt today - normal sized kidneys without abnormality but noted to have echogenic liver. She denies any hepatitis B exposure, no etoh intake. On ROS she reports some GI upset and decreased appetite. When asked about difficulty swallowing, she reported not consistent issue but also didn't deny it being an issue. She previously smoked but then quit - she recently restarted.     - History of Hematuria: no  - Swelling: at the end of the day she notes a sockline. She uses an OTC water pillwhen needed but this is not often  - Hx of UTIs: previously had recurrent  "UTIs  - Hx of stones: in her 30s - hasn't had one for 2-3 years  - Rashes/Joint pain: no rash, joint pain in general is noted - knees, back - she describes this as fibromyalgia pain  - Family hx of kidney disease: father had kidney related issues later in life - no specifics  - NSAID use: no       Allergies   Allergen Reactions     Hmg-Coa-R Inhibitors      \"vomiting\"     Lexapro [Escitalopram]          Current Outpatient Medications on File Prior to Visit:  aspirin 81 MG tablet Take  by mouth daily.   cholecalciferol 5000 UNITS CAPS Take 1 tablet by mouth daily.   clonazePAM (KLONOPIN) 0.5 MG tablet Take 1 tablet (0.5 mg) by mouth daily as needed for anxiety   Coenzyme Q10 (COQ10 PO) Take 100 mg by mouth   Flaxseed, Linseed, (FLAXSEED OIL) 1000 MG CAPS    Krill Oil 300 MG CAPS Take 1 tablet by mouth daily Pt reports taking-500mg  daily   levothyroxine (SYNTHROID/LEVOTHROID) 88 MCG tablet Take 1 tablet (88 mcg) by mouth daily   ranitidine (ZANTAC) 150 MG tablet Take 1 tablet (150 mg) by mouth 2 times daily   STATIN NOT PRESCRIBED, INTENTIONAL, Statin not prescribed intentionally due to Intolerance (with supporting documentation of trying a statin at least once within the last 5 years) (Patient not taking: Reported on 2018)     No current facility-administered medications on file prior to visit.     Past Medical History:   Diagnosis Date     Absence of menstruation [id number]     Esophageal reflux      Other anxiety states      Unspecified hypothyroidism        Past Surgical History:   Procedure Laterality Date     SURGICAL HISTORY OF -       TUBAL LIGATION     SURGICAL HISTORY OF -       COLONOSCOPY NL EXAM     SURGICAL HISTORY OF -       GASTROSCOPY - NL EXAM       Social History     Tobacco Use     Smoking status: Former Smoker     Packs/day: 0.50     Years: 51.00     Pack years: 25.50     Types: Cigarettes     Start date: 1963     Last attempt to quit: 2014     Years since quittin.7     " Smokeless tobacco: Never Used     Tobacco comment: using E-cigs   Substance Use Topics     Alcohol use: No     Drug use: No       Family History   Problem Relation Age of Onset     Cancer - colorectal Mother      Arthritis Mother      Eye Disorder Mother      Thyroid Disease Mother         goiter removed     Heart Disease Father      Depression Father      Arthritis Father      Kidney Disease Father         issues later in life     Thyroid Disease Daughter      Eye Disorder Daughter         due to graves disease       ROS: A 12 system review of systems was negative other than noted here or above.     Exam:  /70 (BP Location: Left arm, Patient Position: Sitting, Cuff Size: Adult Large)   Pulse 63   Wt 87.5 kg (193 lb)   SpO2 97%   BMI 32.16 kg/m      GENERAL APPEARANCE: alert and no distress  EYES: PERRL, no scleral icterus  HENT: mouth without ulcers or lesions  NECK: supple, there is a prominent lymph node appreciated on the left neck - she reports some tenderness on palpation  RESP: lungs clear to auscultation   CV: regular rhythm, normal rate, no rub  Extremities: no clubbing, cyanosis, or edema  SKIN: no rash  NEURO: mentation intact and speech normal  PSYCH: affect normal/bright    Results:    Office Visit on 04/22/2019   Component Date Value Ref Range Status     Color Urine 04/22/2019 Light Yellow   Final     Appearance Urine 04/22/2019 Clear   Final     Glucose Urine 04/22/2019 Negative  NEG^Negative mg/dL Final     Bilirubin Urine 04/22/2019 Negative  NEG^Negative Final     Ketones Urine 04/22/2019 Negative  NEG^Negative mg/dL Final     Specific Gravity Urine 04/22/2019 1.008  1.003 - 1.035 Final     Blood Urine 04/22/2019 Negative  NEG^Negative Final     pH Urine 04/22/2019 5.0  5.0 - 7.0 pH Final     Protein Albumin Urine 04/22/2019 Negative  NEG^Negative mg/dL Final     Urobilinogen mg/dL 04/22/2019 Normal  0.0 - 2.0 mg/dL Final     Nitrite Urine 04/22/2019 Negative  NEG^Negative Final      Leukocyte Esterase Urine 04/22/2019 Negative  NEG^Negative Final     Source 04/22/2019 Midstream Urine   Final     WBC 04/22/2019 10.5  4.0 - 11.0 10e9/L Final     RBC Count 04/22/2019 4.42  3.8 - 5.2 10e12/L Final     Hemoglobin 04/22/2019 13.3  11.7 - 15.7 g/dL Final     Hematocrit 04/22/2019 40.2  35.0 - 47.0 % Final     MCV 04/22/2019 91  78 - 100 fl Final     MCH 04/22/2019 30.1  26.5 - 33.0 pg Final     MCHC 04/22/2019 33.1  31.5 - 36.5 g/dL Final     RDW 04/22/2019 12.4  10.0 - 15.0 % Final     Platelet Count 04/22/2019 256  150 - 450 10e9/L Final     % Neutrophils 04/22/2019 69.2  % Final     % Lymphocytes 04/22/2019 20.6  % Final     % Monocytes 04/22/2019 5.6  % Final     % Eosinophils 04/22/2019 3.2  % Final     % Basophils 04/22/2019 0.8  % Final     % Immature Granulocytes 04/22/2019 0.6  % Final     Absolute Neutrophil 04/22/2019 7.3  1.6 - 8.3 10e9/L Final     Absolute Lymphocytes 04/22/2019 2.2  0.8 - 5.3 10e9/L Final     Absolute Monocytes 04/22/2019 0.6  0.0 - 1.3 10e9/L Final     Absolute Eosinophils 04/22/2019 0.3  0.0 - 0.7 10e9/L Final     Absolute Basophils 04/22/2019 0.1  0.0 - 0.2 10e9/L Final     Abs Immature Granulocytes 04/22/2019 0.1  0 - 0.4 10e9/L Final     Diff Method 04/22/2019 Automated Method   Final     Sodium 04/22/2019 139  133 - 144 mmol/L Final     Potassium 04/22/2019 4.7  3.4 - 5.3 mmol/L Final     Chloride 04/22/2019 109  94 - 109 mmol/L Final     Carbon Dioxide 04/22/2019 25  20 - 32 mmol/L Final     Anion Gap 04/22/2019 5  3 - 14 mmol/L Final     Glucose 04/22/2019 113* 70 - 99 mg/dL Final     Urea Nitrogen 04/22/2019 22  7 - 30 mg/dL Final     Creatinine 04/22/2019 1.56* 0.52 - 1.04 mg/dL Final     GFR Estimate 04/22/2019 32* >60 mL/min/[1.73_m2] Final    Comment: Non  GFR Calc  Starting 12/18/2018, serum creatinine based estimated GFR (eGFR) will be   calculated using the Chronic Kidney Disease Epidemiology Collaboration   (CKD-EPI) equation.       GFR  Estimate If Black 04/22/2019 37* >60 mL/min/[1.73_m2] Final    Comment:  GFR Calc  Starting 12/18/2018, serum creatinine based estimated GFR (eGFR) will be   calculated using the Chronic Kidney Disease Epidemiology Collaboration   (CKD-EPI) equation.       Calcium 04/22/2019 9.6  8.5 - 10.1 mg/dL Final     Bilirubin Total 04/22/2019 0.5  0.2 - 1.3 mg/dL Final     Albumin 04/22/2019 3.9  3.4 - 5.0 g/dL Final     Protein Total 04/22/2019 7.9  6.8 - 8.8 g/dL Final     Alkaline Phosphatase 04/22/2019 70  40 - 150 U/L Final     ALT 04/22/2019 31  0 - 50 U/L Final     AST 04/22/2019 21  0 - 45 U/L Final          Assessment/Plan:   1. CKD Stage 4: Looking back, she has had CKD Stage 3 dating back to 2003 but with acute decline noted more over the past year. She is at risk for CKD from her recurrent UTIs/stone hx from the past but this has not been an issue recently so seems less likely to be the cause of recent decline. UA and UMACR were recently normal and ultrasound of kidneys was normal.  I will repeat urine studies again today for reassurance. I do question a hemodyamic component in the setting of poor fluid intake while being on lisinopril/HCTZ. I recommended to hold the lisinopril/HCTZ for the time being and plan to repeat labs again in 2 weeks. Encouraged a minimum of 2.5 liters fluid intake per day. Educated to avoid NSAIDs as she has been. If no improvement in kidney function with stopping the lisinopril/HCTZ, I would consider kidney biopsy given no obvious cause of her rising creatinine.     2. Echogenic liver: noted on ultrasound - CMP being done today. On review of her ultrasound from 2003, she had bile duct dilation. I offered ultrasound of the liver but she would like to start with the CMP. I will update Dr. Hernandez on this finding as well.     3. Enlarged lymph node: noted on her exam today on the left side of her neck. It is not clear whether she is having difficulty swallowing. Will discuss  with Dr. Hernandez next steps given much of her care she would like to keep in [place] given closer to home.     4. Hypothyroidism: TSH normal in Feb.     5. Hypertensin: as noted above, will discontinue the lisinopril/HCTZ and instead start nifedipine XL 30 mg daily. She will monitor BP at home and update if above goal.  Ideally will keep blood pressure less than 130/80 but in the interim while trialing away from ACE-I and diuretic, would be ok even with 140/90 (long term goal however will be <130/80). Educated on low sodium diet.    6. Hx of kidney stones: has not been an issue for her recently so encouraged good hydration for now - goal will be 2.5 liters of urine output per day.     Patient Instructions   1. Lab and urine today  2. Stop lisinopril/HCTZ  3. Start Nifedipine XL 30 mg daily  4. If blood pressure consistently greater than 140/90, please call [phone] (Janet or Ladonna)  5. If swelling, please call us at [phone]  6. Low sodium diet (goal of 2000 mg per day).   7. Follow-up in 6-8 weeks as a tentative plan for now.          Ra Betancur, DO

## 2019-04-23 ENCOUNTER — TELEPHONE (OUTPATIENT)
Dept: INTERNAL MEDICINE | Facility: CLINIC | Age: 74
End: 2019-04-23

## 2019-04-23 DIAGNOSIS — R93.2 ABNORMAL ULTRASOUND OF LIVER: Primary | ICD-10-CM

## 2019-04-23 DIAGNOSIS — R59.9 ENLARGED LYMPH NODES: ICD-10-CM

## 2019-04-23 NOTE — TELEPHONE ENCOUNTER
Received message from her nephrologist about an abnormal appearance of the liver on kidney ultrasound.  Recommend liver ultrasound, order placed.  She was also noted to have an enlarged lymph node in her neck on exam.  Order for neck ultrasound placed.    Please call patient to notify her.  Neither is urgent

## 2019-04-23 NOTE — TELEPHONE ENCOUNTER
Patient is called and told of the US orders placed for her and the numbers to call to schedule. Carmen ROMERO RN

## 2019-04-24 LAB
ALBUMIN SERPL ELPH-MCNC: 4.3 G/DL (ref 3.7–5.1)
ALPHA1 GLOB SERPL ELPH-MCNC: 0.3 G/DL (ref 0.2–0.4)
ALPHA2 GLOB SERPL ELPH-MCNC: 0.8 G/DL (ref 0.5–0.9)
B-GLOBULIN SERPL ELPH-MCNC: 0.8 G/DL (ref 0.6–1)
GAMMA GLOB SERPL ELPH-MCNC: 1.1 G/DL (ref 0.7–1.6)
M PROTEIN SERPL ELPH-MCNC: 0 G/DL
PROT PATTERN SERPL ELPH-IMP: NORMAL

## 2019-05-08 DIAGNOSIS — N18.4 CKD (CHRONIC KIDNEY DISEASE) STAGE 4, GFR 15-29 ML/MIN (H): Primary | ICD-10-CM

## 2019-05-08 DIAGNOSIS — N25.81 SECONDARY RENAL HYPERPARATHYROIDISM (H): ICD-10-CM

## 2019-05-15 ENCOUNTER — HOSPITAL ENCOUNTER (OUTPATIENT)
Dept: ULTRASOUND IMAGING | Facility: CLINIC | Age: 74
Discharge: HOME | End: 2019-05-15
Attending: INTERNAL MEDICINE | Admitting: INTERNAL MEDICINE
Payer: COMMERCIAL

## 2019-05-15 ENCOUNTER — HOSPITAL ENCOUNTER (OUTPATIENT)
Dept: ULTRASOUND IMAGING | Facility: CLINIC | Age: 74
End: 2019-05-15
Attending: INTERNAL MEDICINE
Payer: COMMERCIAL

## 2019-05-15 DIAGNOSIS — K83.8 DILATED BILE DUCT: Primary | ICD-10-CM

## 2019-05-15 DIAGNOSIS — R93.2 ABNORMAL ULTRASOUND OF LIVER: ICD-10-CM

## 2019-05-15 DIAGNOSIS — R59.9 ENLARGED LYMPH NODES: ICD-10-CM

## 2019-05-15 PROCEDURE — 76705 ECHO EXAM OF ABDOMEN: CPT

## 2019-05-15 PROCEDURE — 76536 US EXAM OF HEAD AND NECK: CPT

## 2019-05-15 NOTE — RESULT ENCOUNTER NOTE
The ultrasound of the liver is essentially normal.  There is however a dilation of the common hepatic duct without other abnormalities seen.  It is difficult to know what to make of this.  Sometimes this can be caused by a gallstone that has passed. Sometimes it can mean something more serious like a blockage in the liver due to a cyst or tumor.  Because your liver enzymes are normal, and this was found 'incidentally' and not due to symptoms of abdominal pain, nausea, vomiting, unintentional weight -this is likely nothing to worry about.    Recommend follow-up ultrasound in 6 months, order was placed.    If you develop any symptoms of abdominal pain, nausea, vomiting, jaundiced (yellowing of the skin and eyes), be seen right away

## 2019-07-17 ENCOUNTER — DOCUMENTATION ONLY (OUTPATIENT)
Dept: INTERNAL MEDICINE | Facility: CLINIC | Age: 74
End: 2019-07-17

## 2019-07-17 DIAGNOSIS — E78.5 HYPERLIPIDEMIA LDL GOAL <130: Primary | Chronic | ICD-10-CM

## 2019-07-17 NOTE — PROGRESS NOTES
Patient is contacted and she is planning on being to lab tomorrow at 9:10 and doing labs for Dr. Betancur.  We have discussed a Lipid panel and she would like this also.  Sherron. Carmen ROMERO RN

## 2019-07-18 DIAGNOSIS — N25.81 SECONDARY RENAL HYPERPARATHYROIDISM (H): ICD-10-CM

## 2019-07-18 DIAGNOSIS — N18.4 CKD (CHRONIC KIDNEY DISEASE) STAGE 4, GFR 15-29 ML/MIN (H): ICD-10-CM

## 2019-07-18 DIAGNOSIS — E78.5 HYPERLIPIDEMIA LDL GOAL <130: Chronic | ICD-10-CM

## 2019-07-18 LAB
ALBUMIN SERPL-MCNC: 3.7 G/DL (ref 3.4–5)
ANION GAP SERPL CALCULATED.3IONS-SCNC: 4 MMOL/L (ref 3–14)
BUN SERPL-MCNC: 22 MG/DL (ref 7–30)
CALCIUM SERPL-MCNC: 9.2 MG/DL (ref 8.5–10.1)
CHLORIDE SERPL-SCNC: 109 MMOL/L (ref 94–109)
CHOLEST SERPL-MCNC: 263 MG/DL
CO2 SERPL-SCNC: 31 MMOL/L (ref 20–32)
CREAT SERPL-MCNC: 1.53 MG/DL (ref 0.52–1.04)
CREAT UR-MCNC: 93 MG/DL
GFR SERPL CREATININE-BSD FRML MDRD: 33 ML/MIN/{1.73_M2}
GLUCOSE SERPL-MCNC: 110 MG/DL (ref 70–99)
HDLC SERPL-MCNC: 45 MG/DL
LDLC SERPL CALC-MCNC: 173 MG/DL
NONHDLC SERPL-MCNC: 218 MG/DL
PHOSPHATE SERPL-MCNC: 3.6 MG/DL (ref 2.5–4.5)
POTASSIUM SERPL-SCNC: 4.6 MMOL/L (ref 3.4–5.3)
PROT UR-MCNC: 0.19 G/L
PROT/CREAT 24H UR: 0.2 G/G CR (ref 0–0.2)
SODIUM SERPL-SCNC: 144 MMOL/L (ref 133–144)
TRIGL SERPL-MCNC: 227 MG/DL

## 2019-07-18 PROCEDURE — 36415 COLL VENOUS BLD VENIPUNCTURE: CPT | Performed by: INTERNAL MEDICINE

## 2019-07-18 PROCEDURE — 80061 LIPID PANEL: CPT | Performed by: INTERNAL MEDICINE

## 2019-07-18 PROCEDURE — 82306 VITAMIN D 25 HYDROXY: CPT | Performed by: INTERNAL MEDICINE

## 2019-07-18 PROCEDURE — 80069 RENAL FUNCTION PANEL: CPT | Performed by: INTERNAL MEDICINE

## 2019-07-18 PROCEDURE — 84156 ASSAY OF PROTEIN URINE: CPT | Performed by: INTERNAL MEDICINE

## 2019-07-18 NOTE — LETTER
"[facility]  [address]  [facility] [address]  Phone: [phone]  Fax: [phone]    July 18, 2019    Meryl Fournier  [address] [facility] [address]          Dear Ms. Fournier,    I am writing to inform you of the results of the laboratory tests you had done recently.     Lab Results   Component Value Date    CHOL 263 07/18/2019          Lab Results   Component Value Date    HDL 45 07/18/2019            Lab Results   Component Value Date     07/18/2019        Lab Results   Component Value Date    TRIG 227 07/18/2019         HDL is the \"good\" cholesterol and when it is high (over 60), it decreases the risk for heart attack and stroke. When the HDL is less than 40, it increases the risk for these problems. The HDL can be raised by regular exercise and sometimes medications, such as niacin.    LDL is the \"bad\" cholesterol linked to heart disease and stroke. For those who have heart disease or diabetes, their LDL should be less than 100. For most everyone else, the LDL should be less than 130. For those with no risk factors, under 160 is acceptable.    Your triglycerides should be less than 150. These can be lowered with a low fat diet, reducing alcohol use, losing weight and, for those with diabetes, by improving blood sugar control.    Slight variations and daily fluctuations are normal and should cause little concern. As you know, an elevated cholesterol is one factor in increasing your risk of heart disease or stroke. You can improve your cholesterol by controlling the amount and type of fat you eat and by increasing your daily activity level.    Based on these results:  Cholesterol remains very elevated, slightly higher than last check two years ago.  Will discuss further at upcoming visit on August 7, 2019 at 11:00 am in [place].    It is my pleasure to help partake in your healthcare needs. Please feel free to call the clinic if you have any further questions or " problems.    Sincerely,        Nora Hernandez DO / bmc

## 2019-07-18 NOTE — LETTER
August 1, 2019      Meryl Fournier  [address] [facility] [address]              Dear Meryl,    Your vitamin D level is normal. There is no protein present in the urine. Your kidney function is stable but not normal as you are aware.   Please call or MyChart with questions or concerns.  [phone].      Sincerely,      Ra Betancur DO    Division of Renal Diseases and Hypertension  [facility]  KW/gw    Component      Latest Ref Rng & Units 7/18/2019   Sodium      133 - 144 mmol/L 144   Potassium      3.4 - 5.3 mmol/L 4.6   Chloride      94 - 109 mmol/L 109   Carbon Dioxide      20 - 32 mmol/L 31   Anion Gap      3 - 14 mmol/L 4   Glucose      70 - 99 mg/dL 110 (H)   Urea Nitrogen      7 - 30 mg/dL 22   Creatinine      0.52 - 1.04 mg/dL 1.53 (H)   GFR Estimate      >60 mL/min/1.73:m2 33 (L)   GFR Estimate If Black      >60 mL/min/1.73:m2 38 (L)   Calcium      8.5 - 10.1 mg/dL 9.2   Phosphorus      2.5 - 4.5 mg/dL 3.6   Albumin      3.4 - 5.0 g/dL 3.7   25 OH Vit D2      ug/L <5   25 OH Vit D3      ug/L 43   25 OH Vit D total      20 - 75 ug/L <48   Protein Random Urine      g/L 0.19   Protein Total Urine g/gr Creatinine      0 - 0.2 g/g Cr 0.20   Creatinine Urine      mg/dL 93

## 2019-07-22 ENCOUNTER — OFFICE VISIT (OUTPATIENT)
Dept: NEPHROLOGY | Facility: CLINIC | Age: 74
End: 2019-07-22
Payer: COMMERCIAL

## 2019-07-22 VITALS
WEIGHT: 194.6 LBS | OXYGEN SATURATION: 96 % | SYSTOLIC BLOOD PRESSURE: 147 MMHG | DIASTOLIC BLOOD PRESSURE: 81 MMHG | BODY MASS INDEX: 32.42 KG/M2 | HEART RATE: 71 BPM

## 2019-07-22 DIAGNOSIS — N18.30 CKD (CHRONIC KIDNEY DISEASE) STAGE 3, GFR 30-59 ML/MIN (H): ICD-10-CM

## 2019-07-22 DIAGNOSIS — N20.0 CALCULUS OF KIDNEY: ICD-10-CM

## 2019-07-22 DIAGNOSIS — I10 BENIGN ESSENTIAL HYPERTENSION: Primary | ICD-10-CM

## 2019-07-22 DIAGNOSIS — N25.81 SECONDARY RENAL HYPERPARATHYROIDISM (H): ICD-10-CM

## 2019-07-22 LAB
DEPRECATED CALCIDIOL+CALCIFEROL SERPL-MC: <48 UG/L (ref 20–75)
VITAMIN D2 SERPL-MCNC: <5 UG/L
VITAMIN D3 SERPL-MCNC: 43 UG/L

## 2019-07-22 PROCEDURE — 99214 OFFICE O/P EST MOD 30 MIN: CPT | Performed by: INTERNAL MEDICINE

## 2019-07-22 RX ORDER — TERAZOSIN 1 MG/1
1 CAPSULE ORAL AT BEDTIME
Qty: 30 CAPSULE | Refills: 0 | Status: SHIPPED | OUTPATIENT
Start: 2019-07-22 | End: 2019-12-10 | Stop reason: ALTCHOICE

## 2019-07-22 NOTE — NURSING NOTE
Meryl Fournier's goals for this visit include: chronic kidney disease follow up  She requests these members of her care team be copied on today's visit information: Yes    PCP: Nora Hernandez    Referring Provider:  Ra Betancur MD  [address] [place] [id number]    /86 (BP Location: Right arm, Patient Position: Sitting, Cuff Size: Adult Regular)   Pulse 71   Wt 88.3 kg (194 lb 9.6 oz)   SpO2 96%   BMI 32.42 kg/m      Do you need any medication refills at today's visit? No

## 2019-07-22 NOTE — PATIENT INSTRUCTIONS
1. Stop the nifedipine  2. Start terazosin 1 mg to be taken at night  3. Please update if any difficulty with the new medication.  4. Labs in 3 months  5. Follow-up in 6 months

## 2019-08-07 ENCOUNTER — OFFICE VISIT (OUTPATIENT)
Dept: FAMILY MEDICINE | Facility: CLINIC | Age: 74
End: 2019-08-07
Payer: COMMERCIAL

## 2019-08-07 VITALS
WEIGHT: 195 LBS | HEART RATE: 73 BPM | DIASTOLIC BLOOD PRESSURE: 82 MMHG | SYSTOLIC BLOOD PRESSURE: 150 MMHG | BODY MASS INDEX: 32.49 KG/M2 | OXYGEN SATURATION: 95 % | TEMPERATURE: 97 F | RESPIRATION RATE: 12 BRPM

## 2019-08-07 DIAGNOSIS — Z12.11 COLON CANCER SCREENING: ICD-10-CM

## 2019-08-07 DIAGNOSIS — F41.1 GAD (GENERALIZED ANXIETY DISORDER): ICD-10-CM

## 2019-08-07 DIAGNOSIS — E78.5 HYPERLIPIDEMIA LDL GOAL <130: Chronic | ICD-10-CM

## 2019-08-07 DIAGNOSIS — N18.4 CKD (CHRONIC KIDNEY DISEASE) STAGE 4, GFR 15-29 ML/MIN (H): ICD-10-CM

## 2019-08-07 DIAGNOSIS — I25.10 CORONARY ARTERY DISEASE INVOLVING NATIVE CORONARY ARTERY OF NATIVE HEART WITHOUT ANGINA PECTORIS: Chronic | ICD-10-CM

## 2019-08-07 DIAGNOSIS — I10 BENIGN ESSENTIAL HYPERTENSION: Primary | Chronic | ICD-10-CM

## 2019-08-07 PROCEDURE — 99207 C PAF COMPLETED  NO CHARGE: CPT | Performed by: INTERNAL MEDICINE

## 2019-08-07 PROCEDURE — 99214 OFFICE O/P EST MOD 30 MIN: CPT | Performed by: INTERNAL MEDICINE

## 2019-08-07 RX ORDER — ATORVASTATIN CALCIUM 20 MG/1
20 TABLET, FILM COATED ORAL DAILY
Qty: 90 TABLET | Refills: 3 | Status: SHIPPED | OUTPATIENT
Start: 2019-08-07 | End: 2019-10-22

## 2019-08-07 RX ORDER — ALPRAZOLAM 0.5 MG
0.5 TABLET ORAL 2 TIMES DAILY
Qty: 60 TABLET | Refills: 5 | Status: SHIPPED | OUTPATIENT
Start: 2019-08-07 | End: 2020-03-05

## 2019-08-07 ASSESSMENT — PATIENT HEALTH QUESTIONNAIRE - PHQ9: SUM OF ALL RESPONSES TO PHQ QUESTIONS 1-9: 7

## 2019-08-07 NOTE — PROGRESS NOTES
Subjective     Meryl Fournier is a 74 year old female who presents to clinic today for the following health issues:    HPI         Hyperlipidemia Follow-Up      Are you having any of the following symptoms? (Select all that apply)  Shortness of breath, Chest pain or pressure and Pain in calves when walking 1-2 blocks    Are you regularly taking any medication or supplement to lower your cholesterol?   No    Are you having muscle aches or other side effects that you think could be caused by your cholesterol lowering medication?  No     Has tolerated lipitor in the past but was expensive in the remote past.    Intolerant to many other statins.      Hypertension Follow-up      Do you check your blood pressure regularly outside of the clinic? Yes     Are you following a low salt diet? Yes    Are your blood pressures ever more than 140 on the top number (systolic) OR more than 90 on the bottom number (diastolic), for example 140/90? Yes     Frustrated because of side effects of medications, lack of blood pressure control    With new meds, is having uncontrolled hypertension and leg swelling.  Leg swelling interferes with daily life    Lisinopril - can't use due to reduced GFR    Nifedipine - not working and caused swelling.    Watching salt in diet.      Depression and Anxiety Follow-Up    How are you doing with your depression since your last visit? Worsened because doesn't have energy    How are you doing with your anxiety since your last visit?  Worsened because doesn't have energy     Are you having other symptoms that might be associated with depression or anxiety? No    Have you had a significant life event? No     Do you have any concerns with your use of alcohol or other drugs? No     Feels the clonazepam is the cause of there fatigue.      Wonders about xanax or something else to help with anxiety.    Social History     Tobacco Use     Smoking status: Former Smoker     Packs/day: 0.50     Years: 51.00     Pack  years: 25.50     Types: Cigarettes     Start date: 1963     Last attempt to quit: 2014     Years since quittin.0     Smokeless tobacco: Never Used     Tobacco comment: using E-cigs   Substance Use Topics     Alcohol use: No     Drug use: No     PHQ 2017   PHQ-9 Total Score 6 8 7   Q9: Thoughts of better off dead/self-harm past 2 weeks Not at all Not at all Not at all     BRISSA-7 SCORE 2017   Total Score - - -   Total Score 6 3 15     No flowsheet data found.  No flowsheet data found.  In the past two weeks have you had thoughts of suicide or self-harm?  No.    Do you have concerns about your personal safety or the safety of others?   No    Suicide Assessment Five-step Evaluation and Treatment (SAFE-T)    Amount of exercise or physical activity: None    Problems taking medications regularly: No    Medication side effects: none    Diet: regular (no restrictions)    effexor caused side effects.  zoloft did not work.    buspar - poor sleep, unusual dreams.    Hydroxyzine - side effects           Current Outpatient Medications   Medication Sig Dispense Refill     aspirin 81 MG tablet Take  by mouth daily.  3     cholecalciferol 5000 UNITS CAPS Take 1 tablet by mouth daily. 30 capsule      clonazePAM (KLONOPIN) 0.5 MG tablet Take 1 tablet (0.5 mg) by mouth daily as needed for anxiety 30 tablet 5     Coenzyme Q10 (COQ10 PO) Take 100 mg by mouth       Flaxseed, Linseed, (FLAXSEED OIL) 1000 MG CAPS        Krill Oil 300 MG CAPS Take 1 tablet by mouth daily Pt reports taking-500mg  daily       levothyroxine (SYNTHROID/LEVOTHROID) 88 MCG tablet Take 1 tablet (88 mcg) by mouth daily 90 tablet 3     terazosin (HYTRIN) 1 MG capsule Take 1 capsule (1 mg) by mouth At Bedtime 30 capsule 0     ranitidine (ZANTAC) 150 MG tablet Take 1 tablet (150 mg) by mouth 2 times daily (Patient not taking: Reported on 2019) 180 tablet 3     STATIN NOT PRESCRIBED, INTENTIONAL, Statin not  prescribed intentionally due to Intolerance (with supporting documentation of trying a statin at least once within the last 5 years) (Patient not taking: Reported on 5/1/2018) 0 each 0         Reviewed and updated as needed this visit by Provider         Review of Systems   ROS COMP: Constitutional, HEENT, cardiovascular, pulmonary, gi and gu systems are negative, except as otherwise noted.      Objective    BP (!) 150/82   Pulse 73   Temp 97  F (36.1  C) (Tympanic)   Resp 12   Wt 88.5 kg (195 lb)   SpO2 95%   Breastfeeding? No   BMI 32.49 kg/m    Body mass index is 32.49 kg/m .  Physical Exam   GENERAL APPEARANCE: healthy, alert, no distress and over weight  PSYCH: mentation appears normal, affect normal/bright and worried          Assessment & Plan     1. Benign essential hypertension -patient is having leg swelling on her current regimen, and that she felt that she tolerated the lisinopril and hydrochlorothiazide much better without significant side effects and better blood pressure control.  She would like to restart these.  Will discuss with her nephrologist and come up with a comprehensive plan to manage her blood pressure and minimizing side effects.    2. CKD (chronic kidney disease) stage 4, GFR 15-29 ml/min (H)-lisinopril hydrochlorthiazide was stopped due to worsening kidney dysfunction.  Once they were stopped, her GFR rebounded slightly, but not significantly.    3. BRISSA (generalized anxiety disorder)-Long discussion with patient about the risk and benefits of chronic benzo use.  She is tried and failed many other medications for treatment of her anxiety.  Advised to stop the clonazepam and start the alprazolam twice daily.  Fatigue from clonazepam.  - ALPRAZolam (XANAX) 0.5 MG tablet; Take 1 tablet (0.5 mg) by mouth 2 times daily  Dispense: 60 tablet; Refill: 5    4. Hyperlipidemia LDL goal <130 -she reports she is tolerated Lipitor in the past, but was stopped due to cost.  Discussed that it is  generic and that cost should no longer be a factor.  If it is e expensive, would need to obtain a prior authorization since she has had side effects from most other statins  - atorvastatin (LIPITOR) 20 MG tablet; Take 1 tablet (20 mg) by mouth daily  Dispense: 90 tablet; Refill: 3  - Lipid panel reflex to direct LDL Fasting; Future    5. Coronary artery disease involving native coronary artery of native heart without angina pectoris -needs to be on a statin due to history of coronary artery disease    6. Colon cancer screening-patient would like to continue colon cancer screening  - Fecal colorectal cancer screen (FIT); Future       Patient Instructions   1. Will talk w/Dr. Betancur about changing your blood pressure regimen  2. Stop clonazepam, start xanax twice daily.  Follow-up if this is not helping  3. Start lipitor 20 mg once daily.  Check cholesterol in 2 months.      Return in about 1 month (around 9/7/2019) for If symptoms don't improve or if they worsen.    Dr. Nora Hernandez, DO  [facility]

## 2019-08-07 NOTE — PATIENT INSTRUCTIONS
1. Will talk w/Dr. Betancur about changing your blood pressure regimen  2. Stop clonazepam, start xanax twice daily.  Follow-up if this is not helping  3. Start lipitor 20 mg once daily.  Check cholesterol in 2 months.

## 2019-08-08 ENCOUNTER — TELEPHONE (OUTPATIENT)
Dept: INTERNAL MEDICINE | Facility: CLINIC | Age: 74
End: 2019-08-08

## 2019-08-09 NOTE — TELEPHONE ENCOUNTER
Prior Authorization Retail Medication Request    Medication/Dose: alprazolam  ICD code (if different than what is on RX):    Previously Tried and Failed:  Buspirone; hydroxyzine  Rationale:  Patient has used since 2012 off and on with success    Insurance Name:    Insurance ID:  Not provided  [facility] Key: [id number]    Pharmacy Information (if different than what is on RX)  Name:    Phone:

## 2019-08-12 NOTE — PROGRESS NOTES
"June 22, 2019    CC: CKD    HPI: Meryl Fournier is a 74 year old female who presents for follow-up of CKD. On review of Ms. Fournier's creatinine levels, they were 0.99-1.27 from 2003 to Dec 2015; 1.3-1.5 from 0873-4643; but higher to 1.64 in May 2018 and 1.88 in Feb this year. IN regards to risk factors for kidney disease, she has no hx of diabetes but does have hypertension which is controlled with lisinopril/HCTZ 20/12.5 taken BID. Addt risk factors for kidney disease include multiple UTIs previously as well as hx of kidney stones. She reports her stone hx starting in her 30s and would have 1-2 episodes per year but has not had this issue more recently for the past 2-3 years. UTIs were also more of an issue previously.    Her creatinine peaked at 1.88 in February but was down to 1.53 last week.  She feels she is still swollen at times.  She has difficulty with hydration but is not using NSAIDs.  Her blood pressure at home will range from 1 35-1 45.  No proteinuria.  She is taking cholecalciferol 5000 units daily.       Allergies   Allergen Reactions     Hmg-Coa-R Inhibitors      \"vomiting\"     Lexapro [Escitalopram]          Current Outpatient Medications on File Prior to Visit:  aspirin 81 MG tablet Take  by mouth daily.   cholecalciferol 5000 UNITS CAPS Take 1 tablet by mouth daily.   Coenzyme Q10 (COQ10 PO) Take 100 mg by mouth   Flaxseed, Linseed, (FLAXSEED OIL) 1000 MG CAPS    Krill Oil 300 MG CAPS Take 1 tablet by mouth daily Pt reports taking-500mg  daily   levothyroxine (SYNTHROID/LEVOTHROID) 88 MCG tablet Take 1 tablet (88 mcg) by mouth daily   ranitidine (ZANTAC) 150 MG tablet Take 1 tablet (150 mg) by mouth 2 times daily (Patient not taking: Reported on 8/7/2019)   STATIN NOT PRESCRIBED, INTENTIONAL, Statin not prescribed intentionally due to Intolerance (with supporting documentation of trying a statin at least once within the last 5 years) (Patient not taking: Reported on 5/1/2018)     No current " facility-administered medications on file prior to visit.     Past Medical History:   Diagnosis Date     Absence of menstruation 2585-4907     Esophageal reflux      Other anxiety states      Unspecified hypothyroidism        Past Surgical History:   Procedure Laterality Date     SURGICAL HISTORY OF -       TUBAL LIGATION     SURGICAL HISTORY OF -       COLONOSCOPY NL EXAM     SURGICAL HISTORY OF -       GASTROSCOPY - NL EXAM       Social History     Tobacco Use     Smoking status: Former Smoker     Packs/day: 0.50     Years: 51.00     Pack years: 25.50     Types: Cigarettes     Start date: 1963     Last attempt to quit: 2014     Years since quittin.0     Smokeless tobacco: Never Used     Tobacco comment: using E-cigs   Substance Use Topics     Alcohol use: No     Drug use: No       Family History   Problem Relation Age of Onset     Cancer - colorectal Mother      Arthritis Mother      Eye Disorder Mother      Thyroid Disease Mother         goiter removed     Heart Disease Father      Depression Father      Arthritis Father      Kidney Disease Father         issues later in life     Thyroid Disease Daughter      Eye Disorder Daughter         due to graves disease       ROS: A 4 system review of systems was negative other than noted here or above.     Exam:  /81 (BP Location: Right arm, Patient Position: Sitting, Cuff Size: Adult Regular)   Pulse 71   Wt 88.3 kg (194 lb 9.6 oz)   SpO2 96%   BMI 32.42 kg/m      GENERAL APPEARANCE: alert and no distress  RESP: lungs clear to auscultation   CV: regular rhythm, normal rate, no rub  Extremities: no clubbing, cyanosis, 0-trace edema bilaterally  SKIN: no rash  NEURO: mentation intact and speech normal  PSYCH: affect normal/bright    Results:   No visits with results within 1 Day(s) from this visit.   Latest known visit with results is:   Orders Only on 2019   Component Date Value Ref Range Status     25 OH Vit D2 2019 <5  ug/L  Final     25 OH Vit D3 07/18/2019 43  ug/L Final     25 OH Vit D total 07/18/2019 <48  20 - 75 ug/L Final    Comment: Season, race, dietary intake, and treatment affect the concentration of   25-hydroxy-Vitamin D. Values may decrease during winter months and increase   during summer months. Values 20-29 ug/L may indicate Vitamin D insufficiency   and values <20 ug/L may indicate Vitamin D deficiency.  This test was developed and its performance characteristics determined by the   [facility],  Special [facility] Laboratory. It has   not been cleared or approved by the FDA. The laboratory is regulated under   CLIA as qualified to perform high-complexity testing. This test is used for   clinical purposes. It should not be regarded as investigational or for   research.       Protein Random Urine 07/18/2019 0.19  g/L Final     Protein Total Urine g/gr Creatinine 07/18/2019 0.20  0 - 0.2 g/g Cr Final     Sodium 07/18/2019 144  133 - 144 mmol/L Final     Potassium 07/18/2019 4.6  3.4 - 5.3 mmol/L Final     Chloride 07/18/2019 109  94 - 109 mmol/L Final     Carbon Dioxide 07/18/2019 31  20 - 32 mmol/L Final     Anion Gap 07/18/2019 4  3 - 14 mmol/L Final     Glucose 07/18/2019 110* 70 - 99 mg/dL Final     Urea Nitrogen 07/18/2019 22  7 - 30 mg/dL Final     Creatinine 07/18/2019 1.53* 0.52 - 1.04 mg/dL Final     GFR Estimate 07/18/2019 33* >60 mL/min/[1.73_m2] Final    Comment: Non  GFR Calc  Starting 12/18/2018, serum creatinine based estimated GFR (eGFR) will be   calculated using the Chronic Kidney Disease Epidemiology Collaboration   (CKD-EPI) equation.       GFR Estimate If Black 07/18/2019 38* >60 mL/min/[1.73_m2] Final    Comment:  GFR Calc  Starting 12/18/2018, serum creatinine based estimated GFR (eGFR) will be   calculated using the Chronic Kidney Disease Epidemiology Collaboration   (CKD-EPI) equation.       Calcium 07/18/2019 9.2  8.5 - 10.1 mg/dL Final      Phosphorus 07/18/2019 3.6  2.5 - 4.5 mg/dL Final     Albumin 07/18/2019 3.7  3.4 - 5.0 g/dL Final     Creatinine Urine 07/18/2019 93  mg/dL Final     Cholesterol 07/18/2019 263* <200 mg/dL Final    Desirable:       <200 mg/dl     Triglycerides 07/18/2019 227* <150 mg/dL Final    Comment: Borderline high:  150-199 mg/dl  High:             200-499 mg/dl  Very high:       >499 mg/dl  Fasting specimen       HDL Cholesterol 07/18/2019 45* >49 mg/dL Final     LDL Cholesterol Calculated 07/18/2019 173* <100 mg/dL Final    Comment: Above desirable:  100-129 mg/dl  Borderline High:  130-159 mg/dL  High:             160-189 mg/dL  Very high:       >189 mg/dl       Non HDL Cholesterol 07/18/2019 218* <130 mg/dL Final    Comment: Above Desirable:  130-159 mg/dl  Borderline high:  160-189 mg/dl  High:             190-219 mg/dl  Very high:       >219 mg/dl         Assessment/Plan:   1. CKD Stage 3: Looking back, she has had CKD Stage 3 dating back to 2003 but with acute decline noted more over the past year. She is at risk for CKD from her recurrent UTIs/stone hx from the past but this has not been an issue recently so seems less likely to be the cause of recent decline.  At the time of her initial evaluation we stopped lisinopril/hydrochlorothiazide which may have improved the kidney function with avoiding the hemodynamic variability.  I recommend routine follow-up with labs in 3 months and follow-up in 6 months.    2. Echogenic liver: Dr. Hernandez now following, patient underwent ultrasound of the abdomen.    3. Enlarged lymph node: Reassuring ultrasound was done in May.    4. Hypothyroidism: TSH normal in Feb.     5. Hypertension: Ideal blood pressure is less than 130/80.  I stopped lisinopril hydrochlorothiazide with the hopes of avoiding hemodynamic variability previously.  Her blood pressure is near to goal at this time but she does not like the swelling that she has been appreciating on the nifedipine.  We will stop  nifedipine and trial Terazosin and 1 mg taken at night.  She should update if should she note any lightheadedness or dizziness on this medication.  If blood pressures consistently above goal, we could consider titrating the Terazosin and as can be tolerated.    6. Hx of kidney stones: has not been an issue for her recently so encouraged good hydration for now - goal will be 2.5 liters of urine output per day.     7.  Secondary hyperparathyroidism: PTH 90 in April.  Vitamin D level 48 at this time.    Patient Instructions   1. Stop the nifedipine  2. Start terazosin 1 mg to be taken at night  3. Please update if any difficulty with the new medication.  4. Labs in 3 months  5. Follow-up in 6 months         Ra Betancur, DO

## 2019-08-15 NOTE — TELEPHONE ENCOUNTER
Central Prior Authorization Team   Phone: [phone]    PA Initiation    Medication: alprazolam  Insurance Company: [employer] - Phone [phone] Fax [phone]  Pharmacy Filling the Rx: [id number] - [place], [place] - [address] [place] [address]  Filling Pharmacy Phone: [phone]  Filling Pharmacy Fax: [phone]  Start Date: 8/15/2019

## 2019-08-15 NOTE — TELEPHONE ENCOUNTER
Prior Authorization Approval    Authorization Effective Date: 7/16/2019  Authorization Expiration Date: 8/14/2020  Medication: alprazolam-APPROVED  Approved Dose/Quantity:    Reference #: [id number]   Insurance Company: [employer] - Phone [phone] Fax [phone]  Expected CoPay:       CoPay Card Available:      Foundation Assistance Needed:    Which Pharmacy is filling the prescription (Not needed for infusion/clinic administered): [id number] - [place], [place] - [address] [place] [address]  Pharmacy Notified: Yes  Patient Notified: Yes  **Instructed pharmacy to notify patient when script is ready to /ship.**

## 2019-08-20 ENCOUNTER — DOCUMENTATION ONLY (OUTPATIENT)
Dept: OTHER | Facility: CLINIC | Age: 74
End: 2019-08-20

## 2019-08-30 DIAGNOSIS — Z12.11 COLON CANCER SCREENING: ICD-10-CM

## 2019-08-30 PROCEDURE — 82274 ASSAY TEST FOR BLOOD FECAL: CPT | Performed by: INTERNAL MEDICINE

## 2019-08-30 NOTE — LETTER
September 4, 2019      Meryl Fournier  [address] [facility] [address]        Dear ,    We are writing to inform you of your test results.    FIT test negative for colon cancer.     Resulted Orders   Fecal colorectal cancer screen (FIT)   Result Value Ref Range    Occult Blood Scn FIT Negative NEG^Negative       If you have any questions or concerns, please call the clinic at the number listed above.       Sincerely,      Dr. Hernandez

## 2019-09-04 LAB — HEMOCCULT STL QL IA: NEGATIVE

## 2019-10-24 ENCOUNTER — ANESTHESIA EVENT (OUTPATIENT)
Dept: SURGERY | Facility: CLINIC | Age: 74
End: 2019-10-24
Payer: COMMERCIAL

## 2019-10-24 ASSESSMENT — LIFESTYLE VARIABLES: TOBACCO_USE: 1

## 2019-10-24 NOTE — H&P
[facility]  [facility]  [address]  [facility] [address]  [phone]  Dept: [phone]    OPHTHALMOLOGY PRE-OPERATIVE  HISTORY AND PHYSICAL    DATE OF H/P:  2019    DATE OF SURGERY:  2019  PROCEDURE:  Procedure(s):  Cataract Removal with Implant, Left Eye  LENS IMPLANT:  [id number] +24.0  REFRACTIVE GOAL:  PL Sph  SURGEON:  Tyree Franz MD    ANESTHESIA:  TOPICAL / MAC    OR CASE REQUIREMENTS:    DEMOGRAPHICS:  Demographic Information on Meryl Fournier:    Meryl Fournier  Gender: female  : 1945  [address]  [facility] [id number] [phone] (home)     Medical Record: [id number]  Social Security Number: [id number]  Pharmacy:    [id number] - [place], [place] - [address] [facility]  [id number] - [address]  Primary Care Provider: Nora Hernandez    Parent's names are: Data Unavailable (mother) and Data Unavailable (father).    Insurance: Payor: [facility] / Plan: [employer]ARE [employer] / Product Type: HMO /     OCULAR HISTORY:  Cataracts, each eye.    HISTORIES:  Past Medical History:   Diagnosis Date     Absence of menstruation 9176-7616     Esophageal reflux      Other anxiety states      Unspecified hypothyroidism        Past Surgical History:   Procedure Laterality Date     SURGICAL HISTORY OF -       TUBAL LIGATION     SURGICAL HISTORY OF -       COLONOSCOPY NL EXAM     SURGICAL HISTORY OF -       GASTROSCOPY - NL EXAM       Family History   Problem Relation Age of Onset     Cancer - colorectal Mother      Arthritis Mother      Eye Disorder Mother      Thyroid Disease Mother         goiter removed     Heart Disease Father      Depression Father      Arthritis Father      Kidney Disease Father         issues later in life     Thyroid Disease Daughter      Eye Disorder Daughter         due to graves disease       Social History     Tobacco Use     Smoking status: Former Smoker      "Packs/day: 0.50     Years: 51.00     Pack years: 25.50     Types: Cigarettes     Start date: 1963     Last attempt to quit: 2014     Years since quittin.2     Smokeless tobacco: Never Used     Tobacco comment: using E-cigs   Substance Use Topics     Alcohol use: No       MEDICATIONS:  No current facility-administered medications for this encounter.      Current Outpatient Medications   Medication Sig     ALPRAZolam (XANAX) 0.5 MG tablet Take 1 tablet (0.5 mg) by mouth 2 times daily     aspirin 81 MG tablet Take  by mouth daily.     cholecalciferol 5000 UNITS CAPS Take 1 tablet by mouth daily.     Coenzyme Q10 (COQ10 PO) Take 100 mg by mouth     Flaxseed, Linseed, (FLAXSEED OIL) 1000 MG CAPS      Krill Oil 300 MG CAPS Take 1 tablet by mouth daily Pt reports taking-500mg  daily     levothyroxine (SYNTHROID/LEVOTHROID) 88 MCG tablet Take 1 tablet (88 mcg) by mouth daily     terazosin (HYTRIN) 1 MG capsule Take 1 capsule (1 mg) by mouth At Bedtime     ranitidine (ZANTAC) 150 MG tablet TAKE 1 TABLET (150 MG) BY MOUTH 2 TIMES DAILY       ALLERGIES:     Allergies   Allergen Reactions     Hmg-Coa-R Inhibitors      \"vomiting\"     Lexapro [Escitalopram]        PERTINENT SYSTEMS REVIEW:    1. No - Do you have a history of heart attack, stroke, stent, bypass or surgery on an artery in the head, neck, heart or legs?  2. No - Do you ever have any pain or discomfort in your chest?  3. No - Do you have a history of  Heart Failure?  4. No - Are you troubled by shortness of breath when walking: On the level, up a slight hill or at night?  5. No - Do you currently have a cold, bronchitis or other respiratory infection?  6. No - Do you have a cough, shortness of breath or wheezing?  7. No - Do you sometimes get pains in the calves of your legs when you walk?  8. No - Do you or anyone in your family have previous history of blood clots?  9. No - Do you or does anyone in your family have a serious bleeding problem such as " prolonged bleeding following surgeries or cuts?  10. No - Have you ever had problems with anemia or been told to take iron pills?  11. No - Have you had any abnormal blood loss such as black, tarry or bloody stools, or abnormal vaginal bleeding?  12. No - Have you ever had a blood transfusion?  13. No - Have you or any of your relatives ever had problems with anesthesia?  14. No - Do you have sleep apnea, excessive snoring or daytime drowsiness?  15. No - Do you have any prosthetic heart valves?  16. No - Do you have prosthetic joints?    EXAMINATION:  Vitals were reviewed                     Vison:  Va, right - 20/40, left - 20/40;   BAT, right - 20/80, left - 20/80;  HEENT:  Cataract, otherwise unremarkable.  LUNGS:  Clear  CV:  Regular rate and rhythm without murmur  ABD:  Soft and nontender  NEURO:  Alert and nonfocal    IMPRESSION:  Patient cleared for ophthalmic surgery.  Low risk with monitored, light sedation.  I have assessed the patient's DVT risk, and no additional orders necessary.    PLAN:  Procedure(s):  Cataract Removal with Implant, Left Eye      Tyree Franz MD

## 2019-10-24 NOTE — ANESTHESIA PREPROCEDURE EVALUATION
Anesthesia Pre-Procedure Evaluation    Patient: Meryl Fournier   MRN: [id number] : 1945          Preoperative Diagnosis: cataract    Procedure(s):  Cataract Removal with Implant    Past Medical History:   Diagnosis Date     Absence of menstruation [id number]     Esophageal reflux      Other anxiety states      Unspecified hypothyroidism      Past Surgical History:   Procedure Laterality Date     SURGICAL HISTORY OF -       TUBAL LIGATION     SURGICAL HISTORY OF -       COLONOSCOPY NL EXAM     SURGICAL HISTORY OF -       GASTROSCOPY - NL EXAM       Anesthesia Evaluation     . Pt has had prior anesthetic. Type: General and MAC    No history of anesthetic complications          ROS/MED HX    ENT/Pulmonary:     (+)tobacco use, Past use , . .    Neurologic:  - neg neurologic ROS     Cardiovascular:     (+) Dyslipidemia, hypertension--CAD, --. : . . . :. .       METS/Exercise Tolerance:     Hematologic:  - neg hematologic  ROS       Musculoskeletal:  - neg musculoskeletal ROS       GI/Hepatic:     (+) GERD Asymptomatic on medication,       Renal/Genitourinary:     (+) chronic renal disease, type: CRI, Nephrolithiasis ,       Endo:     (+) thyroid problem hypothyroidism, Obesity, .      Psychiatric:     (+) psychiatric history anxiety and depression      Infectious Disease:  - neg infectious disease ROS       Malignancy:      - no malignancy   Other:    - neg other ROS                      Physical Exam  Normal systems: cardiovascular, pulmonary and dental    Airway   Mallampati: II  TM distance: >3 FB  Neck ROM: full    Dental     Cardiovascular       Pulmonary             Lab Results   Component Value Date    WBC 10.5 2019    HGB 13.3 2019    HCT 40.2 2019     2019    CRP 3.9 2018    SED 22 2018     2019    POTASSIUM 4.6 2019    CHLORIDE 109 2019    CO2 31 2019    BUN 22 2019    CR 1.53 (H) 2019     (H)  "07/18/2019    TRENTON 9.2 07/18/2019    PHOS 3.6 07/18/2019    ALBUMIN 3.7 07/18/2019    PROTTOTAL 7.9 04/22/2019    ALT 31 04/22/2019    AST 21 04/22/2019    ALKPHOS 70 04/22/2019    BILITOTAL 0.5 04/22/2019    PTT 52 (H) 02/10/2010    INR 0.97 02/10/2010    TSH 3.40 02/07/2019    T4 0.97 05/01/2018       Preop Vitals  BP Readings from Last 3 Encounters:   08/07/19 (!) 150/82   07/22/19 147/81   04/22/19 154/70    Pulse Readings from Last 3 Encounters:   08/07/19 73   07/22/19 71   04/22/19 63      Resp Readings from Last 3 Encounters:   08/07/19 12   02/07/19 12   05/09/17 16    SpO2 Readings from Last 3 Encounters:   08/07/19 95%   07/22/19 96%   04/22/19 97%      Temp Readings from Last 1 Encounters:   08/07/19 36.1  C (97  F) (Tympanic)    Ht Readings from Last 1 Encounters:   05/01/18 1.65 m (5' 4.96\")      Wt Readings from Last 1 Encounters:   08/07/19 88.5 kg (195 lb)    Estimated body mass index is 32.49 kg/m  as calculated from the following:    Height as of 5/1/18: 1.65 m (5' 4.96\").    Weight as of 8/7/19: 88.5 kg (195 lb).       Anesthesia Plan      History & Physical Review  History and physical reviewed and following examination; no interval change.    ASA Status:  3 .    NPO Status:  > 6 hours    Plan for MAC Maintenance will be Balanced.  Reason for MAC:  Procedure to face, neck, head or breast         Postoperative Care      Consents  Anesthetic plan, risks, benefits and alternatives discussed with:  Patient..                 SHERITA Soler CRNA  "

## 2019-10-28 ENCOUNTER — HOSPITAL ENCOUNTER (OUTPATIENT)
Facility: CLINIC | Age: 74
Discharge: HOME | End: 2019-10-28
Attending: OPHTHALMOLOGY | Admitting: OPHTHALMOLOGY
Payer: COMMERCIAL

## 2019-10-28 ENCOUNTER — ANESTHESIA (OUTPATIENT)
Dept: SURGERY | Facility: CLINIC | Age: 74
End: 2019-10-28
Payer: COMMERCIAL

## 2019-10-28 VITALS
BODY MASS INDEX: 30.53 KG/M2 | DIASTOLIC BLOOD PRESSURE: 74 MMHG | SYSTOLIC BLOOD PRESSURE: 154 MMHG | RESPIRATION RATE: 18 BRPM | HEIGHT: 66 IN | WEIGHT: 190 LBS | TEMPERATURE: 97.7 F | OXYGEN SATURATION: 99 %

## 2019-10-28 PROCEDURE — 37000012 ZZH ANESTHESIA CATARACT PACKAGE: Performed by: OPHTHALMOLOGY

## 2019-10-28 PROCEDURE — 25800030 ZZH RX IP 258 OP 636: Performed by: NURSE ANESTHETIST, CERTIFIED REGISTERED

## 2019-10-28 PROCEDURE — V2632 POST CHMBR INTRAOCULAR LENS: HCPCS | Performed by: OPHTHALMOLOGY

## 2019-10-28 PROCEDURE — 36000025 ZZH CATARACT SURGICAL PACKAGE: Performed by: OPHTHALMOLOGY

## 2019-10-28 PROCEDURE — 25000125 ZZHC RX 250: Performed by: OPHTHALMOLOGY

## 2019-10-28 PROCEDURE — 71000022 ZZH RECOVERY CATRACT PACKAGE: Performed by: OPHTHALMOLOGY

## 2019-10-28 PROCEDURE — 25000128 H RX IP 250 OP 636: Performed by: OPHTHALMOLOGY

## 2019-10-28 PROCEDURE — 25000125 ZZHC RX 250: Performed by: NURSE ANESTHETIST, CERTIFIED REGISTERED

## 2019-10-28 PROCEDURE — 25000128 H RX IP 250 OP 636: Performed by: NURSE ANESTHETIST, CERTIFIED REGISTERED

## 2019-10-28 DEVICE — EYE IMP IOL AMO PCL TECNIS ZCB00 24.0: Type: IMPLANTABLE DEVICE | Site: EYE | Status: FUNCTIONAL

## 2019-10-28 RX ORDER — ALBUTEROL SULFATE 0.83 MG/ML
2.5 SOLUTION RESPIRATORY (INHALATION) EVERY 4 HOURS PRN
Status: CANCELLED | OUTPATIENT
Start: 2019-10-28

## 2019-10-28 RX ORDER — SODIUM CHLORIDE, SODIUM LACTATE, POTASSIUM CHLORIDE, CALCIUM CHLORIDE 600; 310; 30; 20 MG/100ML; MG/100ML; MG/100ML; MG/100ML
INJECTION, SOLUTION INTRAVENOUS CONTINUOUS
Status: CANCELLED | OUTPATIENT
Start: 2019-10-28

## 2019-10-28 RX ORDER — PHENYLEPHRINE HYDROCHLORIDE 25 MG/ML
1 SOLUTION/ DROPS OPHTHALMIC
Status: COMPLETED | OUTPATIENT
Start: 2019-10-28 | End: 2019-10-28

## 2019-10-28 RX ORDER — BALANCED SALT SOLUTION 6.4; .75; .48; .3; 3.9; 1.7 MG/ML; MG/ML; MG/ML; MG/ML; MG/ML; MG/ML
SOLUTION OPHTHALMIC PRN
Status: DISCONTINUED | OUTPATIENT
Start: 2019-10-28 | End: 2019-10-28 | Stop reason: HOSPADM

## 2019-10-28 RX ORDER — CYCLOPENTOLATE HYDROCHLORIDE 10 MG/ML
1 SOLUTION/ DROPS OPHTHALMIC
Status: COMPLETED | OUTPATIENT
Start: 2019-10-28 | End: 2019-10-28

## 2019-10-28 RX ORDER — PROPARACAINE HYDROCHLORIDE 5 MG/ML
SOLUTION/ DROPS OPHTHALMIC PRN
Status: DISCONTINUED | OUTPATIENT
Start: 2019-10-28 | End: 2019-10-28 | Stop reason: HOSPADM

## 2019-10-28 RX ORDER — SODIUM CHLORIDE, SODIUM LACTATE, POTASSIUM CHLORIDE, CALCIUM CHLORIDE 600; 310; 30; 20 MG/100ML; MG/100ML; MG/100ML; MG/100ML
INJECTION, SOLUTION INTRAVENOUS CONTINUOUS
Status: DISCONTINUED | OUTPATIENT
Start: 2019-10-28 | End: 2019-10-28 | Stop reason: HOSPADM

## 2019-10-28 RX ORDER — LIDOCAINE 40 MG/G
CREAM TOPICAL
Status: DISCONTINUED | OUTPATIENT
Start: 2019-10-28 | End: 2019-10-28 | Stop reason: HOSPADM

## 2019-10-28 RX ORDER — TROPICAMIDE 10 MG/ML
1 SOLUTION/ DROPS OPHTHALMIC
Status: COMPLETED | OUTPATIENT
Start: 2019-10-28 | End: 2019-10-28

## 2019-10-28 RX ORDER — DEXAMETHASONE SODIUM PHOSPHATE 4 MG/ML
4 INJECTION, SOLUTION INTRA-ARTICULAR; INTRALESIONAL; INTRAMUSCULAR; INTRAVENOUS; SOFT TISSUE EVERY 10 MIN PRN
Status: CANCELLED | OUTPATIENT
Start: 2019-10-28

## 2019-10-28 RX ORDER — NALOXONE HYDROCHLORIDE 0.4 MG/ML
.1-.4 INJECTION, SOLUTION INTRAMUSCULAR; INTRAVENOUS; SUBCUTANEOUS
Status: CANCELLED | OUTPATIENT
Start: 2019-10-28 | End: 2019-10-29

## 2019-10-28 RX ORDER — ONDANSETRON 4 MG/1
4 TABLET, ORALLY DISINTEGRATING ORAL EVERY 30 MIN PRN
Status: CANCELLED | OUTPATIENT
Start: 2019-10-28

## 2019-10-28 RX ORDER — ONDANSETRON 2 MG/ML
4 INJECTION INTRAMUSCULAR; INTRAVENOUS EVERY 30 MIN PRN
Status: CANCELLED | OUTPATIENT
Start: 2019-10-28

## 2019-10-28 RX ORDER — LIDOCAINE HYDROCHLORIDE 20 MG/ML
JELLY TOPICAL PRN
Status: DISCONTINUED | OUTPATIENT
Start: 2019-10-28 | End: 2019-10-28 | Stop reason: HOSPADM

## 2019-10-28 RX ADMIN — CYCLOPENTOLATE HYDROCHLORIDE 1 DROP: 10 SOLUTION/ DROPS OPHTHALMIC at 10:03

## 2019-10-28 RX ADMIN — TROPICAMIDE 1 DROP: 10 SOLUTION/ DROPS OPHTHALMIC at 09:50

## 2019-10-28 RX ADMIN — CYCLOPENTOLATE HYDROCHLORIDE 1 DROP: 10 SOLUTION/ DROPS OPHTHALMIC at 09:49

## 2019-10-28 RX ADMIN — PHENYLEPHRINE HYDROCHLORIDE 1 DROP: 25 SOLUTION/ DROPS OPHTHALMIC at 09:44

## 2019-10-28 RX ADMIN — LIDOCAINE HYDROCHLORIDE 0.1 ML: 10 INJECTION, SOLUTION EPIDURAL; INFILTRATION; INTRACAUDAL; PERINEURAL at 10:04

## 2019-10-28 RX ADMIN — TROPICAMIDE 1 DROP: 10 SOLUTION/ DROPS OPHTHALMIC at 09:45

## 2019-10-28 RX ADMIN — CYCLOPENTOLATE HYDROCHLORIDE 1 DROP: 10 SOLUTION/ DROPS OPHTHALMIC at 09:45

## 2019-10-28 RX ADMIN — PHENYLEPHRINE HYDROCHLORIDE 1 DROP: 25 SOLUTION/ DROPS OPHTHALMIC at 09:49

## 2019-10-28 RX ADMIN — MIDAZOLAM 2 MG: 1 INJECTION INTRAMUSCULAR; INTRAVENOUS at 10:54

## 2019-10-28 RX ADMIN — PHENYLEPHRINE HYDROCHLORIDE 1 DROP: 25 SOLUTION/ DROPS OPHTHALMIC at 10:02

## 2019-10-28 RX ADMIN — SODIUM CHLORIDE, POTASSIUM CHLORIDE, SODIUM LACTATE AND CALCIUM CHLORIDE: 600; 310; 30; 20 INJECTION, SOLUTION INTRAVENOUS at 10:03

## 2019-10-28 RX ADMIN — TROPICAMIDE 1 DROP: 10 SOLUTION/ DROPS OPHTHALMIC at 10:02

## 2019-10-28 ASSESSMENT — MIFFLIN-ST. JEOR: SCORE: 1360.64

## 2019-10-28 NOTE — ANESTHESIA POSTPROCEDURE EVALUATION
Patient: Meryl Fournier    Procedure(s):  Cataract Removal with Implant    Diagnosis:cataract  Diagnosis Additional Information: No value filed.    Anesthesia Type:  MAC    Note:  Anesthesia Post Evaluation    Patient location during evaluation: Phase 2  Patient participation: Able to fully participate in evaluation  Level of consciousness: awake and alert  Pain management: adequate  Airway patency: patent  Cardiovascular status: acceptable and hemodynamically stable  Respiratory status: acceptable, room air and spontaneous ventilation  Hydration status: acceptable  PONV: none     Anesthetic complications: None          Last vitals:  Vitals:    10/28/19 0942   BP: (!) 154/74   Resp: 18   Temp: 36.5  C (97.7  F)   SpO2: 99%         Electronically Signed By: SHERITA Gonzales CRNA  October 28, 2019  10:56 AM

## 2019-10-28 NOTE — OP NOTE
CATARACT OPERATIVE NOTE    PATIENT: Meryl Fournier  DATE OF SURGERY: 10/28/2019  PREOPERATIVE DIAGNOSIS:  Senile Nuclear Cataract, Left eye  POSTOPERATIVE DIAGNOSIS:  Senile Nuclear Cataract, Left eye  OPERATIVE PROCEDURE:  Phacoemulsification and placement of intraocular lens  SURGEON:  Tyree Franz MD  ANESTHESIA:  Topical / MAC  EBL:  None  SPECIMENS:  None  COMPLICATIONS:  None    PROCEDURE:  The patient was brought to the operating room at [facility].  The left eye was prepped and draped in the usual fashion for cataract surgery.  A wire lid speculum was inserted.  A super sharp blade was used to make a paracentesis at the 5 O'clock position.  The super sharp blade was used to make a partial thickness temporal groove, which was 3 mm in length.  0.8 mL of non-preserved epi-Shugarcaine was injected into the anterior chamber.  Viscoelastic was used to inflate the anterior chamber through a cannula.  A 2.5 mm microkeratome was used to make a temporal clear corneal incision in a two-plane fashion.  A cystotome needle and forceps were used to make a capsulorrhexis.  Hydrodissection and hydrodelineation were performed with Balance Salt Solution.  The lens was then phacoemulsified and removed without complications.  The cortical material was removed with bimanual irrigation and aspiration.  The capsular bag was filled with viscoelastic.  A posterior chamber intraocular lens, preselected and recorded, was folded and inserted into the capsular bag.  The viscoelastic was removed with the irrigation and aspiration tip.  Balanced Salt Solution with Vigamox, 150mg/0.1mL, was used to refill the anterior chamber.  The wounds were checked for water tightness and required no suture.  The wire lid speculum was removed.  The patient's left eye was cleaned and a drop of each post-operative drop was placed, followed by a morgan shield.  The patient tolerated the procedure well, and there were no  complications.      Tyree Franz MD

## 2019-10-28 NOTE — ANESTHESIA CARE TRANSFER NOTE
Patient: Meryl Fournier    Procedure(s):  Cataract Removal with Implant    Diagnosis: cataract  Diagnosis Additional Information: No value filed.    Anesthesia Type:   MAC     Note:  Airway :Room Air  Patient transferred to:Phase II  Handoff Report: Identifed the Patient, Identified the Reponsible Provider, Reviewed the pertinent medical history, Discussed the surgical course, Reviewed Intra-OP anesthesia mangement and issues during anesthesia, Set expectations for post-procedure period and Allowed opportunity for questions and acknowledgement of understanding      Vitals: (Last set prior to Anesthesia Care Transfer)    CRNA VITALS  10/28/2019 1042 - 10/28/2019 1112      10/28/2019             Pulse:  63    SpO2:  99 %                Electronically Signed By: SHERITA Gonzales CRNA  October 28, 2019  11:12 AM

## 2019-10-28 NOTE — H&P
[facility]  [facility]  [address]  [facility] [address]  [phone]  Dept: [phone]    OPHTHALMOLOGY PRE-OPERATIVE  HISTORY AND PHYSICAL    DATE OF H/P:  10/28/2019    DATE OF SURGERY:  2019  PROCEDURE:  Procedure(s):  Cataract Removal with Implant, Left Eye  SURGEON:  Tyree Franz MD    ANESTHESIA:  TOPICAL / MAC    OR CASE REQUIREMENTS:    DEMOGRAPHICS:  Demographic Information on Meryl Fournier:    Meryl Fournier  Gender: female  : 1945  [address]  [facility] [address] [phone] (home)     Medical Record: [id number]  Social Security Number: [id number]  Pharmacy:    [id number] - Count includes the [facility] [address] [facility]  [facility] - [place], [place] - [address] [place] [address]  Primary Care Provider: Nora Hernandez    Parent's names are: Data Unavailable (mother) and Data Unavailable (father).    Insurance: Payor: [facility] / Plan: [employer]ICARE [employer] / Product Type: HMO /     OCULAR HISTORY:  Cataracts, each eye.    HISTORIES:  Past Medical History:   Diagnosis Date     Absence of menstruation 5611-5729     Esophageal reflux      Other anxiety states      Unspecified hypothyroidism        Past Surgical History:   Procedure Laterality Date     SURGICAL HISTORY OF -       TUBAL LIGATION     SURGICAL HISTORY OF -       COLONOSCOPY NL EXAM     SURGICAL HISTORY OF -       GASTROSCOPY - NL EXAM       Family History   Problem Relation Age of Onset     Cancer - colorectal Mother      Arthritis Mother      Eye Disorder Mother      Thyroid Disease Mother         goiter removed     Heart Disease Father      Depression Father      Arthritis Father      Kidney Disease Father         issues later in life     Thyroid Disease Daughter      Eye Disorder Daughter         due to graves disease       Social History     Tobacco Use     Smoking status: Former Smoker     Packs/day: 0.50     Years: 51.00     Pack years: 25.50  "    Types: Cigarettes     Start date: 1963     Last attempt to quit: 2014     Years since quittin.3     Smokeless tobacco: Never Used     Tobacco comment: using E-cigs   Substance Use Topics     Alcohol use: No       MEDICATIONS:  No current facility-administered medications for this encounter.      Current Outpatient Medications   Medication Sig     ALPRAZolam (XANAX) 0.5 MG tablet Take 1 tablet (0.5 mg) by mouth 2 times daily     aspirin 81 MG tablet Take  by mouth daily.     cholecalciferol 5000 UNITS CAPS Take 1 tablet by mouth daily.     Coenzyme Q10 (COQ10 PO) Take 100 mg by mouth     Flaxseed, Linseed, (FLAXSEED OIL) 1000 MG CAPS      Krill Oil 300 MG CAPS Take 1 tablet by mouth daily Pt reports taking-500mg  daily     levothyroxine (SYNTHROID/LEVOTHROID) 88 MCG tablet Take 1 tablet (88 mcg) by mouth daily     terazosin (HYTRIN) 1 MG capsule Take 1 capsule (1 mg) by mouth At Bedtime     ranitidine (ZANTAC) 150 MG tablet TAKE 1 TABLET (150 MG) BY MOUTH 2 TIMES DAILY       ALLERGIES:     Allergies   Allergen Reactions     Hmg-Coa-R Inhibitors      \"vomiting\"     Lexapro [Escitalopram]        PERTINENT SYSTEMS REVIEW:    1. No - Do you have a history of heart attack, stroke, stent, bypass or surgery on an artery in the head, neck, heart or legs?  2. No - Do you ever have any pain or discomfort in your chest?  3. No - Do you have a history of  Heart Failure?  4. No - Are you troubled by shortness of breath when walking: On the level, up a slight hill or at night?  5. No - Do you currently have a cold, bronchitis or other respiratory infection?  6. No - Do you have a cough, shortness of breath or wheezing?  7. No - Do you sometimes get pains in the calves of your legs when you walk?  8. No - Do you or anyone in your family have previous history of blood clots?  9. No - Do you or does anyone in your family have a serious bleeding problem such as prolonged bleeding following surgeries or cuts?  10. No - " Have you ever had problems with anemia or been told to take iron pills?  11. No - Have you had any abnormal blood loss such as black, tarry or bloody stools, or abnormal vaginal bleeding?  12. No - Have you ever had a blood transfusion?  13. No - Have you or any of your relatives ever had problems with anesthesia?  14. No - Do you have sleep apnea, excessive snoring or daytime drowsiness?  15. No - Do you have any prosthetic heart valves?  16. No - Do you have prosthetic joints?    EXAMINATION:  Vitals were reviewed  Vison:  Va, See prior;  HEENT:  Cataract, otherwise unremarkable.  LUNGS:  Clear  CV:  Regular rate and rhythm without murmur  ABD:  Soft and nontender  NEURO:  Alert and nonfocal    IMPRESSION:  Patient cleared for ophthalmic surgery.  Low risk with monitored, light sedation.  I have assessed the patient's DVT risk, and no additional orders necessary.    PLAN:  Procedure(s):  Cataract Removal with Implant, Left Eye      Tyree Franz MD

## 2019-11-05 ENCOUNTER — HOSPITAL ENCOUNTER (OUTPATIENT)
Dept: ULTRASOUND IMAGING | Facility: CLINIC | Age: 74
Discharge: HOME | End: 2019-11-05
Attending: INTERNAL MEDICINE | Admitting: INTERNAL MEDICINE
Payer: COMMERCIAL

## 2019-11-05 DIAGNOSIS — K83.8 DILATED BILE DUCT: ICD-10-CM

## 2019-11-05 PROCEDURE — 76705 ECHO EXAM OF ABDOMEN: CPT

## 2019-11-05 NOTE — LETTER
November 5, 2019      Meryl Fournier  [address] [facility] [address]      Dear ,    We are writing to inform you of your test results.  The common bile duct is still dilated although less so than previous.  Because the liver tests have been normal, no further follow-up is needed for this    Resulted Orders   US Abdomen Limited    Narrative    US ABDOMEN LIMITED 11/5/2019 9:11 AM    HISTORY: Follow-up of dilated common bile duct.    TECHNIQUE: Limited abdominal ultrasound to the right upper quadrant is  performed.    COMPARISON: 5/15/2019.    FINDINGS: The liver is mildly is enlarged measuring 19 cm in  cranial-caudal dimension. It is diffusely increased in echogenicity,  similar to the prior study. No focal liver lesion is seen. No  gallstones or gallbladder wall thickening are demonstrated. The  proximal common bile duct measures up to 1 cm. It slightly tapers  before its obscured by overlying bowel gas. There is no associated  intrahepatic biliary dilatation. The pancreas is partially obscured by  overlying bowel gas. The right kidney shows no significant focal  finding.        Impression    IMPRESSION:    1. The common bile duct is stable to slightly less impressive compared  to the prior study. Correlation with bilirubin levels is suggested.  2. Fatty infiltration of the liver is again seen.      BRADFORD PERLA MD   If you have any questions or concerns, please call the clinic at the number listed above.     Sincerely,      Nora Hernandez DO/bmc

## 2019-11-05 NOTE — RESULT ENCOUNTER NOTE
The common bile duct is still dilated although less so than previous.  Because the liver tests have been normal, no further follow-up is needed for this

## 2019-11-10 ENCOUNTER — ANESTHESIA EVENT (OUTPATIENT)
Dept: SURGERY | Facility: CLINIC | Age: 74
End: 2019-11-10
Payer: COMMERCIAL

## 2019-11-10 ASSESSMENT — LIFESTYLE VARIABLES: TOBACCO_USE: 1

## 2019-11-11 NOTE — ANESTHESIA PREPROCEDURE EVALUATION
Anesthesia Pre-Procedure Evaluation    Patient: Meryl Fournier   MRN: [id number] : 1945          Preoperative Diagnosis: cataract    Procedure(s):  Cataract Removal with Implant    Past Medical History:   Diagnosis Date     Absence of menstruation [id number]     Esophageal reflux      Other anxiety states      Unspecified hypothyroidism      Past Surgical History:   Procedure Laterality Date     PHACOEMULSIFICATION WITH STANDARD INTRAOCULAR LENS IMPLANT Left 10/28/2019    Procedure: Cataract Removal with Implant;  Surgeon: Tyree Franz MD;  Location: [facility] OR     SURGICAL HISTORY OF -       TUBAL LIGATION     SURGICAL HISTORY OF -       COLONOSCOPY NL EXAM     SURGICAL HISTORY OF -       GASTROSCOPY - NL EXAM       Anesthesia Evaluation     . Pt has had prior anesthetic. Type: General and MAC    No history of anesthetic complications          ROS/MED HX    ENT/Pulmonary:     (+)tobacco use, Past use , . .    Neurologic:  - neg neurologic ROS     Cardiovascular: Comment: Coronary artery disease involving native coronary artery of native heart without angina pectoris    (+) Dyslipidemia, hypertension--CAD, --. : . . . :. . Previous cardiac testing date:results:Stress Testdate:2-9-10 results:INDICATION:  Meryl Fournier, a 64-year-old female, was here for   assessment of myocardial perfusion with history of chest pain.    Indication for exercise stress testing was chest pain.       IMPRESSIONS:   I.  Exercise Test   1.  The patient exercised to a satisfactory cardiac workload with a   good functional aerobic capacity demonstrated.   2.  The patient did not report chest pain during stress.   3.  ECG report done under separate cover.       II. Myocardial Perfusion Scintigraphy   1.  Myocardial perfusion using a single isotope technique   demonstrated a moderate sized reversible defect involving the   inferior, inferoseptal and inferolateral walls.  This finding is   significant with  significant ischemia involving the right coronary   artery distribution.   2.  Gating demonstrated normal wall motion and normal left   ventricular chamber size.   3.  The ejection fraction is normal at 71%.   4.  Results were called to Dr. Bryan, the hospitalist at [facility], who suggested transfer for cardiology consultation, and   arrangements were made for the patient to come to [facility].   5.  Compared to the previous study of N/A.       RESTING TOMOGRAPHY:  Following the injection of 11.5 mCi of   technetium-99m sestamibi, gamma camera imaging was performed using   180 degree SPECT technique.       EXERCISE TEST RESULTS:  The patient exercised for 9 minutes and 0   seconds according to the Jenaro protocol, achieving a maximum heart   rate of 132 bpm, which is 86% of the maximum predicted heart rate,   10.2 METS.  Peak blood pressure was 178/80 mmHg, yielding a   rate-pressure product of 23,000.  The test was terminated due to   fatigue.  The patient did not report chest pain during stress.       ECG report done under separate cover.       Ninety seconds prior to the termination of exercise, the patient was   injected with 35.4 mCi of technetium-99m sestamibi.  Gamma camera   imaging was performed later using 180 degree SPECT technique.       TOMOGRAPHIC RESULTS:  On the stress images, there is moderate to   severe count depression involving the basal mid and distal inferior   inferolateral walls and basal inferoseptal walls.  On resting images,   perfusion is normal.  Gated imaging demonstrates normal wall motion   and normal left ventricular chamber size.  The ejection fraction is   normal at 71%. ECG reviewed date:4-6-17 results:Sinus Bradycardia   -RSR(V1) -nondiagnostic.     PROBABLY NORMAL date: results:          METS/Exercise Tolerance:  4 - Raking leaves, gardening   Hematologic:  - neg hematologic  ROS       Musculoskeletal: Comment: Myalgia and myositis        GI/Hepatic:     (+)  "GERD       Renal/Genitourinary: Comment: CKD (chronic kidney disease) stage 4    (+) chronic renal disease, type: CRI, Nephrolithiasis ,       Endo: Comment: Impaired fasting glucose    (+) thyroid problem hypothyroidism, .      Psychiatric:     (+) psychiatric history anxiety and depression      Infectious Disease:  - neg infectious disease ROS       Malignancy:      - no malignancy   Other:    - neg other ROS                      Physical Exam  Normal systems: cardiovascular, pulmonary and dental    Airway   Mallampati: II  TM distance: >3 FB  Neck ROM: full    Dental     Cardiovascular   Rhythm and rate: regular and normal      Pulmonary    breath sounds clear to auscultation            Lab Results   Component Value Date    WBC 10.5 04/22/2019    HGB 13.3 04/22/2019    HCT 40.2 04/22/2019     04/22/2019    CRP 3.9 05/01/2018    SED 22 05/01/2018     07/18/2019    POTASSIUM 4.6 07/18/2019    CHLORIDE 109 07/18/2019    CO2 31 07/18/2019    BUN 22 07/18/2019    CR 1.53 (H) 07/18/2019     (H) 07/18/2019    TRENTON 9.2 07/18/2019    PHOS 3.6 07/18/2019    ALBUMIN 3.7 07/18/2019    PROTTOTAL 7.9 04/22/2019    ALT 31 04/22/2019    AST 21 04/22/2019    ALKPHOS 70 04/22/2019    BILITOTAL 0.5 04/22/2019    PTT 52 (H) 02/10/2010    INR 0.97 02/10/2010    TSH 3.40 02/07/2019    T4 0.97 05/01/2018       Preop Vitals  BP Readings from Last 3 Encounters:   10/28/19 (!) 154/74   08/07/19 (!) 150/82   07/22/19 147/81    Pulse Readings from Last 3 Encounters:   08/07/19 73   07/22/19 71   04/22/19 63      Resp Readings from Last 3 Encounters:   10/28/19 18   08/07/19 12   02/07/19 12    SpO2 Readings from Last 3 Encounters:   10/28/19 99%   08/07/19 95%   07/22/19 96%      Temp Readings from Last 1 Encounters:   10/28/19 36.5  C (97.7  F) (Oral)    Ht Readings from Last 1 Encounters:   10/28/19 1.664 m (5' 5.5\")      Wt Readings from Last 1 Encounters:   10/28/19 86.2 kg (190 lb)    Estimated body mass index is " "31.14 kg/m  as calculated from the following:    Height as of 10/28/19: 1.664 m (5' 5.5\").    Weight as of 10/28/19: 86.2 kg (190 lb).       Anesthesia Plan      History & Physical Review  History and physical reviewed and following examination; no interval change.    ASA Status:  3 .    NPO Status:  > 8 hours    Plan for MAC Reason for MAC:  Chronic cardiopulmonary disease (G9) and Procedure to face, neck, head or breast         Postoperative Care      Consents  Anesthetic plan, risks, benefits and alternatives discussed with:  Patient..                 Tyere Vargas, CRNA, APRN CRNA  "

## 2019-11-15 ENCOUNTER — TELEPHONE (OUTPATIENT)
Dept: INTERNAL MEDICINE | Facility: CLINIC | Age: 74
End: 2019-11-15

## 2019-11-15 DIAGNOSIS — I10 BENIGN ESSENTIAL HYPERTENSION: Primary | Chronic | ICD-10-CM

## 2019-11-15 NOTE — H&P
[facility]  T[facility]  [address]  [facility] [address]  [phone]  Dept: [phone]    OPHTHALMOLOGY PRE-OPERATIVE  HISTORY AND PHYSICAL    DATE OF H/P:  2019    DATE OF SURGERY:  2019  PROCEDURE:  Procedure(s):  Cataract Removal with Implant, Right Eye  LENS IMPLANT:  [id number] +23.0  REFRACTIVE GOAL:  PL Sph  SURGEON:  Tyree Franz MD    ANESTHESIA:  TOPICAL / MAC    OR CASE REQUIREMENTS:    DEMOGRAPHICS:  Demographic Information on Meryl Fournier:    Meryl Fournier  Gender: female  : 1945  [address]  [facility] [id number] [phone] (home)     Medical Record: [id number]  Social Security Number: [id number]  Pharmacy:    [id number] - [place], [place] - [address] [place] [address]  [id number] - [address]  Primary Care Provider: Nora Hernandez    Parent's names are: Data Unavailable (mother) and Data Unavailable (father).    Insurance: Payor: [facility] / Plan: [employer]ARE [employer] [employer] / Product Type: HMO /     OCULAR HISTORY:  Cataracts, s/p IOL left eye.    HISTORIES:  Past Medical History:   Diagnosis Date     Absence of menstruation [id number]     Esophageal reflux      Other anxiety states      Unspecified hypothyroidism        Past Surgical History:   Procedure Laterality Date     PHACOEMULSIFICATION WITH STANDARD INTRAOCULAR LENS IMPLANT Left 10/28/2019    Procedure: Cataract Removal with Implant;  Surgeon: Tyree Franz MD;  Location: [facility] OR     SURGICAL HISTORY OF -       TUBAL LIGATION     SURGICAL HISTORY OF -       COLONOSCOPY NL EXAM     SURGICAL HISTORY OF -       GASTROSCOPY - NL EXAM       Family History   Problem Relation Age of Onset     Cancer - colorectal Mother      Arthritis Mother      Eye Disorder Mother      Thyroid Disease Mother         goiter removed     Heart Disease Father      Depression Father      Arthritis Father      Kidney Disease Father   "       issues later in life     Thyroid Disease Daughter      Eye Disorder Daughter         due to graves disease       Social History     Tobacco Use     Smoking status: Former Smoker     Packs/day: 0.50     Years: 51.00     Pack years: 25.50     Types: Cigarettes     Start date: 1963     Last attempt to quit: 2014     Years since quittin.3     Smokeless tobacco: Never Used     Tobacco comment: using E-cigs   Substance Use Topics     Alcohol use: No       MEDICATIONS:  No current facility-administered medications for this encounter.      Current Outpatient Medications   Medication Sig     ALPRAZolam (XANAX) 0.5 MG tablet Take 1 tablet (0.5 mg) by mouth 2 times daily     aspirin 81 MG tablet Take  by mouth daily.     cholecalciferol 5000 UNITS CAPS Take 1 tablet by mouth daily.     Coenzyme Q10 (COQ10 PO) Take 100 mg by mouth     Flaxseed, Linseed, (FLAXSEED OIL) 1000 MG CAPS      Krill Oil 300 MG CAPS Take 1 tablet by mouth daily Pt reports taking-500mg  daily     levothyroxine (SYNTHROID/LEVOTHROID) 88 MCG tablet Take 1 tablet (88 mcg) by mouth daily     ranitidine (ZANTAC) 150 MG tablet TAKE 1 TABLET (150 MG) BY MOUTH 2 TIMES DAILY     terazosin (HYTRIN) 1 MG capsule Take 1 capsule (1 mg) by mouth At Bedtime       ALLERGIES:     Allergies   Allergen Reactions     Hmg-Coa-R Inhibitors      \"vomiting\"     Lexapro [Escitalopram]        PERTINENT SYSTEMS REVIEW:    1. No - Do you have a history of heart attack, stroke, stent, bypass or surgery on an artery in the head, neck, heart or legs?  2. No - Do you ever have any pain or discomfort in your chest?  3. No - Do you have a history of  Heart Failure?  4. No - Are you troubled by shortness of breath when walking: On the level, up a slight hill or at night?  5. No - Do you currently have a cold, bronchitis or other respiratory infection?  6. No - Do you have a cough, shortness of breath or wheezing?  7. No - Do you sometimes get pains in the calves of your " legs when you walk?  8. No - Do you or anyone in your family have previous history of blood clots?  9. No - Do you or does anyone in your family have a serious bleeding problem such as prolonged bleeding following surgeries or cuts?  10. No - Have you ever had problems with anemia or been told to take iron pills?  11. No - Have you had any abnormal blood loss such as black, tarry or bloody stools, or abnormal vaginal bleeding?  12. No - Have you ever had a blood transfusion?  13. No - Have you or any of your relatives ever had problems with anesthesia?  14. No - Do you have sleep apnea, excessive snoring or daytime drowsiness?  15. No - Do you have any prosthetic heart valves?  16. No - Do you have prosthetic joints?    EXAMINATION:  Vitals were reviewed                     Vison:  Va, right - 20/40;   BAT, right - 20/80;  HEENT:  Cataract, otherwise unremarkable.  LUNGS:  Clear  CV:  Regular rate and rhythm without murmur  ABD:  Soft and nontender  NEURO:  Alert and nonfocal    IMPRESSION:  Patient cleared for ophthalmic surgery.  Low risk with monitored, light sedation.  I have assessed the patient's DVT risk, and no additional orders necessary.    PLAN:  Procedure(s):  Cataract Removal with Implant, Right Eye      Tyree Franz MD

## 2019-11-15 NOTE — TELEPHONE ENCOUNTER
Reason for Call:  Other prescription    Detailed comments: Pt is calling re her medications of the High Blood pressure and Rantidine, (recalled) . Pt will be having cataract surgery on 11/18/19.     Phone Number Patient can be reached at: Home number on file [phone] (home)    Best Time: any    Can we leave a detailed message on this number? YES    Call taken on 11/15/2019 at 4:22 PM by Carmen Vilchis

## 2019-11-18 ENCOUNTER — ANESTHESIA (OUTPATIENT)
Dept: SURGERY | Facility: CLINIC | Age: 74
End: 2019-11-18
Payer: COMMERCIAL

## 2019-11-18 ENCOUNTER — HOSPITAL ENCOUNTER (OUTPATIENT)
Facility: CLINIC | Age: 74
Discharge: HOME | End: 2019-11-18
Attending: OPHTHALMOLOGY | Admitting: OPHTHALMOLOGY
Payer: COMMERCIAL

## 2019-11-18 VITALS
HEART RATE: 61 BPM | TEMPERATURE: 97.5 F | DIASTOLIC BLOOD PRESSURE: 65 MMHG | OXYGEN SATURATION: 98 % | BODY MASS INDEX: 31.65 KG/M2 | RESPIRATION RATE: 15 BRPM | HEIGHT: 65 IN | SYSTOLIC BLOOD PRESSURE: 133 MMHG | WEIGHT: 190 LBS

## 2019-11-18 PROCEDURE — 25000125 ZZHC RX 250: Performed by: OPHTHALMOLOGY

## 2019-11-18 PROCEDURE — V2632 POST CHMBR INTRAOCULAR LENS: HCPCS | Performed by: OPHTHALMOLOGY

## 2019-11-18 PROCEDURE — 25000125 ZZHC RX 250: Performed by: NURSE ANESTHETIST, CERTIFIED REGISTERED

## 2019-11-18 PROCEDURE — 25000128 H RX IP 250 OP 636: Performed by: NURSE ANESTHETIST, CERTIFIED REGISTERED

## 2019-11-18 PROCEDURE — 25800030 ZZH RX IP 258 OP 636: Performed by: NURSE ANESTHETIST, CERTIFIED REGISTERED

## 2019-11-18 PROCEDURE — 37000012 ZZH ANESTHESIA CATARACT PACKAGE: Performed by: OPHTHALMOLOGY

## 2019-11-18 PROCEDURE — 71000022 ZZH RECOVERY CATRACT PACKAGE: Performed by: OPHTHALMOLOGY

## 2019-11-18 PROCEDURE — 36000025 ZZH CATARACT SURGICAL PACKAGE: Performed by: OPHTHALMOLOGY

## 2019-11-18 PROCEDURE — 25000128 H RX IP 250 OP 636: Performed by: OPHTHALMOLOGY

## 2019-11-18 DEVICE — EYE IMP IOL AMO PCL TECNIS ZCB00 23.0: Type: IMPLANTABLE DEVICE | Site: EYE | Status: FUNCTIONAL

## 2019-11-18 RX ORDER — HYDROMORPHONE HYDROCHLORIDE 1 MG/ML
.3-.5 INJECTION, SOLUTION INTRAMUSCULAR; INTRAVENOUS; SUBCUTANEOUS EVERY 10 MIN PRN
Status: CANCELLED | OUTPATIENT
Start: 2019-11-18

## 2019-11-18 RX ORDER — TROPICAMIDE 10 MG/ML
1 SOLUTION/ DROPS OPHTHALMIC
Status: COMPLETED | OUTPATIENT
Start: 2019-11-18 | End: 2019-11-18

## 2019-11-18 RX ORDER — NALOXONE HYDROCHLORIDE 0.4 MG/ML
.1-.4 INJECTION, SOLUTION INTRAMUSCULAR; INTRAVENOUS; SUBCUTANEOUS
Status: DISCONTINUED | OUTPATIENT
Start: 2019-11-18 | End: 2019-11-18 | Stop reason: HOSPADM

## 2019-11-18 RX ORDER — LISINOPRIL AND HYDROCHLOROTHIAZIDE 12.5; 2 MG/1; MG/1
1 TABLET ORAL DAILY
Qty: 90 TABLET | Refills: 3 | Status: SHIPPED | OUTPATIENT
Start: 2019-11-18 | End: 2020-10-13

## 2019-11-18 RX ORDER — ONDANSETRON 4 MG/1
4 TABLET, ORALLY DISINTEGRATING ORAL EVERY 30 MIN PRN
Status: DISCONTINUED | OUTPATIENT
Start: 2019-11-18 | End: 2019-11-18 | Stop reason: HOSPADM

## 2019-11-18 RX ORDER — SODIUM CHLORIDE, SODIUM LACTATE, POTASSIUM CHLORIDE, CALCIUM CHLORIDE 600; 310; 30; 20 MG/100ML; MG/100ML; MG/100ML; MG/100ML
INJECTION, SOLUTION INTRAVENOUS CONTINUOUS
Status: DISCONTINUED | OUTPATIENT
Start: 2019-11-18 | End: 2019-11-18 | Stop reason: HOSPADM

## 2019-11-18 RX ORDER — SODIUM CHLORIDE 9 MG/ML
INJECTION, SOLUTION INTRAVENOUS CONTINUOUS
Status: DISCONTINUED | OUTPATIENT
Start: 2019-11-18 | End: 2019-11-18 | Stop reason: HOSPADM

## 2019-11-18 RX ORDER — PROPARACAINE HYDROCHLORIDE 5 MG/ML
SOLUTION/ DROPS OPHTHALMIC PRN
Status: DISCONTINUED | OUTPATIENT
Start: 2019-11-18 | End: 2019-11-18 | Stop reason: HOSPADM

## 2019-11-18 RX ORDER — BALANCED SALT SOLUTION 6.4; .75; .48; .3; 3.9; 1.7 MG/ML; MG/ML; MG/ML; MG/ML; MG/ML; MG/ML
SOLUTION OPHTHALMIC PRN
Status: DISCONTINUED | OUTPATIENT
Start: 2019-11-18 | End: 2019-11-18 | Stop reason: HOSPADM

## 2019-11-18 RX ORDER — FENTANYL CITRATE 50 UG/ML
25-50 INJECTION, SOLUTION INTRAMUSCULAR; INTRAVENOUS
Status: CANCELLED | OUTPATIENT
Start: 2019-11-18

## 2019-11-18 RX ORDER — ALBUTEROL SULFATE 0.83 MG/ML
2.5 SOLUTION RESPIRATORY (INHALATION) EVERY 4 HOURS PRN
Status: DISCONTINUED | OUTPATIENT
Start: 2019-11-18 | End: 2019-11-18 | Stop reason: HOSPADM

## 2019-11-18 RX ORDER — PHENYLEPHRINE HYDROCHLORIDE 25 MG/ML
1 SOLUTION/ DROPS OPHTHALMIC
Status: COMPLETED | OUTPATIENT
Start: 2019-11-18 | End: 2019-11-18

## 2019-11-18 RX ORDER — SODIUM CHLORIDE, SODIUM LACTATE, POTASSIUM CHLORIDE, CALCIUM CHLORIDE 600; 310; 30; 20 MG/100ML; MG/100ML; MG/100ML; MG/100ML
INJECTION, SOLUTION INTRAVENOUS CONTINUOUS
Status: CANCELLED | OUTPATIENT
Start: 2019-11-18

## 2019-11-18 RX ORDER — ONDANSETRON 4 MG/1
4 TABLET, ORALLY DISINTEGRATING ORAL EVERY 30 MIN PRN
Status: CANCELLED | OUTPATIENT
Start: 2019-11-18

## 2019-11-18 RX ORDER — ONDANSETRON 2 MG/ML
4 INJECTION INTRAMUSCULAR; INTRAVENOUS EVERY 30 MIN PRN
Status: CANCELLED | OUTPATIENT
Start: 2019-11-18

## 2019-11-18 RX ORDER — CYCLOPENTOLATE HYDROCHLORIDE 10 MG/ML
1 SOLUTION/ DROPS OPHTHALMIC
Status: COMPLETED | OUTPATIENT
Start: 2019-11-18 | End: 2019-11-18

## 2019-11-18 RX ORDER — LIDOCAINE 40 MG/G
CREAM TOPICAL
Status: DISCONTINUED | OUTPATIENT
Start: 2019-11-18 | End: 2019-11-18 | Stop reason: HOSPADM

## 2019-11-18 RX ORDER — MEPERIDINE HYDROCHLORIDE 25 MG/ML
12.5 INJECTION INTRAMUSCULAR; INTRAVENOUS; SUBCUTANEOUS
Status: CANCELLED | OUTPATIENT
Start: 2019-11-18

## 2019-11-18 RX ORDER — ONDANSETRON 2 MG/ML
4 INJECTION INTRAMUSCULAR; INTRAVENOUS EVERY 30 MIN PRN
Status: DISCONTINUED | OUTPATIENT
Start: 2019-11-18 | End: 2019-11-18 | Stop reason: HOSPADM

## 2019-11-18 RX ORDER — DEXAMETHASONE SODIUM PHOSPHATE 4 MG/ML
4 INJECTION, SOLUTION INTRA-ARTICULAR; INTRALESIONAL; INTRAMUSCULAR; INTRAVENOUS; SOFT TISSUE EVERY 10 MIN PRN
Status: DISCONTINUED | OUTPATIENT
Start: 2019-11-18 | End: 2019-11-18 | Stop reason: HOSPADM

## 2019-11-18 RX ORDER — NALOXONE HYDROCHLORIDE 0.4 MG/ML
.1-.4 INJECTION, SOLUTION INTRAMUSCULAR; INTRAVENOUS; SUBCUTANEOUS
Status: CANCELLED | OUTPATIENT
Start: 2019-11-18 | End: 2019-11-19

## 2019-11-18 RX ORDER — LIDOCAINE HYDROCHLORIDE 20 MG/ML
JELLY TOPICAL PRN
Status: DISCONTINUED | OUTPATIENT
Start: 2019-11-18 | End: 2019-11-18 | Stop reason: HOSPADM

## 2019-11-18 RX ADMIN — CYCLOPENTOLATE HYDROCHLORIDE 1 DROP: 10 SOLUTION/ DROPS OPHTHALMIC at 08:01

## 2019-11-18 RX ADMIN — TROPICAMIDE 1 DROP: 10 SOLUTION/ DROPS OPHTHALMIC at 08:16

## 2019-11-18 RX ADMIN — PHENYLEPHRINE HYDROCHLORIDE 1 DROP: 25 SOLUTION/ DROPS OPHTHALMIC at 08:06

## 2019-11-18 RX ADMIN — SODIUM CHLORIDE, POTASSIUM CHLORIDE, SODIUM LACTATE AND CALCIUM CHLORIDE: 600; 310; 30; 20 INJECTION, SOLUTION INTRAVENOUS at 08:13

## 2019-11-18 RX ADMIN — CYCLOPENTOLATE HYDROCHLORIDE 1 DROP: 10 SOLUTION/ DROPS OPHTHALMIC at 08:16

## 2019-11-18 RX ADMIN — PHENYLEPHRINE HYDROCHLORIDE 1 DROP: 25 SOLUTION/ DROPS OPHTHALMIC at 08:00

## 2019-11-18 RX ADMIN — LIDOCAINE HYDROCHLORIDE 0.1 ML: 10 INJECTION, SOLUTION EPIDURAL; INFILTRATION; INTRACAUDAL; PERINEURAL at 08:13

## 2019-11-18 RX ADMIN — TROPICAMIDE 1 DROP: 10 SOLUTION/ DROPS OPHTHALMIC at 08:05

## 2019-11-18 RX ADMIN — MIDAZOLAM 2 MG: 1 INJECTION INTRAMUSCULAR; INTRAVENOUS at 09:30

## 2019-11-18 RX ADMIN — TROPICAMIDE 1 DROP: 10 SOLUTION/ DROPS OPHTHALMIC at 08:01

## 2019-11-18 RX ADMIN — CYCLOPENTOLATE HYDROCHLORIDE 1 DROP: 10 SOLUTION/ DROPS OPHTHALMIC at 08:07

## 2019-11-18 RX ADMIN — PHENYLEPHRINE HYDROCHLORIDE 1 DROP: 25 SOLUTION/ DROPS OPHTHALMIC at 08:15

## 2019-11-18 ASSESSMENT — MIFFLIN-ST. JEOR: SCORE: 1352.71

## 2019-11-18 NOTE — TELEPHONE ENCOUNTER
Dr Hernandez is not in clinic today.  If her lot of ranitidine was recalled, she can switch to famotidine - may buy over-the-counter.    Which BP medication is she talking about? I am unaware of any medication recalls regarding terazosin  Poly Cardona, CNP

## 2019-11-18 NOTE — ANESTHESIA POSTPROCEDURE EVALUATION
Patient: Meryl Founrier    Procedure(s):  Cataract Removal with Implant    Diagnosis:cataract  Diagnosis Additional Information: No value filed.    Anesthesia Type:  MAC    Note:  Anesthesia Post Evaluation    Patient location during evaluation: Phase 2 and Bedside  Patient participation: Able to fully participate in evaluation  Level of consciousness: awake and alert  Pain management: adequate  Airway patency: patent  Cardiovascular status: acceptable  Respiratory status: acceptable  Hydration status: acceptable  PONV: none     Anesthetic complications: None          Last vitals:  Vitals:    11/18/19 0747 11/18/19 0759   BP:  119/70   Resp: 18    Temp: 36.4  C (97.5  F)    SpO2: 98%          Electronically Signed By: Jeremy Porter CRNA, APRN CRNA  November 18, 2019  10:19 AM

## 2019-11-18 NOTE — TELEPHONE ENCOUNTER
Patient reports:  She went back on lisinopril-hydrochlorothiazide (PRINZIDE/ZESTORETIC) 20-12.5 MG tablet, after trying Terazosin and it not working for her BP.    She is requesting a new prescription for Lisinopril/hctz.    Routing to provider.    Symone CHERY Rn

## 2019-11-18 NOTE — ANESTHESIA CARE TRANSFER NOTE
Patient: Meryl Fournier    Procedure(s):  Cataract Removal with Implant    Diagnosis: cataract  Diagnosis Additional Information: No value filed.    Anesthesia Type:   MAC     Note:  Airway :Room Air  Patient transferred to:Phase II  Handoff Report: Identifed the Patient, Identified the Reponsible Provider, Reviewed the pertinent medical history, Discussed the surgical course, Reviewed Intra-OP anesthesia mangement and issues during anesthesia, Set expectations for post-procedure period and Allowed opportunity for questions and acknowledgement of understanding      Vitals: (Last set prior to Anesthesia Care Transfer)    CRNA VITALS  11/18/2019 0922 - 11/18/2019 0952      11/18/2019             NIBP:  127/52    Pulse:  60    NIBP Mean:  77    SpO2:  96 %                Electronically Signed By: Jeremy Porter CRNA, APRN CRNA  November 18, 2019  9:52 AM

## 2019-11-18 NOTE — OP NOTE
OPHTHALMOLOGY OPERATIVE NOTE    PATIENT: Meryl Fournier  DATE OF SURGERY: 11/18/2019  PREOPERATIVE DIAGNOSIS:  Senile Nuclear Cataract, Right eye  POSTOPERATIVE DIAGNOSIS:  Senile Nuclear Cataract, Right eye  OPERATIVE PROCEDURE:  Phacoemulsification with placement of intraocular lens  SURGEON:  Tyree Franz MD  ANESTHESIA:  Topical / MAC  EBL:  None  SPECIMENS:  None  COMPLICATIONS:  None    PROCEDURE:  The patient was brought to the operating room at [facility].  The right eye was prepped and draped in the usual fashion for cataract surgery.  A wire lid speculum was inserted.  A super sharp blade was used to make a paracentesis at the 11 O'clock position.  The super sharp blade was used to make a partial thickness temporal groove, which was 3 mm in length.  0.8 mL of non-preserved epi-Shugarcaine was injected into the anterior chamber.  Viscoelastic was used to inflate the anterior chamber through a cannula.  A 2.5 mm microkeratome was used to make a temporal clear corneal incision in a two-plane fashion.  A cystotome needle and forceps were used to make a capsulorrhexis.  Hydrodissection and hydrodelineation were performed with Balance Salt Solution.  The lens was then phacoemulsified and removed without complications.  The cortical material was removed with bimanual irrigation and aspiration.  The capsular bag was filled with viscoelastic.  A posterior chamber intraocular lens, preselected and recorded, was folded and inserted into the capsular bag.  The viscoelastic was removed with the irrigation and aspiration tip.  Balanced Salt Solution with Vigamox, 150mg/0.1mL, was used to refill the anterior chamber.  The wounds were checked for water tightness and required no suture.  The wire lid speculum was removed.  The patient's right eye was cleaned and a drop of each post-operative drop was placed, followed by a morgan shield.  The patient tolerated the procedure well, and there  were no complications.      Tyree Franz MD

## 2019-11-19 ENCOUNTER — TELEPHONE (OUTPATIENT)
Dept: INTERNAL MEDICINE | Facility: CLINIC | Age: 74
End: 2019-11-19

## 2019-11-19 DIAGNOSIS — K21.9 GASTROESOPHAGEAL REFLUX DISEASE WITHOUT ESOPHAGITIS: Primary | ICD-10-CM

## 2019-11-19 RX ORDER — FAMOTIDINE 40 MG/1
40 TABLET, FILM COATED ORAL AT BEDTIME
Qty: 90 TABLET | Refills: 3 | Status: SHIPPED | OUTPATIENT
Start: 2019-11-19 | End: 2020-08-26

## 2019-11-19 NOTE — TELEPHONE ENCOUNTER
Order placed for famotidine 40 mg once daily.  If this is unavailable, then cimetidine 400 mg once daily

## 2019-12-10 ENCOUNTER — TELEPHONE (OUTPATIENT)
Dept: INTERNAL MEDICINE | Facility: CLINIC | Age: 74
End: 2019-12-10

## 2019-12-10 ENCOUNTER — ALLIED HEALTH/NURSE VISIT (OUTPATIENT)
Dept: FAMILY MEDICINE | Facility: CLINIC | Age: 74
End: 2019-12-10
Payer: COMMERCIAL

## 2019-12-10 VITALS — DIASTOLIC BLOOD PRESSURE: 74 MMHG | HEART RATE: 64 BPM | SYSTOLIC BLOOD PRESSURE: 142 MMHG

## 2019-12-10 DIAGNOSIS — I10 BENIGN ESSENTIAL HYPERTENSION: Chronic | ICD-10-CM

## 2019-12-10 DIAGNOSIS — I10 BENIGN ESSENTIAL HYPERTENSION: Primary | ICD-10-CM

## 2019-12-10 PROCEDURE — 99207 ZZC NO CHARGE NURSE ONLY: CPT

## 2019-12-10 NOTE — NURSING NOTE
S-(situation): RN blood pressure recheck.    B-(background): Last office visit with provider 8/7/19/  Pt says she plans to return in January for her annual visit with Dr Hernandez as well.    A-(assessment): Meryl Fournier is a 74 year old year old patient who comes in today for a Blood Pressure check because of medication change and ongoing blood pressure monitoring.  Nephrologist had put pt on terazosin but pt experienced ankle edema.  She stopped this one month ago and Dr Hernandez advised pt return to lisinopril/hctz.  Pt was experiencing feeling very tired on lisinopril/hctz so she started taking it at bedtime.  She is feeling MUCH better with bedtime dose.      Vital Signs as repeated by RN:  158/82, pulse of 72  142/74, pulse of 74, 5 min later.    Patient is taking medication as prescribed  Patient is tolerating medications well.  Patient is monitoring Blood Pressure at home.  Average readings are 130's/mid to upper 70's.  Current complaints: none.    R-(recommendations): Routed updated blood pressure readings to provider.  Pt has been on current regimen for 3-4 weeks.    Suzanne Miller RN      BP Readings from Last 6 Encounters:   12/10/19 (!) 142/74   11/18/19 133/65   10/28/19 (!) 154/74   08/07/19 (!) 150/82   07/22/19 147/81   04/22/19 154/70     Lab Results   Component Value Date    CR 1.53 07/18/2019     Lab Results   Component Value Date    POTASSIUM 4.6 07/18/2019

## 2019-12-10 NOTE — TELEPHONE ENCOUNTER
Average reading at home is better than in the clinic.  Continue medications without change for now, we will discuss more at upcoming visit in January.  Bring home blood pressure log.  If blood pressure continues to run higher, may consider doubling the dose of the lisinopril versus adding a new medication

## 2019-12-23 DIAGNOSIS — N18.30 CKD (CHRONIC KIDNEY DISEASE) STAGE 3, GFR 30-59 ML/MIN (H): Primary | ICD-10-CM

## 2020-01-15 ENCOUNTER — TELEPHONE (OUTPATIENT)
Dept: FAMILY MEDICINE | Facility: CLINIC | Age: 75
End: 2020-01-15

## 2020-01-15 DIAGNOSIS — E03.9 ACQUIRED HYPOTHYROIDISM: ICD-10-CM

## 2020-01-15 NOTE — TELEPHONE ENCOUNTER
"Requested Prescriptions   Pending Prescriptions Disp Refills     levothyroxine (SYNTHROID/LEVOTHROID) 88 MCG tablet [Pharmacy Med Name: LEVOTHYROXINE 88MCG TABLET] 90 tablet 3     Sig: TAKE 1 TABLET (88 MCG) BY MOUTH DAILY       Thyroid Protocol Passed - 1/15/2020  1:00 AM        Passed - Patient is 12 years or older        Passed - Recent (12 mo) or future (30 days) visit within the authorizing provider's specialty     Patient has had an office visit with the authorizing provider or a provider within the authorizing providers department within the previous 12 mos or has a future within next 30 days. See \"Patient Info\" tab in inbasket, or \"Choose Columns\" in Meds & Orders section of the refill encounter.              Passed - Medication is active on med list        Passed - Normal TSH on file in past 12 months     Recent Labs   Lab Test 02/07/19  1032   TSH 3.40              Passed - No active pregnancy on record     If patient is pregnant or has had a positive pregnancy test, please check TSH.          Passed - No positive pregnancy test in past 12 months     If patient is pregnant or has had a positive pregnancy test, please check TSH.          Last Written Prescription Date:  2/7/2019  Last Fill Quantity: 90,  # refills: 3   Last office visit: 8/7/2019 with prescribing provider:  Beka    Future Office Visit:   Next 5 appointments (look out 90 days)    Jan 20, 2020 11:30 AM CST  Return Visit with Ra Betancur MD  [facility] ([facility] [address] [place] [address] [phone]           "

## 2020-01-16 ENCOUNTER — OFFICE VISIT (OUTPATIENT)
Dept: FAMILY MEDICINE | Facility: CLINIC | Age: 75
End: 2020-01-16
Payer: COMMERCIAL

## 2020-01-16 VITALS
DIASTOLIC BLOOD PRESSURE: 62 MMHG | HEART RATE: 66 BPM | WEIGHT: 188 LBS | BODY MASS INDEX: 32.1 KG/M2 | HEIGHT: 64 IN | SYSTOLIC BLOOD PRESSURE: 136 MMHG | TEMPERATURE: 96.2 F | OXYGEN SATURATION: 99 % | RESPIRATION RATE: 12 BRPM

## 2020-01-16 DIAGNOSIS — R82.90 MALODOROUS URINE: Primary | ICD-10-CM

## 2020-01-16 DIAGNOSIS — N20.0 CALCULUS OF KIDNEY: ICD-10-CM

## 2020-01-16 DIAGNOSIS — Z23 NEED FOR PROPHYLACTIC VACCINATION AND INOCULATION AGAINST INFLUENZA: ICD-10-CM

## 2020-01-16 DIAGNOSIS — N25.81 SECONDARY RENAL HYPERPARATHYROIDISM (H): ICD-10-CM

## 2020-01-16 DIAGNOSIS — E03.9 ACQUIRED HYPOTHYROIDISM: ICD-10-CM

## 2020-01-16 DIAGNOSIS — I10 BENIGN ESSENTIAL HYPERTENSION: Chronic | ICD-10-CM

## 2020-01-16 DIAGNOSIS — N18.4 CKD (CHRONIC KIDNEY DISEASE) STAGE 4, GFR 15-29 ML/MIN (H): ICD-10-CM

## 2020-01-16 LAB
ALBUMIN UR-MCNC: NEGATIVE MG/DL
ANION GAP SERPL CALCULATED.3IONS-SCNC: 3 MMOL/L (ref 3–14)
APPEARANCE UR: CLEAR
BILIRUB UR QL STRIP: NEGATIVE
BUN SERPL-MCNC: 23 MG/DL (ref 7–30)
CALCIUM SERPL-MCNC: 9.2 MG/DL (ref 8.5–10.1)
CHLORIDE SERPL-SCNC: 102 MMOL/L (ref 94–109)
CO2 SERPL-SCNC: 29 MMOL/L (ref 20–32)
COLOR UR AUTO: YELLOW
CREAT SERPL-MCNC: 1.55 MG/DL (ref 0.52–1.04)
CREAT UR-MCNC: 59 MG/DL
GFR SERPL CREATININE-BSD FRML MDRD: 32 ML/MIN/{1.73_M2}
GLUCOSE SERPL-MCNC: 93 MG/DL (ref 70–99)
GLUCOSE UR STRIP-MCNC: NEGATIVE MG/DL
HGB BLD-MCNC: 13 G/DL (ref 11.7–15.7)
HGB UR QL STRIP: NEGATIVE
KETONES UR STRIP-MCNC: NEGATIVE MG/DL
LEUKOCYTE ESTERASE UR QL STRIP: NEGATIVE
MICROALBUMIN UR-MCNC: 12 MG/L
MICROALBUMIN/CREAT UR: 19.62 MG/G CR (ref 0–25)
NITRATE UR QL: NEGATIVE
PH UR STRIP: 6.5 PH (ref 5–7)
POTASSIUM SERPL-SCNC: 4.4 MMOL/L (ref 3.4–5.3)
SODIUM SERPL-SCNC: 134 MMOL/L (ref 133–144)
SOURCE: NORMAL
SP GR UR STRIP: <=1.005 (ref 1–1.03)
TSH SERPL DL<=0.005 MIU/L-ACNC: 3.26 MU/L (ref 0.4–4)
UROBILINOGEN UR STRIP-ACNC: 0.2 EU/DL (ref 0.2–1)

## 2020-01-16 PROCEDURE — 85018 HEMOGLOBIN: CPT | Performed by: INTERNAL MEDICINE

## 2020-01-16 PROCEDURE — 82043 UR ALBUMIN QUANTITATIVE: CPT | Performed by: INTERNAL MEDICINE

## 2020-01-16 PROCEDURE — 80048 BASIC METABOLIC PNL TOTAL CA: CPT | Performed by: INTERNAL MEDICINE

## 2020-01-16 PROCEDURE — 99214 OFFICE O/P EST MOD 30 MIN: CPT | Mod: 25 | Performed by: INTERNAL MEDICINE

## 2020-01-16 PROCEDURE — 81003 URINALYSIS AUTO W/O SCOPE: CPT | Performed by: INTERNAL MEDICINE

## 2020-01-16 PROCEDURE — 84443 ASSAY THYROID STIM HORMONE: CPT | Performed by: INTERNAL MEDICINE

## 2020-01-16 PROCEDURE — 36415 COLL VENOUS BLD VENIPUNCTURE: CPT | Performed by: INTERNAL MEDICINE

## 2020-01-16 PROCEDURE — 90686 IIV4 VACC NO PRSV 0.5 ML IM: CPT | Performed by: INTERNAL MEDICINE

## 2020-01-16 PROCEDURE — G0008 ADMIN INFLUENZA VIRUS VAC: HCPCS | Performed by: INTERNAL MEDICINE

## 2020-01-16 RX ORDER — LEVOTHYROXINE SODIUM 88 UG/1
88 TABLET ORAL DAILY
Qty: 90 TABLET | Refills: 3 | Status: SHIPPED | OUTPATIENT
Start: 2020-01-16 | End: 2021-01-14

## 2020-01-16 ASSESSMENT — MIFFLIN-ST. JEOR: SCORE: 1330.51

## 2020-01-16 NOTE — LETTER
[facility]  [address]  [facility] [address]  Phone: [phone]      1/17/2020     Meryl Fournier  [address] [facility] [address]      Dear Meryl:    Thank you for allowing me to participate in your care. Your recent test results were reviewed and listed below.      Thyroid, electrolytes, hemoglobin, urine protein test is normal.   The kidney function is mildly reduced, similar to previous values seen over the last 2 years.     Results for orders placed or performed in visit on 01/16/20   **UA reflex to Microscopic FUTURE anytime     Status: None   Result Value Ref Range    Color Urine Yellow     Appearance Urine Clear     Glucose Urine Negative NEG^Negative mg/dL    Bilirubin Urine Negative NEG^Negative    Ketones Urine Negative NEG^Negative mg/dL    Specific Gravity Urine <=1.005 1.003 - 1.035    Blood Urine Negative NEG^Negative    pH Urine 6.5 5.0 - 7.0 pH    Protein Albumin Urine Negative NEG^Negative mg/dL    Urobilinogen Urine 0.2 0.2 - 1.0 EU/dL    Nitrite Urine Negative NEG^Negative    Leukocyte Esterase Urine Negative NEG^Negative    Source Midstream Urine    BASIC METABOLIC PANEL     Status: Abnormal   Result Value Ref Range    Sodium 134 133 - 144 mmol/L    Potassium 4.4 3.4 - 5.3 mmol/L    Chloride 102 94 - 109 mmol/L    Carbon Dioxide 29 20 - 32 mmol/L    Anion Gap 3 3 - 14 mmol/L    Glucose 93 70 - 99 mg/dL    Urea Nitrogen 23 7 - 30 mg/dL    Creatinine 1.55 (H) 0.52 - 1.04 mg/dL    GFR Estimate 32 (L) >60 mL/min/[1.73_m2]    GFR Estimate If Black 37 (L) >60 mL/min/[1.73_m2]    Calcium 9.2 8.5 - 10.1 mg/dL   Hemoglobin     Status: None   Result Value Ref Range    Hemoglobin 13.0 11.7 - 15.7 g/dL   Albumin Random Urine Quantitative with Creat Ratio     Status: None   Result Value Ref Range    Creatinine Urine 59 mg/dL    Albumin Urine mg/L 12 mg/L    Albumin Urine mg/g Cr 19.62 0 - 25 mg/g Cr   TSH WITH FREE T4 REFLEX     Status: None   Result Value Ref Range     TSH 3.26 0.40 - 4.00 mU/L         Thank you for choosing [facility]. As a result, please continue with the treatment plan discussed in the office. Return as discussed or sooner if symptoms worsen or fail to improve.     If you have any further questions or concerns, please do not hesitate to contact us.      Sincerely,    DR Hernandez

## 2020-01-16 NOTE — TELEPHONE ENCOUNTER
Routing refill request to provider for review/approval because:  Has appt scheduled for today 1/16/2020  Needs TSH (due Feb 2020)    Tatiana CASILLAS RN

## 2020-01-16 NOTE — PROGRESS NOTES
"Subjective     Meryl Fournier is a 74 year old female who presents to clinic today for the following health issues:    HPI   Hyperlipidemia Follow-Up      Are you regularly taking any medication or supplement to lower your cholesterol?   No    Are you having muscle aches or other side effects that you think could be caused by your cholesterol lowering medication?  No    Hypertension Follow-up    Going up and down - huge difference btw readings - sometimes after 10 minutes 30 points      Do you check your blood pressure regularly outside of the clinic? Yes     Are you following a low salt diet? Yes    Are your blood pressures ever more than 140 on the top number (systolic) OR more than 90 on the bottom number (diastolic), for example 140/90? Yes     Cuff is a few years old - arm, not wrist.     She forgot her home blood pressure log.  Most are 130-140s./60s    She has had side effects to many medications.  She would prefer not to increase her blood pressure medications at this time.    Per RN blood pressure check 12/10/19 - \"Nephrologist had put pt on terazosin but pt experienced ankle edema.  She stopped this one month ago and Dr Hernandez advised pt return to lisinopril/hctz. Pt was experiencing feeling very tired on lisinopril/hctz so she started taking it at bedtime.  She is feeling MUCH better with bedtime dose. \"    Hypothyroidism Follow-up      Since last visit, patient describes the following symptoms: dry skin, constipation, loose stools, fatigue and hair loss      URINARY TRACT SYMPTOMS  Onset: a week or so    Description:   Painful urination (Dysuria): YES           Frequency: no   Blood in urine (Hematuria): no   Delay in urine (Hesitency): no     Intensity: moderate    Progression of Symptoms:  intermittent    Accompanying Signs & Symptoms:  Fever/chills: no   Flank pain YES  Nausea and vomiting: YES- Nausea  Any vaginal symptoms: none  Abdominal/Pelvic Pain: YES- both    History:   History of frequent " "UTI's: YES  History of kidney stones: YES  Sexually Active: no   Possibility of pregnancy: No    Precipitating factors: na    Therapies Tried and outcome: na    Symptoms are dark urine, malodor to urine, acute on chronic back pain. Symptoms resolved today.    Last night developed lower abdominal cramping along with left flank pain. No dysuria or frequency.            Current Outpatient Medications   Medication Sig Dispense Refill     ALPRAZolam (XANAX) 0.5 MG tablet Take 1 tablet (0.5 mg) by mouth 2 times daily 60 tablet 5     aspirin 81 MG tablet Take  by mouth daily.  3     cholecalciferol 5000 UNITS CAPS Take 1 tablet by mouth daily. 30 capsule      Coenzyme Q10 (COQ10 PO) Take 100 mg by mouth       famotidine (PEPCID) 40 MG tablet Take 1 tablet (40 mg) by mouth At Bedtime 90 tablet 3     Krill Oil 300 MG CAPS Take 1 tablet by mouth daily Pt reports taking-500mg  daily       levothyroxine (SYNTHROID/LEVOTHROID) 88 MCG tablet TAKE 1 TABLET (88 MCG) BY MOUTH DAILY 90 tablet 3     lisinopril-hydrochlorothiazide (PRINZIDE/ZESTORETIC) 20-12.5 MG tablet Take 1 tablet by mouth daily 90 tablet 3         Reviewed and updated as needed this visit by Provider         Review of Systems   ROS COMP: Constitutional, HEENT, cardiovascular, pulmonary, gi and gu systems are negative, except as otherwise noted.      Objective    /62   Pulse 66   Temp 96.2  F (35.7  C) (Tympanic)   Resp 12   Ht 1.63 m (5' 4.17\")   Wt 85.3 kg (188 lb)   SpO2 99%   Breastfeeding No   BMI 32.10 kg/m    Body mass index is 32.1 kg/m .  Physical Exam   GENERAL APPEARANCE: healthy, alert and no distress  RESP: lungs clear to auscultation - no rales, rhonchi or wheezes  CV: regular rates and rhythm, normal S1 S2, no S3 or S4 and no murmur, click or rub    Diagnostic Test Results:  Labs reviewed in Epic  Results for orders placed or performed in visit on 01/16/20 (from the past 24 hour(s))   **UA reflex to Microscopic FUTURE anytime   Result " Value Ref Range    Color Urine Yellow     Appearance Urine Clear     Glucose Urine Negative NEG^Negative mg/dL    Bilirubin Urine Negative NEG^Negative    Ketones Urine Negative NEG^Negative mg/dL    Specific Gravity Urine <=1.005 1.003 - 1.035    Blood Urine Negative NEG^Negative    pH Urine 6.5 5.0 - 7.0 pH    Protein Albumin Urine Negative NEG^Negative mg/dL    Urobilinogen Urine 0.2 0.2 - 1.0 EU/dL    Nitrite Urine Negative NEG^Negative    Leukocyte Esterase Urine Negative NEG^Negative    Source Midstream Urine            Assessment & Plan     1. Malodorous urine -no signs of bladder infection.  Symptoms could simply be due to relative dehydration given her lack of water intake and that the symptoms resolved with increasing her water intake.  The lower abdominal pain could just be related to GI cramping.   - **UA reflex to Microscopic FUTURE anytime    2. Benign essential hypertension - reasonably well controlled on current regimen.  Has had intolerance or side effects to other blood pressure medications.  The lisinopril/hydrochlorothiazide causes drowsiness but this is improved by taking it at night.  She prefers to continue medications unchanged at this point.  If blood pressure increases, would double the dose of lisinopril    3. Calculus of kidney -no signs of kidney stone today    4. Acquired hypothyroidism -due for labs  - TSH WITH FREE T4 REFLEX    5. CKD (chronic kidney disease) stage 4, GFR 15-29 ml/min (H) -patient prefers not to follow with nephrology at this time.  - BASIC METABOLIC PANEL  - Hemoglobin  - Albumin Random Urine Quantitative with Creat Ratio  - Parathyroid Hormone Intact; Future    6. Secondary renal hyperparathyroidism (H) -due for PTH levels in 6 months  - Parathyroid Hormone Intact; Future    7. Need for prophylactic vaccination and inoculation against influenza  - INFLUENZA VACCINE IM > 6 MONTHS VALENT IIV4 [87724]  - ADMIN INFLUENZA (For MEDICARE Patients ONLY) []     BMI:  "  Estimated body mass index is 32.1 kg/m  as calculated from the following:    Height as of this encounter: 1.63 m (5' 4.17\").    Weight as of this encounter: 85.3 kg (188 lb).   Weight management plan: Discussed healthy diet and exercise guidelines      1. Blood pressure appears to be well controlled.   If blood pressure starts trending > 140, then would take lisinopril 2 pills at bedtime.  2. Labs today  3. Call in for next refill of xanax      Ideal blood pressure is consistently less than 140.  Best way is take 2+ hours after you have taken your blood pressure medication, and not while drinking coffee, alcohol, using advil, sudafed, etc.  These can raise your blood pressure.   Sit quietly for several min with feet flat on ground.  With arm at heart level, take blood pressure, then again in 1 min.  Average the 2 readings and record this.  Can come back to see RN for blood pressure check.        Return in about 6 months (around 7/16/2020) for Routine Follow-Up/Med Check.    Nora Hernandez, DO  [facility]      "

## 2020-01-16 NOTE — PATIENT INSTRUCTIONS
1. Blood pressure appears to be well controlled.   If blood pressure starts trending > 140, then would take lisinopril 2 pills at bedtime.  2. Labs today  3. Call in for next refill of xanax      Ideal blood pressure is consistently less than 140.  Best way is take 2+ hours after you have taken your blood pressure medication, and not while drinking coffee, alcohol, using advil, sudafed, etc.  These can raise your blood pressure.   Sit quietly for several min with feet flat on ground.  With arm at heart level, take blood pressure, then again in 1 min.  Average the 2 readings and record this.  Can come back to see RN for blood pressure check.        Dr. Hernandez's Tips for a Healthy Diet    1. Add more fresh fruits and vegetables to your diet.  The more colorful with the fruit or vegetable (think berries, spinach, carrots, peppers) the healthier it tends to be.  Juice is not a fruit.  Prepare the vegetables in a healthy way - steam, bake.  Avoid batter/breading, butter/oil to cook.  2. Add more fiber to your diet.  Swap out white bread, white rice, white pasta for whole grain versions.  Reduce the portion size and frequency of carbohydrates/starches.  3. Choose healthier fats such as nuts, olive oil, avocado, etc. Stay away from lard, butter.  4. Decrease the frequency and portion size of 'junk food' -pizza, candy, cookies, potato chips, etc.  5. Watch liquid calories such as coffee drinks, juice, soda, teas.  There tends to be excessive sugar in these beverages.  6. Increase protein in your diet.  Eggs, cheese, yogurt, nuts, lentils, chicken, fish are good healthy choices.  Protein keeps you fox longer, and you are less likely to have blood sugar spikes  7. Eat healthy at least 80% of the time.  It is ok for a special treat (Mom's spaghetti dinner, cake on your birthday) every once in a while, just not every day.  When are you going to indulge (think [employer] time), be sure you are eating extra healthy the day  before and after.      Resources:    1. [facility] Resources for Health and Wellness:[email or web address]    2.   [facility] Ways To Wellness:    https://www.[facility].org/services/ways-[email or web address]-wellness  Ways to Wellness offers:    Nutrition and weight management     Corrective exercise and fitness training     Lifestyle and behavioral change     Healing services  Our team includes registered dietitians, certified personal trainers, life coaches, as well as a Culinary Educator.

## 2020-01-17 NOTE — RESULT ENCOUNTER NOTE
Thyroid, electrolytes, hemoglobin, urine protein test is normal.  The kidney function is mildly reduced, similar to previous values seen over the last 2 years.

## 2020-03-05 DIAGNOSIS — F41.1 GAD (GENERALIZED ANXIETY DISORDER): ICD-10-CM

## 2020-03-05 RX ORDER — ALPRAZOLAM 0.5 MG
0.5 TABLET ORAL 2 TIMES DAILY
Qty: 60 TABLET | Refills: 5 | Status: SHIPPED | OUTPATIENT
Start: 2020-03-05 | End: 2020-10-15

## 2020-03-05 NOTE — TELEPHONE ENCOUNTER
Requested Prescriptions   Pending Prescriptions Disp Refills     ALPRAZolam (XANAX) 0.5 MG tablet 60 tablet 5     Sig: Take 1 tablet (0.5 mg) by mouth 2 times daily       There is no refill protocol information for this order        Last Written Prescription Date:  8/7/19  Last Fill Quantity: 60,  # refills: 5   Last office visit: 10/29/2012 with prescribing provider:  David John Office Visit:

## 2020-03-05 NOTE — TELEPHONE ENCOUNTER
Routing refill request to provider for review/approval because:  Drug not on the [facility] refill protocol     Last OV 1/16/2020: Return in about 6 months (around 7/16/2020) for Routine Follow-Up/Med Check.    Tatiana CASILLAS RN

## 2020-08-22 DIAGNOSIS — K21.9 GASTROESOPHAGEAL REFLUX DISEASE WITHOUT ESOPHAGITIS: ICD-10-CM

## 2020-08-24 NOTE — TELEPHONE ENCOUNTER
"Requested Prescriptions   Pending Prescriptions Disp Refills     famotidine (PEPCID) 40 MG tablet [Pharmacy Med Name: FAMOTIDINE 40MG TABLET] 90 tablet 3     Sig: TAKE 1 TABLET BY MOUTH DAILY AT BEDTIME       H2 Blockers Protocol Passed - 8/22/2020  3:54 PM        Passed - Patient is age 12 or older        Passed - Recent (12 mo) or future (30 days) visit within the authorizing provider's specialty     Patient has had an office visit with the authorizing provider or a provider within the authorizing providers department within the previous 12 mos or has a future within next 30 days. See \"Patient Info\" tab in inbasket, or \"Choose Columns\" in Meds & Orders section of the refill encounter.              Passed - Medication is active on med list             "

## 2020-08-26 RX ORDER — FAMOTIDINE 40 MG/1
TABLET, FILM COATED ORAL
Qty: 90 TABLET | Refills: 3 | Status: SHIPPED | OUTPATIENT
Start: 2020-08-26 | End: 2021-07-08

## 2020-10-15 DIAGNOSIS — F41.1 GAD (GENERALIZED ANXIETY DISORDER): ICD-10-CM

## 2020-10-15 RX ORDER — ALPRAZOLAM 0.5 MG
0.5 TABLET ORAL 2 TIMES DAILY
Qty: 60 TABLET | Refills: 5 | Status: SHIPPED | OUTPATIENT
Start: 2020-10-15 | End: 2021-04-26

## 2020-10-15 NOTE — TELEPHONE ENCOUNTER
Requested Prescriptions   Pending Prescriptions Disp Refills     ALPRAZolam (XANAX) 0.5 MG tablet 60 tablet 5     Sig: Take 1 tablet (0.5 mg) by mouth 2 times daily       There is no refill protocol information for this order

## 2021-01-09 DIAGNOSIS — E03.9 ACQUIRED HYPOTHYROIDISM: ICD-10-CM

## 2021-01-09 NOTE — LETTER
January 14, 2021      Meryl Fournier  [address] [facility] [address]        To Whom It May Concern,     We received a refill request for your levothyroxine medication.  This medication has been refilled for 30 days as you are due for a virtual wellness check for further refills.  Please call [phone] to schedule an appointment.        Sincerely,        Nora Hernandez, DO

## 2021-01-11 NOTE — TELEPHONE ENCOUNTER
"Requested Prescriptions   Pending Prescriptions Disp Refills     levothyroxine (SYNTHROID/LEVOTHROID) 88 MCG tablet [Pharmacy Med Name: LEVOTHYROXINE 88MCG TABLET] 90 tablet 3     Sig: TAKE 1 TABLET BY MOUTH EVERY DAY       Thyroid Protocol Passed - 1/9/2021  1:00 AM        Passed - Patient is 12 years or older        Passed - Recent (12 mo) or future (30 days) visit within the authorizing provider's specialty     Patient has had an office visit with the authorizing provider or a provider within the authorizing providers department within the previous 12 mos or has a future within next 30 days. See \"Patient Info\" tab in inbasket, or \"Choose Columns\" in Meds & Orders section of the refill encounter.              Passed - Medication is active on med list        Passed - Normal TSH on file in past 12 months     Recent Labs   Lab Test 01/16/20  1413   TSH 3.26              Passed - No active pregnancy on record     If patient is pregnant or has had a positive pregnancy test, please check TSH.          Passed - No positive pregnancy test in past 12 months     If patient is pregnant or has had a positive pregnancy test, please check TSH.               "

## 2021-01-14 RX ORDER — LEVOTHYROXINE SODIUM 88 UG/1
TABLET ORAL
Qty: 30 TABLET | Refills: 0 | Status: SHIPPED | OUTPATIENT
Start: 2021-01-14 | End: 2021-02-12

## 2021-01-14 NOTE — TELEPHONE ENCOUNTER
Medication is being filled for 1 time refill only due to:  Patient needs to be seen because it has been more than one year since last visit.   Letter sent.    Maribell CHERY MSN, RN        .

## 2021-02-12 DIAGNOSIS — E03.9 ACQUIRED HYPOTHYROIDISM: ICD-10-CM

## 2021-02-12 RX ORDER — LEVOTHYROXINE SODIUM 88 UG/1
TABLET ORAL
Qty: 7 TABLET | Refills: 0 | Status: SHIPPED | OUTPATIENT
Start: 2021-02-12 | End: 2021-02-18

## 2021-02-12 NOTE — TELEPHONE ENCOUNTER
Pt already received 30 day lurdes refill on 1/14/21.  Sent #7 so she will not run out before appt 2/18/21.  Next 5 appointments (look out 90 days)    Feb 18, 2021  3:20 PM  PHYSICAL with Nora Hernandez DO  [facility] ([facility] [place] )  Arrive at: [facility] [address] [facility] [address]  [phone]         Tatiana CASILLAS, RN, BSN

## 2021-02-12 NOTE — TELEPHONE ENCOUNTER
"Requested Prescriptions   Pending Prescriptions Disp Refills     levothyroxine (SYNTHROID/LEVOTHROID) 88 MCG tablet [Pharmacy Med Name: LEVOTHYROXINE 88MCG TABLET] 30 tablet 0     Sig: TAKE 1 TABLET BY MOUTH DAILY       Thyroid Protocol Failed - 2/12/2021  1:00 AM        Failed - Normal TSH on file in past 12 months     Recent Labs   Lab Test 01/16/20  1413   TSH 3.26              Passed - Patient is 12 years or older        Passed - Recent (12 mo) or future (30 days) visit within the authorizing provider's specialty     Patient has had an office visit with the authorizing provider or a provider within the authorizing providers department within the previous 12 mos or has a future within next 30 days. See \"Patient Info\" tab in inbasket, or \"Choose Columns\" in Meds & Orders section of the refill encounter.              Passed - Medication is active on med list        Passed - No active pregnancy on record     If patient is pregnant or has had a positive pregnancy test, please check TSH.          Passed - No positive pregnancy test in past 12 months     If patient is pregnant or has had a positive pregnancy test, please check TSH.               "

## 2021-02-18 ENCOUNTER — OFFICE VISIT (OUTPATIENT)
Dept: FAMILY MEDICINE | Facility: CLINIC | Age: 76
End: 2021-02-18
Payer: COMMERCIAL

## 2021-02-18 VITALS
SYSTOLIC BLOOD PRESSURE: 126 MMHG | DIASTOLIC BLOOD PRESSURE: 62 MMHG | HEART RATE: 64 BPM | OXYGEN SATURATION: 98 % | TEMPERATURE: 97 F

## 2021-02-18 DIAGNOSIS — E03.9 ACQUIRED HYPOTHYROIDISM: ICD-10-CM

## 2021-02-18 DIAGNOSIS — N25.81 SECONDARY RENAL HYPERPARATHYROIDISM (H): ICD-10-CM

## 2021-02-18 DIAGNOSIS — E78.5 HYPERLIPIDEMIA LDL GOAL <130: Chronic | ICD-10-CM

## 2021-02-18 DIAGNOSIS — I10 BENIGN ESSENTIAL HYPERTENSION: Chronic | ICD-10-CM

## 2021-02-18 DIAGNOSIS — G89.29 CHRONIC PAIN OF LEFT KNEE: ICD-10-CM

## 2021-02-18 DIAGNOSIS — Z11.59 NEED FOR HEPATITIS C SCREENING TEST: ICD-10-CM

## 2021-02-18 DIAGNOSIS — Z13.220 SCREENING FOR HYPERLIPIDEMIA: ICD-10-CM

## 2021-02-18 DIAGNOSIS — M10.9 ACUTE GOUTY ARTHRITIS: ICD-10-CM

## 2021-02-18 DIAGNOSIS — Z00.00 ENCOUNTER FOR MEDICARE ANNUAL WELLNESS EXAM: Primary | ICD-10-CM

## 2021-02-18 DIAGNOSIS — N18.4 CKD (CHRONIC KIDNEY DISEASE) STAGE 4, GFR 15-29 ML/MIN (H): ICD-10-CM

## 2021-02-18 DIAGNOSIS — M25.50 MULTIPLE JOINT PAIN: ICD-10-CM

## 2021-02-18 DIAGNOSIS — M25.562 CHRONIC PAIN OF LEFT KNEE: ICD-10-CM

## 2021-02-18 LAB
ANION GAP SERPL CALCULATED.3IONS-SCNC: 5 MMOL/L (ref 3–14)
BUN SERPL-MCNC: 28 MG/DL (ref 7–30)
CALCIUM SERPL-MCNC: 9.7 MG/DL (ref 8.5–10.1)
CHLORIDE SERPL-SCNC: 101 MMOL/L (ref 94–109)
CHOLEST SERPL-MCNC: 253 MG/DL
CO2 SERPL-SCNC: 28 MMOL/L (ref 20–32)
CREAT SERPL-MCNC: 1.52 MG/DL (ref 0.52–1.04)
CREAT UR-MCNC: 36 MG/DL
ERYTHROCYTE [DISTWIDTH] IN BLOOD BY AUTOMATED COUNT: 12.1 % (ref 10–15)
GFR SERPL CREATININE-BSD FRML MDRD: 33 ML/MIN/{1.73_M2}
GLUCOSE SERPL-MCNC: 85 MG/DL (ref 70–99)
HCT VFR BLD AUTO: 38.3 % (ref 35–47)
HDLC SERPL-MCNC: 49 MG/DL
HGB BLD-MCNC: 12.7 G/DL (ref 11.7–15.7)
LDLC SERPL CALC-MCNC: 156 MG/DL
MCH RBC QN AUTO: 30.2 PG (ref 26.5–33)
MCHC RBC AUTO-ENTMCNC: 33.2 G/DL (ref 31.5–36.5)
MCV RBC AUTO: 91 FL (ref 78–100)
MICROALBUMIN UR-MCNC: 6 MG/L
MICROALBUMIN/CREAT UR: 16.6 MG/G CR (ref 0–25)
NONHDLC SERPL-MCNC: 204 MG/DL
PLATELET # BLD AUTO: 249 10E9/L (ref 150–450)
POTASSIUM SERPL-SCNC: 4.6 MMOL/L (ref 3.4–5.3)
PTH-INTACT SERPL-MCNC: 102 PG/ML (ref 18–80)
RBC # BLD AUTO: 4.21 10E12/L (ref 3.8–5.2)
SODIUM SERPL-SCNC: 134 MMOL/L (ref 133–144)
TRIGL SERPL-MCNC: 239 MG/DL
TSH SERPL DL<=0.005 MIU/L-ACNC: 2.32 MU/L (ref 0.4–4)
URATE SERPL-MCNC: 8.3 MG/DL (ref 2.6–6)
WBC # BLD AUTO: 12.4 10E9/L (ref 4–11)

## 2021-02-18 PROCEDURE — 36415 COLL VENOUS BLD VENIPUNCTURE: CPT | Performed by: INTERNAL MEDICINE

## 2021-02-18 PROCEDURE — 86803 HEPATITIS C AB TEST: CPT | Performed by: INTERNAL MEDICINE

## 2021-02-18 PROCEDURE — 99214 OFFICE O/P EST MOD 30 MIN: CPT | Mod: 25 | Performed by: INTERNAL MEDICINE

## 2021-02-18 PROCEDURE — 84443 ASSAY THYROID STIM HORMONE: CPT | Performed by: INTERNAL MEDICINE

## 2021-02-18 PROCEDURE — 84550 ASSAY OF BLOOD/URIC ACID: CPT | Performed by: INTERNAL MEDICINE

## 2021-02-18 PROCEDURE — 83970 ASSAY OF PARATHORMONE: CPT | Performed by: INTERNAL MEDICINE

## 2021-02-18 PROCEDURE — 85027 COMPLETE CBC AUTOMATED: CPT | Performed by: INTERNAL MEDICINE

## 2021-02-18 PROCEDURE — 82043 UR ALBUMIN QUANTITATIVE: CPT | Performed by: INTERNAL MEDICINE

## 2021-02-18 PROCEDURE — 82306 VITAMIN D 25 HYDROXY: CPT | Performed by: INTERNAL MEDICINE

## 2021-02-18 PROCEDURE — 80048 BASIC METABOLIC PNL TOTAL CA: CPT | Performed by: INTERNAL MEDICINE

## 2021-02-18 PROCEDURE — 99397 PER PM REEVAL EST PAT 65+ YR: CPT | Performed by: INTERNAL MEDICINE

## 2021-02-18 PROCEDURE — 80061 LIPID PANEL: CPT | Performed by: INTERNAL MEDICINE

## 2021-02-18 RX ORDER — VIT C/B6/B5/MAGNESIUM/HERB 173 50-5-6-5MG
CAPSULE ORAL
COMMUNITY
End: 2024-06-19

## 2021-02-18 RX ORDER — LISINOPRIL AND HYDROCHLOROTHIAZIDE 12.5; 2 MG/1; MG/1
1 TABLET ORAL DAILY
Qty: 90 TABLET | Refills: 3 | Status: SHIPPED | OUTPATIENT
Start: 2021-02-18 | End: 2021-06-25

## 2021-02-18 RX ORDER — PREDNISONE 20 MG/1
20 TABLET ORAL DAILY
Qty: 5 TABLET | Refills: 1 | Status: SHIPPED | OUTPATIENT
Start: 2021-02-18 | End: 2021-02-23

## 2021-02-18 RX ORDER — LEVOTHYROXINE SODIUM 88 UG/1
88 TABLET ORAL DAILY
Qty: 90 TABLET | Refills: 3 | Status: SHIPPED | OUTPATIENT
Start: 2021-02-18 | End: 2022-01-07

## 2021-02-18 NOTE — PROGRESS NOTES
Assessment & Plan     Encounter for Medicare annual wellness exam    CKD (chronic kidney disease) stage 4, GFR 15-29 ml/min (H) -patient has declined regular nephrology follow-up.  I would more strongly encourage this if her PTH is much higher.  - BASIC METABOLIC PANEL  - Albumin Random Urine Quantitative with Creat Ratio  - Parathyroid Hormone Intact  - Vitamin D Deficiency  - CBC with platelets  - OFFICE/OUTPT VISIT,EST,LEVL IV    Secondary renal hyperparathyroidism (H)  - Parathyroid Hormone Intact  - Vitamin D Deficiency  - CBC with platelets  - OFFICE/OUTPT VISIT,EST,LEVL IV    Acquired hypothyroidism - .rshrewf  - TSH WITH FREE T4 REFLEX  - levothyroxine (SYNTHROID/LEVOTHROID) 88 MCG tablet; Take 1 tablet (88 mcg) by mouth daily  - OFFICE/OUTPT VISIT,EST,LEVL IV    Benign essential hypertension patient reports is sometimes running a bit higher at home.  Follow-up if it persist above 150. -Continue medications unchanged.    L- lisinopril-hydrochlorothiazide (ZESTORETIC) 20-12.5 MG tablet; Take 1 tablet by mouth daily  - OFFICE/OUTPT VISIT,EST,LEVL IV    Hyperlipidemia LDL goal <130  - Lipid panel reflex to direct LDL Non-fasting  - OFFICE/OUTPT VISIT,EST,LEVL IV    Multiple joint pain -probably gout based on her description.  The pain in the hands is likely osteoarthritis, the pain in the foot is likely gout.  Patient opts to monitor for now  - Uric acid  - XR Hand Bilateral G/E 3 Views; Future  - XR Knee Left 1/2 Views; Future  - OFFICE/OUTPT VISIT,EST,LEVL IV    Chronic pain of left knee -probably osteoarthritis.  Not able to x-rays today since the x-ray machine is nonfunctional.  She will come back for x-rays after she has been vaccinated  - XR Knee Left 1/2 Views; Future  - Orthopedic & Spine  Referral; Future  - OFFICE/OUTPT VISIT,EST,LEVL IV    Screening for hyperlipidemia    Need for hepatitis C screening test  - Hepatitis C Screen Reflex to HCV RNA Quant and Genotype    Acute gouty  arthritis -have on hand in case of flareup.  - predniSONE (DELTASONE) 20 MG tablet; Take 1 tablet (20 mg) by mouth daily for 5 days  - OFFICE/OUTPT VISIT,ADE RENEE IV         Patient Instructions       Patient Education   Personalized Prevention Plan  You are due for the preventive services outlined below.  Your care team is available to assist you in scheduling these services.  If you have already completed any of these items, please share that information with your care team to update in your medical record.  Health Maintenance Due   Topic Date Due     COVID-19 Vaccine (1 of 2) 08/05/1961     Hepatitis C Screening  08/05/1963     Zoster (Shingles) Vaccine (1 of 2) 08/05/1993     Annual Wellness Visit  05/01/2019     FALL RISK ASSESSMENT  05/01/2019     Basic Metabolic Panel  04/16/2020     Kidney Microalbumin Urine Test  07/16/2020     Hemoglobin  07/16/2020     Cholesterol Lab  07/18/2020     PHQ-2  01/01/2021     Thyroid Function Lab  01/16/2021             Kidneys:  1. Will check blood and urine today  2. If parathyroid values are worse, would recommend to see kidney doctor    Gout:  1. Could be due to hydrochlorothiazide or genetics or kidney disease or all  2. We discussed treatment - stopping hydrochlorothiazide or starting preventative med (allopurinol) or monitoring.  You opted for monitoring.  Use Prednisone 20 mg daily x 5 days for flare up of gout.  3. If you develop 3 or more episodes of gout in a year, then we should stop the hydrochlorothiazide     Blood pressure:  1. Continue lisinopril/HCTZ    Knee pain:   1. Tylenol 500-1000 mg up to 3 x day as needed  2. Try topicals such as Voltaren, Aspercream with Lidocaine, Capsaicin, Icy Hot, Biofreeze, Salon Pas   3. Knee brace/sleeve, ice.  4. physical therapy - referral placed  5. Sports med for consideration of injection - referral placed      Treatments for arthritis:  1. Over the counter pain medications   A. Tylenol (Acetaminophen) 500-1000 mg 3 x day  as needed   B. Ibuprofen (or other NSAIDs such as Aleve, Aspirin, Advil) 400-600 mg 3 x day as needed - can alternate with Tylenol  NO   C. Supplement such as Glucosamine with Chondroitin   2. Over the counter topical   A. Aspercream with Lidocaine, Capsaicin, Icy Hot, Biofreeze, Salon Pas, Blue Emu, Voltaren  3. Prescription pain medications (NSAIDS)   A. Celebrex 100-200 mg 2 x day as needed.  Similar to Ibuprofen, cannot take along with Celebrex NO   B. Prescription Ibuprofen - NO  4. Physical therapy   5. Heat, ice, stretching, braces, modified activity  6. Sports Medicine or Orthopedics for Injections (steroids, 'rooster comb')  7. Joint replacement            COVID Vaccine:  --I strongly encourage you to get a COVID vaccine when it is available to you.    --I received my COVID vaccine already  --The COVID vaccine is safe.  Most serious adverse reactions happen within the first few days or weeks.  Your chance of having a serious complication related to COVID is much higher than having a serious adverse reaction to the vaccine.  --Many people will have low grade fevers, general body aches and fatigue for 1-2 days after getting the vaccine.  This is a sign that your immune system is activated - this is a good thing!  --If you have an allergy to Miralax, you should not get the vaccine.   --Stopping a pandemic requires all of us to do our part. Getting vaccinated is another step you can take to help reduce your chance of being exposed to the virus and spreading it to others. Together, the COVID-19 vaccination and following CDC's recommendations to protect yourself and others will offer the best protection from COVID-19. Wear a mask, wash your hands, stay at least 6 feet from others, and remain home if you're sick.  --If you encounter information about the vaccine that you would like further clarification on, please reach out to my team!  Send me a My Chart message or make an appointment with me.    More information  about the COVID vaccine timeline, etc  [facility] website  [email or web address]    If you are 75+:  Call [phone] to schedule vaccine.      [facility] COVID vaccine lottery    [place]://[place]id19/vaccine/find-vaccine/index.jsp    [facility] 65+  [email or web address]      [facility] or [employer]  Patient Education   Personalized Prevention Plan  You are due for the preventive services outlined below.  Your care team is available to assist you in scheduling these services.  If you have already completed any of these items, please share that information with your care team to update in your medical record.  Health Maintenance Due   Topic Date Due     COVID-19 Vaccine (1 of 2) 08/05/1961     Zoster (Shingles) Vaccine (1 of 2) 08/05/1993     FALL RISK ASSESSMENT  05/01/2019     Hemoglobin  07/16/2020            Return in about 53 weeks (around 2/24/2022) for Annual Wellness Visit.    Nora Hernandez DO  [facility] HealthLINDSEY Sheth is a 75 year old who presents for the following health issues     HPI       COV-19 VACCINATION: Pt is in the waiting list  Medicare visit: today  Shingles: Check insurance    Had 2 episodes that believe was gout, 1st in Aug/2020 very painful, 3 days later was gone. 2nd episode Fall/2020 index finger right hand, still red and is lack or ROM, now the right hand thumb is with somewhat ROM -  --no prior diagnosis of gout, but father had this  --left great toe - was red, hot, swollen and exquisite pain.  Lasted 4-5 days  --end episode was in the fall, left index finger was red, hot, swollen, tender.  The DIP remains swollen and red, not as painful.    Sometimes have episodes that her urine goes very dark off and on, increasing fluids, have a hard time increasing fluids      Left knee, when in the floor have a hard time standing up, in the past year hurts all the time,  bother her sleep, no swelling, all of the time 3/10 pain scale, in the morning 7/10 pain scale. When sitting in the same position too long throbs.   --aching, gelling  --no prior xray on file      Hyperlipidemia Follow-Up      Are you regularly taking any medication or supplement to lower your cholesterol?   No    Are you having muscle aches or other side effects that you think could be caused by your cholesterol lowering medication?  No    Hypertension Follow-up    Lately all over the place      Do you check your blood pressure regularly outside of the clinic? Yes     Are you following a low salt diet? Yes    Are your blood pressures ever more than 140 on the top number (systolic) OR more   than 90 on the bottom number (diastolic), for example 140/90? No   Has been on lisinopril since at least .    Vascular Disease Follow-up      How often do you take nitroglycerin? Never    Do you take an aspirin every day? Yes    Depression and Anxiety Follow-Up    How are you doing with your depression since your last visit? No change    How are you doing with your anxiety since your last visit?  No change    Are you having other symptoms that might be associated with depression or anxiety? No    Have you had a significant life event? No     Do you have any concerns with your use of alcohol or other drugs? No    Social History     Tobacco Use     Smoking status: Former Smoker     Packs/day: 0.50     Years: 51.00     Pack years: 25.50     Types: Cigarettes     Start date: 1963     Quit date: 2014     Years since quittin.6     Smokeless tobacco: Never Used     Tobacco comment: using E-cigs   Substance Use Topics     Alcohol use: No     Drug use: No     PHQ 2018   PHQ-9 Total Score 8 7 7   Q9: Thoughts of better off dead/self-harm past 2 weeks Not at all Not at all Not at all     BRISSA-7 SCORE 2017   Total Score - - -   Total Score 6 3 15     Last PHQ-9 2019   1.   Little interest or pleasure in doing things 1   2.  Feeling down, depressed, or hopeless 1   3.  Trouble falling or staying asleep, or sleeping too much 0   4.  Feeling tired or having little energy 3   5.  Poor appetite or overeating 1   6.  Feeling bad about yourself 1   7.  Trouble concentrating 0   8.  Moving slowly or restless 0   Q9: Thoughts of better off dead/self-harm past 2 weeks 0   PHQ-9 Total Score 7   Difficulty at work, home, or with people -     BRISSA-7  2/7/2019   1. Feeling nervous, anxious, or on edge 3   2. Not being able to stop or control worrying 3   3. Worrying too much about different things 3   4. Trouble relaxing 3   5. Being so restless that it is hard to sit still 1   6. Becoming easily annoyed or irritable 1   7. Feeling afraid, as if something awful might happen 1   BRISSA-7 Total Score 15   If you checked any problems, how difficult have they made it for you to do your work, take care of things at home, or get along with other people? Somewhat difficult       Suicide Assessment Five-step Evaluation and Treatment (SAFE-T)    Chronic Kidney Disease Follow-up    Do you take any over the counter pain medicine?: Yes  What over the counter medicine are you taking for your pain?:  Aspirin 81 mg      How often do you take this medicine?:  daily    Hypothyroidism Follow-up      Since last visit, patient describes the following symptoms: fatigue and hair loss        Annual Wellness Visit    Patient has been advised of split billing requirements and indicates understanding: Yes     Are you in the first 12 months of your Medicare Part B coverage?  No    Physical Health:    In general, how would you rate your overall physical health? good    Outside of work, how many days during the week do you exercise?none    Outside of work, approximately how many minutes a day do you exercise?not applicable    If you drink alcohol do you typically have >3 drinks per day or >7 drinks per week? No    Do you usually  "eat at least 4 servings of fruit and vegetables a day, include whole grains & fiber and avoid regularly eating high fat or \"junk\" foods? Yes    Do you have any problems taking medications regularly? No    Do you have any side effects from medications? none    Needs assistance for the following daily activities: no assistance needed    Which of the following safety concerns are present in your home?  none identified     Hearing impairment: No    In the past 6 months, have you been bothered by leaking of urine? yes    Mental Health:    In general, how would you rate your overall mental or emotional health? good  PHQ-2 Score:  0    Do you feel safe in your environment? Yes    Have you ever done Advance Care Planning? (For example, a Health Directive, POLST, or a discussion with a medical provider or your loved ones about your wishes)? Yes, advance care planning is on file.    Fall risk:  Fallen 2 or more times in the past year?: No  Any fall with injury in the past year?: No    Cognitive Screenin) Repeat 3 items (Leader, Season, Table)  All 3 words repeated back.  2) Clock draw: NORMAL  3) 3 item recall: Recalls 3 objects  Results: 3 items recalled: COGNITIVE IMPAIRMENT LESS LIKELY    Mini-CogTM Copyright S Travis. Licensed by the author for use in [facility]; reprinted with permission (soob@.[email or web address]). All rights reserved.      Do you have sleep apnea, excessive snoring or daytime drowsiness?: yes    Current providers sharing in care for this patient include:   Patient Care Team:  Nora Hernandez DO as PCP - General (Internal Medicine)  Nora Hernandez DO as Assigned PCP    Patient has been advised of split billing requirements and indicates understanding: Yes    Review of Systems   Constitutional, HEENT, cardiovascular, pulmonary, GI, , musculoskeletal, neuro, skin, endocrine and psych systems are negative, except as otherwise noted.      Objective    /62   Pulse 64   Temp 97  F " (36.1  C) (Tympanic)   SpO2 98%   Breastfeeding No   There is no height or weight on file to calculate BMI.  Physical Exam   GENERAL APPEARANCE: healthy, alert and no distress  EYES: Eyes grossly normal to inspection, PERRL and conjunctivae and sclerae normal  HENT: ear canals and TM's normal and nose and mouth without ulcers or lesions  NECK: no adenopathy, no asymmetry, masses, or scars and thyroid normal to palpation  RESP: lungs clear to auscultation - no rales, rhonchi or wheezes  CV: regular rates and rhythm, normal S1 S2, no S3 or S4 and no murmur, click or rub  ABDOMEN: soft, nontender, without hepatosplenomegaly or masses and bowel sounds normal  MS: mild swelling of left DIP w/out tenderness  SKIN: no suspicious lesions or rashes  PSYCH: mentation appears normal and affect normal/bright

## 2021-02-18 NOTE — LETTER
February 22, 2021      Meryl Fournier  [address] [facility] [address]        Dear ,    We are writing to inform you of your test results.    Urine is normal - no protein.   Negative for Hepatitis C.   Vitamin D is normal.   Parathyroid hormone is elevated, similar to previous values.  Will check in 1 year.   Thyroid is normal   Cholesterol is elevated, similar to 1 year ago.  We might need to reconsider a prescription medication for this.  Lets schedule a telephone follow-up visit to discuss further.   Kidney function is stably low.   Uric acid is high.   White blood cell count is mildly elevated, but other blood counts are normal.  Elevated white blood cell count can be seen due to inflammation.     Resulted Orders   Hepatitis C Screen Reflex to HCV RNA Quant and Genotype   Result Value Ref Range    Hepatitis C Antibody Nonreactive NR^Nonreactive      Comment:      Assay performance characteristics have not been established for newborns,   infants, and children     BASIC METABOLIC PANEL   Result Value Ref Range    Sodium 134 133 - 144 mmol/L    Potassium 4.6 3.4 - 5.3 mmol/L    Chloride 101 94 - 109 mmol/L    Carbon Dioxide 28 20 - 32 mmol/L    Anion Gap 5 3 - 14 mmol/L    Glucose 85 70 - 99 mg/dL    Urea Nitrogen 28 7 - 30 mg/dL    Creatinine 1.52 (H) 0.52 - 1.04 mg/dL    GFR Estimate 33 (L) >60 mL/min/[1.73_m2]      Comment:      Non  GFR Calc  Starting 12/18/2018, serum creatinine based estimated GFR (eGFR) will be   calculated using the Chronic Kidney Disease Epidemiology Collaboration   (CKD-EPI) equation.      GFR Estimate If Black 38 (L) >60 mL/min/[1.73_m2]      Comment:       GFR Calc  Starting 12/18/2018, serum creatinine based estimated GFR (eGFR) will be   calculated using the Chronic Kidney Disease Epidemiology Collaboration   (CKD-EPI) equation.      Calcium 9.7 8.5 - 10.1 mg/dL   Albumin Random Urine Quantitative with Creat Ratio   Result Value  Ref Range    Creatinine Urine 36 mg/dL    Albumin Urine mg/L 6 mg/L    Albumin Urine mg/g Cr 16.60 0 - 25 mg/g Cr   Lipid panel reflex to direct LDL Non-fasting   Result Value Ref Range    Cholesterol 253 (H) <200 mg/dL      Comment:      Desirable:       <200 mg/dl    Triglycerides 239 (H) <150 mg/dL      Comment:      Borderline high:  150-199 mg/dl  High:             200-499 mg/dl  Very high:       >499 mg/dl      HDL Cholesterol 49 (L) >49 mg/dL    LDL Cholesterol Calculated 156 (H) <100 mg/dL      Comment:      Above desirable:  100-129 mg/dl  Borderline High:  130-159 mg/dL  High:             160-189 mg/dL  Very high:       >189 mg/dl      Non HDL Cholesterol 204 (H) <130 mg/dL      Comment:      Above Desirable:  130-159 mg/dl  Borderline high:  160-189 mg/dl  High:             190-219 mg/dl  Very high:       >219 mg/dl     TSH WITH FREE T4 REFLEX   Result Value Ref Range    TSH 2.32 0.40 - 4.00 mU/L   Parathyroid Hormone Intact   Result Value Ref Range    Parathyroid Hormone Intact 102 (H) 18 - 80 pg/mL   Vitamin D Deficiency   Result Value Ref Range    Vitamin D Deficiency screening 58 20 - 75 ug/L      Comment:      Season, race, dietary intake, and treatment affect the concentration of   25-hydroxy-Vitamin D. Values may decrease during winter months and increase   during summer months. Values 20-29 ug/L may indicate Vitamin D insufficiency   and values <20 ug/L may indicate Vitamin D deficiency.  Vitamin D determination is routinely performed by an immunoassay specific for   25 hydroxyvitamin D3.  If an individual is on vitamin D2 (ergocalciferol)   supplementation, please specify 25 OH vitamin D2 and D3 level determination by   LCMSMS test VITD23.     CBC with platelets   Result Value Ref Range    WBC 12.4 (H) 4.0 - 11.0 10e9/L    RBC Count 4.21 3.8 - 5.2 10e12/L    Hemoglobin 12.7 11.7 - 15.7 g/dL    Hematocrit 38.3 35.0 - 47.0 %    MCV 91 78 - 100 fl    MCH 30.2 26.5 - 33.0 pg    MCHC 33.2 31.5 - 36.5  g/dL    RDW 12.1 10.0 - 15.0 %    Platelet Count 249 150 - 450 10e9/L   Uric acid   Result Value Ref Range    Uric Acid 8.3 (H) 2.6 - 6.0 mg/dL       If you have any questions or concerns, please call the clinic at the number listed above.       Sincerely,      Nora Hernandez, DO

## 2021-02-18 NOTE — PATIENT INSTRUCTIONS
Patient Education   Personalized Prevention Plan  You are due for the preventive services outlined below.  Your care team is available to assist you in scheduling these services.  If you have already completed any of these items, please share that information with your care team to update in your medical record.  Health Maintenance Due   Topic Date Due     COVID-19 Vaccine (1 of 2) [birth year]05/1959     Hepatitis C Screening  08/05/1961     Zoster (Shingles) Vaccine (1 of 2) 08/05/1993     Annual Wellness Visit  05/01/2019     FALL RISK ASSESSMENT  05/01/2019     Basic Metabolic Panel  04/16/2020     Kidney Microalbumin Urine Test  07/16/2020     Hemoglobin  07/16/2020     Cholesterol Lab  07/18/2020     PHQ-2  01/01/2021     Thyroid Function Lab  01/16/2021             Kidneys:  1. Will check blood and urine today  2. If parathyroid values are worse, would recommend to see kidney doctor    Gout:  1. Could be due to hydrochlorothiazide or genetics or kidney disease or all  2. We discussed treatment - stopping hydrochlorothiazide or starting preventative med (allopurinol) or monitoring.  You opted for monitoring.  Use Prednisone 20 mg daily x 5 days for flare up of gout.  3. If you develop 3 or more episodes of gout in a year, then we should stop the hydrochlorothiazide     Blood pressure:  1. Continue lisinopril/HCTZ    Knee pain:   1. Tylenol 500-1000 mg up to 3 x day as needed  2. Try topicals such as Voltaren, Aspercream with Lidocaine, Capsaicin, Icy Hot, Biofreeze, Salon Pas   3. Knee brace/sleeve, ice.  4. physical therapy - referral placed  5. Sports med for consideration of injection - referral placed      Treatments for arthritis:  1. Over the counter pain medications   A. Tylenol (Acetaminophen) 500-1000 mg 3 x day as needed   B. Ibuprofen (or other NSAIDs such as Aleve, Aspirin, Advil) 400-600 mg 3 x day as needed - can alternate with Tylenol  NO   C. Supplement such as Glucosamine with Chondroitin   2. Over  the counter topical   A. Aspercream with Lidocaine, Capsaicin, Icy Hot, Biofreeze, Salon Pas, Blue Emu, Voltaren  3. Prescription pain medications (NSAIDS)   A. Celebrex 100-200 mg 2 x day as needed.  Similar to Ibuprofen, cannot take along with Celebrex NO   B. Prescription Ibuprofen - NO  4. Physical therapy   5. Heat, ice, stretching, braces, modified activity  6. Sports Medicine or Orthopedics for Injections (steroids, 'rooster comb')  7. Joint replacement            COVID Vaccine:  --I strongly encourage you to get a COVID vaccine when it is available to you.    --I received my COVID vaccine already  --The COVID vaccine is safe.  Most serious adverse reactions happen within the first few days or weeks.  Your chance of having a serious complication related to COVID is much higher than having a serious adverse reaction to the vaccine.  --Many people will have low grade fevers, general body aches and fatigue for 1-2 days after getting the vaccine.  This is a sign that your immune system is activated - this is a good thing!  --If you have an allergy to Miralax, you should not get the vaccine.   --Stopping a pandemic requires all of us to do our part. Getting vaccinated is another step you can take to help reduce your chance of being exposed to the virus and spreading it to others. Together, the COVID-19 vaccination and following CDC's recommendations to protect yourself and others will offer the best protection from COVID-19. Wear a mask, wash your hands, stay at least 6 feet from others, and remain home if you're sick.  --If you encounter information about the vaccine that you would like further clarification on, please reach out to my team!  Send me a My Chart message or make an appointment with me.    More information about the COVID vaccine timeline, etc  [employer] website  [email or web address]    If you are 75+:  Call [phone] to schedule vaccine.      [facility] COVID vaccine  miladys    https://[place]covid19/vaccine/find-vaccine/index.jsp    [facility] 65+  [email or web address]      [facility] or [employer]  Patient Education   Personalized Prevention Plan  You are due for the preventive services outlined below.  Your care team is available to assist you in scheduling these services.  If you have already completed any of these items, please share that information with your care team to update in your medical record.  Health Maintenance Due   Topic Date Due     COVID-19 Vaccine (1 of 2) 08/05/1959     Zoster (Shingles) Vaccine (1 of 2) 08/05/1993     FALL RISK ASSESSMENT  05/01/2019     Hemoglobin  07/16/2020

## 2021-02-19 LAB
DEPRECATED CALCIDIOL+CALCIFEROL SERPL-MC: 58 UG/L (ref 20–75)
HCV AB SERPL QL IA: NONREACTIVE

## 2021-02-19 NOTE — RESULT ENCOUNTER NOTE
Urine is normal - no protein.   Negative for Hepatitis C.  Vitamin D is normal.  Parathyroid hormone is elevated, similar to previous values.  Will check in 1 year.  Thyroid is normal  Cholesterol is elevated, similar to 1 year ago.  We might need to reconsider a prescription medication for this.  Lets schedule a telephone follow-up visit to discuss further.  Kidney function is stably low.  Uric acid is high.  White blood cell count is mildly elevated, but other blood counts are normal.  Elevated white blood cell count can be seen due to inflammation.

## 2021-02-24 ENCOUNTER — IMMUNIZATION (OUTPATIENT)
Dept: FAMILY MEDICINE | Facility: CLINIC | Age: 76
End: 2021-02-24
Payer: COMMERCIAL

## 2021-02-24 PROCEDURE — 0001A PR COVID VAC PFIZER DIL RECON 30 MCG/0.3 ML IM: CPT

## 2021-02-24 PROCEDURE — 91300 PR COVID VAC PFIZER DIL RECON 30 MCG/0.3 ML IM: CPT

## 2021-03-17 ENCOUNTER — IMMUNIZATION (OUTPATIENT)
Dept: FAMILY MEDICINE | Facility: CLINIC | Age: 76
End: 2021-03-17
Attending: FAMILY MEDICINE
Payer: COMMERCIAL

## 2021-03-17 PROCEDURE — 0002A PR COVID VAC PFIZER DIL RECON 30 MCG/0.3 ML IM: CPT

## 2021-03-17 PROCEDURE — 91300 PR COVID VAC PFIZER DIL RECON 30 MCG/0.3 ML IM: CPT

## 2021-03-23 ENCOUNTER — OFFICE VISIT (OUTPATIENT)
Dept: ORTHOPEDICS | Facility: CLINIC | Age: 76
End: 2021-03-23
Attending: INTERNAL MEDICINE
Payer: COMMERCIAL

## 2021-03-23 ENCOUNTER — ANCILLARY PROCEDURE (OUTPATIENT)
Dept: GENERAL RADIOLOGY | Facility: CLINIC | Age: 76
End: 2021-03-23
Attending: FAMILY MEDICINE
Payer: COMMERCIAL

## 2021-03-23 VITALS
DIASTOLIC BLOOD PRESSURE: 73 MMHG | WEIGHT: 187 LBS | HEIGHT: 65 IN | BODY MASS INDEX: 31.16 KG/M2 | SYSTOLIC BLOOD PRESSURE: 158 MMHG

## 2021-03-23 DIAGNOSIS — M25.562 CHRONIC PAIN OF LEFT KNEE: Primary | ICD-10-CM

## 2021-03-23 DIAGNOSIS — M25.562 CHRONIC PAIN OF LEFT KNEE: ICD-10-CM

## 2021-03-23 DIAGNOSIS — G89.29 CHRONIC PAIN OF LEFT KNEE: Primary | ICD-10-CM

## 2021-03-23 DIAGNOSIS — G89.29 CHRONIC PAIN OF LEFT KNEE: ICD-10-CM

## 2021-03-23 DIAGNOSIS — M25.462 KNEE EFFUSION, LEFT: ICD-10-CM

## 2021-03-23 DIAGNOSIS — M17.12 PRIMARY OSTEOARTHRITIS OF LEFT KNEE: ICD-10-CM

## 2021-03-23 PROCEDURE — 73562 X-RAY EXAM OF KNEE 3: CPT | Performed by: RADIOLOGY

## 2021-03-23 PROCEDURE — 99204 OFFICE O/P NEW MOD 45 MIN: CPT | Performed by: FAMILY MEDICINE

## 2021-03-23 ASSESSMENT — MIFFLIN-ST. JEOR: SCORE: 1334.11

## 2021-03-23 NOTE — PROGRESS NOTES
Meryl Fournier  :  1945  DOS: 3/23/2021  MRN: [id number]    Sports Medicine Clinic Visit    PCP: Nora Hernandez    Meryl Fuornier is a 75 year old female who is seen in consultation at the request of  Nora Hernandez D.O. presenting with left knee pain.    Injury: 2-3 year(s) - Reports no acute injury / trauma.  Pain located over left anterior knee, nonradiating.  Additional Features:  Positive: locking, catching, achy pain in leg.  Symptoms are better with frequent position changes.  Symptoms are worse with: prolonged walking, prolonged sitting.  Other evaluation and/or treatments so far consists of: Heat and voltaren gel.  Recent imaging completed: No recent imaging completed.  Prior History of related problems: none    Social History: retired    Review of Systems  Musculoskeletal: as above  Remainder of review of systems is negative including constitutional, CV, pulmonary, GI, Skin and Neurologic except as noted in HPI or medical history.    Past Medical History:   Diagnosis Date     Absence of menstruation [id number]     Esophageal reflux      Other anxiety states      Unspecified hypothyroidism      Past Surgical History:   Procedure Laterality Date     PHACOEMULSIFICATION WITH STANDARD INTRAOCULAR LENS IMPLANT Left 10/28/2019    Procedure: Cataract Removal with Implant;  Surgeon: Tyree Franz MD;  Location: [facility]     PHACOEMULSIFICATION WITH STANDARD INTRAOCULAR LENS IMPLANT Right 2019    Procedure: Cataract Removal with Implant;  Surgeon: Tyree Franz MD;  Location: [facility]     SURGICAL HISTORY OF -       TUBAL LIGATION     SURGICAL HISTORY OF -       COLONOSCOPY NL EXAM     SURGICAL HISTORY OF -       GASTROSCOPY - NL EXAM     Family History   Problem Relation Age of Onset     Cancer - colorectal Mother      Arthritis Mother      Eye Disorder Mother      Thyroid Disease Mother         goiter removed     Heart Disease Father      Depression Father   "    Arthritis Father      Kidney Disease Father         issues later in life     Thyroid Disease Daughter      Eye Disorder Daughter         due to graves disease       Objective  BP (!) 158/73   Ht 1.651 m (5' 5\")   Wt 84.8 kg (187 lb)   BMI 31.12 kg/m        General: healthy, alert and in no distress      HEENT: no scleral icterus or conjunctival erythema     Skin: no suspicious lesions or rash. No jaundice.     CV: regular rhythm by palpation, 2+ distal pulses, no pedal edema      Resp: normal respiratory effort without conversational dyspnea     Psych: normal mood and affect      Gait: antalgic, appropriate coordination and balance     Neuro: normal light touch sensory exam of the extremities. Motor strength as noted below     Left Knee exam    ROM:        Flexion 110 degrees       Extension 0 degrees       Range of motion limited by pain with terminal flexion    Inspection:       no visible ecchymosis        effusion noted small    Skin:       no visible deformities       well perfused       capillary refill brisk    Patellar Motion:        Normal patellar tracking noted through range of motion       Lateral tilt noted in patella       Crepitus noted in the patellofemoral joint    Tender:        lateral patellar border       lateral joint line    Non Tender:         remainder of knee area        medial patellar border        medial joint line        along MCL        distal IT Band        infrapatellar tendon        tibial tubercle       pes anserine bursa    Special Tests:        neg (-) Pako       neg (-) Lachman       neg (-) anterior drawer       neg (-) posterior drawer       neg (-) varus at 0 deg and 30 deg       neg (-) valgus at 0 deg and 30 deg    Evaluation of ipsilateral kinetic chain       decreased strength with resisted abduction of the left hip       decreased strength with resisted extension of the left hip      Radiology  Recent Results (from the past 744 hour(s))   XR Knee Standing AP " Bilat [place] Bilat Lat Left    Narrative    KNEE STANDING AP BILATERAL SUNRISE BILATERAL LATERAL LEFT  3/23/2021  1:18 PM     HISTORY: Chronic pain of left knee.    COMPARISON: None.      Impression    IMPRESSION: Normal joint spacing. No fracture. Small left knee joint  effusion.    DORA PATEL MD       Assessment:  1. Chronic pain of left knee    2. Knee effusion, left    3. Primary osteoarthritis of left knee        Plan:  Discussed the assessment with the patient.  Follow up: prn based on clinical progress  No instability on exam or by hx  Most sx are PF in nature, some lateral compartment as well  XR images independently visualized and reviewed with patient today in clinic  Imaging is reassuring, though there is some DJD present with marginal osteophytes in the PF compartment and narrowing of the lateral compartment, which are her sites of pain  Reviewed role of diet, exercise in pain mgmt  Relatively sedentary but has been increasing activity lately with resulting pain relief  Continue to increase low impact activity as tolerated, and walking is encouraged as well  Could consider CSI in the future, especially if her effusion does not resolve, or pain dramatically increases again  Continue voltaren gel, OTC tylenol prn for pain, could consider tumeric supplement as well  PT available if desired for HEP guidance  Compression sleeve use and options/strategies reviewed  We discussed modified progressive pain-free activity as tolerated  Home handouts provided and supportive care reviewed  All questions were answered today  Contact us with additional questions or concerns  Signs and sx of concern reviewed    Thanks very much for sending this nice lady to us, I will keep you updated with her progress      Vinnie Kilgore DO, CAQ  Sports Medicine Physician  [facility]        Disclaimer: This note consists of symbols derived from keyboarding, dictation and/or voice recognition  software. As a result, there may be errors in the script that have gone undetected. Please consider this when interpreting information found in this chart.

## 2021-03-23 NOTE — LETTER
3/23/2021         RE: Meryl Fournier  [address] [facility] [address]        Dear Colleague,    Thank you for referring your patient, Meryl Fournier, to the [facility] [place]. Please see a copy of my visit note below.    Meryl Fournier  :  1945  DOS: 3/23/2021  MRN: [id number]    Sports Medicine Clinic Visit    PCP: Nora Hernandez    Meryl Fournier is a 75 year old female who is seen in consultation at the request of  Nora Hernandez D.O. presenting with left knee pain.    Injury: 2-3 year(s) - Reports no acute injury / trauma.  Pain located over left anterior knee, nonradiating.  Additional Features:  Positive: locking, catching, achy pain in leg.  Symptoms are better with frequent position changes.  Symptoms are worse with: prolonged walking, prolonged sitting.  Other evaluation and/or treatments so far consists of: Heat and voltaren gel.  Recent imaging completed: No recent imaging completed.  Prior History of related problems: none    Social History: retired    Review of Systems  Musculoskeletal: as above  Remainder of review of systems is negative including constitutional, CV, pulmonary, GI, Skin and Neurologic except as noted in HPI or medical history.    Past Medical History:   Diagnosis Date     Absence of menstruation [id number]     Esophageal reflux      Other anxiety states      Unspecified hypothyroidism      Past Surgical History:   Procedure Laterality Date     PHACOEMULSIFICATION WITH STANDARD INTRAOCULAR LENS IMPLANT Left 10/28/2019    Procedure: Cataract Removal with Implant;  Surgeon: Tyree Franz MD;  Location: [facility]     PHACOEMULSIFICATION WITH STANDARD INTRAOCULAR LENS IMPLANT Right 2019    Procedure: Cataract Removal with Implant;  Surgeon: Tyree Franz MD;  Location: [facility]     SURGICAL HISTORY OF -       TUBAL LIGATION     SURGICAL HISTORY OF -       COLONOSCOPY NL EXAM     SURGICAL  "HISTORY OF -   2003    GASTROSCOPY - NL EXAM     Family History   Problem Relation Age of Onset     Cancer - colorectal Mother      Arthritis Mother      Eye Disorder Mother      Thyroid Disease Mother         goiter removed     Heart Disease Father      Depression Father      Arthritis Father      Kidney Disease Father         issues later in life     Thyroid Disease Daughter      Eye Disorder Daughter         due to graves disease       Objective  BP (!) 158/73   Ht 1.651 m (5' 5\")   Wt 84.8 kg (187 lb)   BMI 31.12 kg/m        General: healthy, alert and in no distress      HEENT: no scleral icterus or conjunctival erythema     Skin: no suspicious lesions or rash. No jaundice.     CV: regular rhythm by palpation, 2+ distal pulses, no pedal edema      Resp: normal respiratory effort without conversational dyspnea     Psych: normal mood and affect      Gait: antalgic, appropriate coordination and balance     Neuro: normal light touch sensory exam of the extremities. Motor strength as noted below     Left Knee exam    ROM:        Flexion 110 degrees       Extension 0 degrees       Range of motion limited by pain with terminal flexion    Inspection:       no visible ecchymosis        effusion noted small    Skin:       no visible deformities       well perfused       capillary refill brisk    Patellar Motion:        Normal patellar tracking noted through range of motion       Lateral tilt noted in patella       Crepitus noted in the patellofemoral joint    Tender:        lateral patellar border       lateral joint line    Non Tender:         remainder of knee area        medial patellar border        medial joint line        along MCL        distal IT Band        infrapatellar tendon        tibial tubercle       pes anserine bursa    Special Tests:        neg (-) Pako       neg (-) Lachman       neg (-) anterior drawer       neg (-) posterior drawer       neg (-) varus at 0 deg and 30 deg       neg (-) valgus " at 0 deg and 30 deg    Evaluation of ipsilateral kinetic chain       decreased strength with resisted abduction of the left hip       decreased strength with resisted extension of the left hip      Radiology  Recent Results (from the past 744 hour(s))   XR Knee Standing AP Bilat [place] Bilat Lat Left    Narrative    KNEE STANDING AP BILATERAL [place] BILATERAL LATERAL LEFT  3/23/2021  1:18 PM     HISTORY: Chronic pain of left knee.    COMPARISON: None.      Impression    IMPRESSION: Normal joint spacing. No fracture. Small left knee joint  effusion.    DORA PATEL MD       Assessment:  1. Chronic pain of left knee    2. Knee effusion, left    3. Primary osteoarthritis of left knee        Plan:  Discussed the assessment with the patient.  Follow up: prn based on clinical progress  No instability on exam or by hx  Most sx are PF in nature, some lateral compartment as well  XR images independently visualized and reviewed with patient today in clinic  Imaging is reassuring, though there is some DJD present with marginal osteophytes in the PF compartment and narrowing of the lateral compartment, which are her sites of pain  Reviewed role of diet, exercise in pain mgmt  Relatively sedentary but has been increasing activity lately with resulting pain relief  Continue to increase low impact activity as tolerated, and walking is encouraged as well  Could consider CSI in the future, especially if her effusion does not resolve, or pain dramatically increases again  Continue voltaren gel, OTC tylenol prn for pain, could consider tumeric supplement as well  PT available if desired for HEP guidance  Compression sleeve use and options/strategies reviewed  We discussed modified progressive pain-free activity as tolerated  Home handouts provided and supportive care reviewed  All questions were answered today  Contact us with additional questions or concerns  Signs and sx of concern reviewed    Thanks very much for sending this  nice lady to us, I will keep you updated with her progress      Vinnie Kilgore DO, [facility]  Sports Medicine Physician  [facility]        Disclaimer: This note consists of symbols derived from keyboarding, dictation and/or voice recognition software. As a result, there may be errors in the script that have gone undetected. Please consider this when interpreting information found in this chart.        Again, thank you for allowing me to participate in the care of your patient.        Sincerely,        Vinnie Kilgore DO

## 2021-04-23 DIAGNOSIS — F41.1 GAD (GENERALIZED ANXIETY DISORDER): ICD-10-CM

## 2021-04-23 NOTE — TELEPHONE ENCOUNTER
Requested Prescriptions   Pending Prescriptions Disp Refills     ALPRAZolam (XANAX) 0.5 MG tablet [Pharmacy Med Name: ALPRAZOLAM 0.5MG TABLET] 60 tablet      Sig: TAKE 1 TABLET (0.5 MG) BY MOUTH 2 TIMES DAILY       There is no refill protocol information for this order

## 2021-04-26 RX ORDER — ALPRAZOLAM 0.5 MG
0.5 TABLET ORAL 2 TIMES DAILY
Qty: 60 TABLET | Refills: 5 | Status: SHIPPED | OUTPATIENT
Start: 2021-04-26 | End: 2021-11-17

## 2021-05-06 ENCOUNTER — TELEPHONE (OUTPATIENT)
Dept: INTERNAL MEDICINE | Facility: CLINIC | Age: 76
End: 2021-05-06

## 2021-05-06 NOTE — TELEPHONE ENCOUNTER
Patient Quality Outreach Summary      Summary:    Patient is due/failing the following:   BP check    Type of outreach:    Sent letter.    Questions for provider review:    None                                                                                                                    Marnie Aceves CMA (SIENNA)   (aka: Sara Aceves)       Chart routed to Care Team.

## 2021-05-06 NOTE — LETTER
Meryl Fournier  [address] [facility] [address]          05/06/21      Dear Meryl Fournier        Your most recent blood pressure reading preformed at our clinic was higher than we like to see it. The goal is to have it under 140/90 in clinic.    Please call our clinic [phone] (option 2) to schedule a blood pressure recheck with our RN staff. This appointment is free of charges and it takes about 15 minutes to be complete.     Be sure to take all of your blood pressure medications, and avoid stimulants like caffeine, cold medicines, sudafed, or tobacco prior to your recheck.    If your blood pressure medication was changed by your provider recently wait 2 weeks before making this recheck appointment.     Thank you for trusting us with your health care.    Sincerely,        [facility]/Quality team

## 2021-06-14 ENCOUNTER — TELEPHONE (OUTPATIENT)
Dept: FAMILY MEDICINE | Facility: CLINIC | Age: 76
End: 2021-06-14

## 2021-06-14 ENCOUNTER — ALLIED HEALTH/NURSE VISIT (OUTPATIENT)
Dept: FAMILY MEDICINE | Facility: CLINIC | Age: 76
End: 2021-06-14
Payer: COMMERCIAL

## 2021-06-14 VITALS — HEART RATE: 68 BPM | DIASTOLIC BLOOD PRESSURE: 76 MMHG | SYSTOLIC BLOOD PRESSURE: 144 MMHG

## 2021-06-14 DIAGNOSIS — I10 BENIGN ESSENTIAL HYPERTENSION: Primary | ICD-10-CM

## 2021-06-14 PROCEDURE — 99207 PR NO CHARGE NURSE ONLY: CPT

## 2021-06-14 NOTE — TELEPHONE ENCOUNTER
Meryl Fournier is a 75 year old patient who comes in today for a Blood Pressure check because of ongoing blood pressure monitoring.  Appears it was elevated to 158/73 at 3/23/21 visit with ortho. She was having some pain at that time, and nervous.   Vital Signs as repeated by RN today: 144/76; Pulse 68.  Patient is taking medication as prescribed, lisinopril-hydrochlorothiazide once daily.   Patient is tolerating medications well.  Patient is not monitoring Blood Pressure at home.    Current complaints: none  Patient sometimes has anxiety, manages with Xanax and relaxation techniques, which are effective. She does try to get physical activity at home, uses a Cubii machine when she's watching TV.   Disposition:  Forwarding to provider for review of slightly elevated BP.      Cheyanne Eason RN  [facility]

## 2021-06-14 NOTE — TELEPHONE ENCOUNTER
Patient was instructed to return call to [facility] main line at [phone] to speak with an RN.    Cheyanne Eason RN  [facility]

## 2021-06-14 NOTE — PROGRESS NOTES
Meryl Fournier is a 75 year old patient who comes in today for a Blood Pressure check because of ongoing blood pressure monitoring.  Appears it was elevated to 158/73 at 3/23/21 visit with ortho. She was having some pain at that time, and nervous.   Vital Signs as repeated by RN today: 144/76; Pulse 68.  Patient is taking medication as prescribed, lisinopril-hydrochlorothiazide once daily.   Patient is tolerating medications well.  Patient is not monitoring Blood Pressure at home.    Current complaints: none  Patient sometimes has anxiety, manages with Xanax and relaxation techniques, which are effective. She does try to get physical activity at home, uses a Cubii machine when she's watching TV.   Disposition:  Forwarding to provider for review of slightly elevated BP.     Cheyanne Eason RN  [facility]

## 2021-06-15 NOTE — TELEPHONE ENCOUNTER
Left a message for Meryl to return our call. CSS ok to give message and schedule appointment. Thank you!    Sulma Winters RN

## 2021-06-16 NOTE — TELEPHONE ENCOUNTER
3rd attempt to contact patient about her bp readings.  Will mail letter and close encounter. Carmen ROMERO RN

## 2021-06-25 ENCOUNTER — VIRTUAL VISIT (OUTPATIENT)
Dept: FAMILY MEDICINE | Facility: CLINIC | Age: 76
End: 2021-06-25
Payer: COMMERCIAL

## 2021-06-25 DIAGNOSIS — F41.1 GAD (GENERALIZED ANXIETY DISORDER): ICD-10-CM

## 2021-06-25 DIAGNOSIS — N18.4 CKD (CHRONIC KIDNEY DISEASE) STAGE 4, GFR 15-29 ML/MIN (H): ICD-10-CM

## 2021-06-25 DIAGNOSIS — I10 BENIGN ESSENTIAL HYPERTENSION: Primary | Chronic | ICD-10-CM

## 2021-06-25 PROCEDURE — 99214 OFFICE O/P EST MOD 30 MIN: CPT | Mod: 95 | Performed by: INTERNAL MEDICINE

## 2021-06-25 RX ORDER — LISINOPRIL AND HYDROCHLOROTHIAZIDE 12.5; 2 MG/1; MG/1
2 TABLET ORAL DAILY
Qty: 180 TABLET | Refills: 3 | Status: SHIPPED | OUTPATIENT
Start: 2021-06-25 | End: 2022-04-07

## 2021-06-25 NOTE — PROGRESS NOTES
"Meryl is a 75 year old who is being evaluated via a billable telephone visit.      What phone number would you like to be contacted at? [phone]  How would you like to obtain your AVS? Mail a copy    Assessment & Plan   Problem List Items Addressed This Visit     Benign essential hypertension - Primary (Chronic)    Relevant Medications    lisinopril-hydrochlorothiazide (ZESTORETIC) 20-12.5 MG tablet    Other Relevant Orders    **Basic metabolic panel FUTURE 14d    CKD (chronic kidney disease) stage 4, GFR 15-29 ml/min (H)    Relevant Medications    lisinopril-hydrochlorothiazide (ZESTORETIC) 20-12.5 MG tablet    Other Relevant Orders    **Basic metabolic panel FUTURE 14d    CBC with platelets    BRISSA (generalized anxiety disorder)                    BMI:   Estimated body mass index is 31.12 kg/m  as calculated from the following:    Height as of 3/23/21: 1.651 m (5' 5\").    Weight as of 3/23/21: 84.8 kg (187 lb).       Patient Instructions   1. Increase lisinopril/HCTZ to 2 pills once daily OR 1 pill twice daily - whichever you prefer  2. RN blood pressure check and non-fasting blood work in 2 weeks  3. If anxiety is not improved but blood pressure is better, then we should touch base to discuss anxiety      No follow-ups on file.    Nora Hernandez, [facility]    Subjective   Meryl is a 75 year old who presents for the following health issues     HPI     Hypertension Follow-up      Do you check your blood pressure regularly outside of the clinic? No.     Are you following a low salt diet? Yes    Are your blood pressures ever more than 140 on the top number (systolic) OR more   than 90 on the bottom number (diastolic), for example 140/90? Yes      How many servings of fruits and vegetables do you eat daily?  4 or more    On average, how many sweetened beverages do you drink each day (Examples: soda, juice, sweet tea, etc.  Do NOT count diet or artificially sweetened beverages)?   " 0    How many days per week do you exercise enough to make your he2art beat faster? 5    How many minutes a day do you exercise enough to make your heart beat faster? 20 - 29    How many days per week do you miss taking your medication? 0    Lisinopril/HCTZ 20/12.5 daily    Previously on metoprolol    BP Readings from Last 6 Encounters:   06/14/21 (!) 144/76   03/23/21 (!) 158/73   02/18/21 126/62   01/16/20 136/62   12/10/19 (!) 142/74   11/18/19 133/65     Anxiety:  --feels is worse and this may be causing the high blood pressure   --using xanax 1 x day; recently BID. Have tried multiple other anxiety medications w/out succuss    CKD-4: patient has declined regular nephrology follow-up      Review of Systems   Constitutional, HEENT, cardiovascular, pulmonary, gi and gu systems are negative, except as otherwise noted.      Objective           Vitals:  No vitals were obtained today due to virtual visit.    Physical Exam   healthy, alert and no distress  PSYCH: Alert and oriented times 3; coherent speech, normal   rate and volume, able to articulate logical thoughts, able   to abstract reason, no tangential thoughts, no hallucinations   or delusions  Her affect is normal  RESP: No cough, no audible wheezing, able to talk in full sentences  Remainder of exam unable to be completed due to telephone visits                Phone call duration: 12 minutes

## 2021-06-25 NOTE — PATIENT INSTRUCTIONS
1. Increase lisinopril/HCTZ to 2 pills once daily OR 1 pill twice daily - whichever you prefer  2. RN blood pressure check and non-fasting blood work in 2 weeks  3. If anxiety is not improved but blood pressure is better, then we should touch base to discuss anxiety

## 2021-07-08 DIAGNOSIS — K21.9 GASTROESOPHAGEAL REFLUX DISEASE WITHOUT ESOPHAGITIS: ICD-10-CM

## 2021-07-08 RX ORDER — FAMOTIDINE 40 MG/1
TABLET, FILM COATED ORAL
Qty: 90 TABLET | Refills: 3 | Status: SHIPPED | OUTPATIENT
Start: 2021-07-08 | End: 2022-05-25

## 2021-07-26 ENCOUNTER — LAB (OUTPATIENT)
Dept: LAB | Facility: CLINIC | Age: 76
End: 2021-07-26
Payer: COMMERCIAL

## 2021-07-26 ENCOUNTER — ALLIED HEALTH/NURSE VISIT (OUTPATIENT)
Dept: FAMILY MEDICINE | Facility: CLINIC | Age: 76
End: 2021-07-26
Payer: COMMERCIAL

## 2021-07-26 ENCOUNTER — TELEPHONE (OUTPATIENT)
Dept: FAMILY MEDICINE | Facility: CLINIC | Age: 76
End: 2021-07-26

## 2021-07-26 VITALS — HEART RATE: 64 BPM | DIASTOLIC BLOOD PRESSURE: 72 MMHG | SYSTOLIC BLOOD PRESSURE: 138 MMHG

## 2021-07-26 DIAGNOSIS — N18.4 CKD (CHRONIC KIDNEY DISEASE) STAGE 4, GFR 15-29 ML/MIN (H): ICD-10-CM

## 2021-07-26 DIAGNOSIS — I10 BENIGN ESSENTIAL HYPERTENSION: Chronic | ICD-10-CM

## 2021-07-26 DIAGNOSIS — I10 BENIGN ESSENTIAL HYPERTENSION: Primary | ICD-10-CM

## 2021-07-26 LAB
ANION GAP SERPL CALCULATED.3IONS-SCNC: 4 MMOL/L (ref 3–14)
BUN SERPL-MCNC: 38 MG/DL (ref 7–30)
CALCIUM SERPL-MCNC: 9.1 MG/DL (ref 8.5–10.1)
CHLORIDE BLD-SCNC: 108 MMOL/L (ref 94–109)
CO2 SERPL-SCNC: 26 MMOL/L (ref 20–32)
CREAT SERPL-MCNC: 1.98 MG/DL (ref 0.52–1.04)
ERYTHROCYTE [DISTWIDTH] IN BLOOD BY AUTOMATED COUNT: 12.6 % (ref 10–15)
GFR SERPL CREATININE-BSD FRML MDRD: 24 ML/MIN/1.73M2
GLUCOSE BLD-MCNC: 107 MG/DL (ref 70–99)
HCT VFR BLD AUTO: 37.8 % (ref 35–47)
HGB BLD-MCNC: 12.3 G/DL (ref 11.7–15.7)
MCH RBC QN AUTO: 29.9 PG (ref 26.5–33)
MCHC RBC AUTO-ENTMCNC: 32.5 G/DL (ref 31.5–36.5)
MCV RBC AUTO: 92 FL (ref 78–100)
PLATELET # BLD AUTO: 241 10E3/UL (ref 150–450)
POTASSIUM BLD-SCNC: 4.3 MMOL/L (ref 3.4–5.3)
RBC # BLD AUTO: 4.11 10E6/UL (ref 3.8–5.2)
SODIUM SERPL-SCNC: 138 MMOL/L (ref 133–144)
WBC # BLD AUTO: 10.4 10E3/UL (ref 4–11)

## 2021-07-26 PROCEDURE — 85027 COMPLETE CBC AUTOMATED: CPT

## 2021-07-26 PROCEDURE — 80048 BASIC METABOLIC PNL TOTAL CA: CPT

## 2021-07-26 PROCEDURE — 36415 COLL VENOUS BLD VENIPUNCTURE: CPT

## 2021-07-26 PROCEDURE — 99207 PR NO CHARGE NURSE ONLY: CPT

## 2021-07-26 NOTE — TELEPHONE ENCOUNTER
"Meryl Fournier is a 75 year old patient who comes in today for a Blood Pressure check because of medication change and ongoing blood pressure monitoring. Lisinopril-hydrochlorothiazide increased.  She's taking one pill twice daily.  Patient also had BMP labs drawn today-results in process.   Vital Signs as repeated by RN today: 138/72; pulse 64.   Patient is taking medication as prescribed.  Patient is tolerating medications well.  Patient reports being more tired in the afternoon, but she always takes naps around 2pm anyway, so isn't bothersome. She reports the increase in med has been good overall, as she feels less \"buzzy\" and anxious.   Patient is not monitoring Blood Pressure at home.    Current complaints: none  Disposition:  Patient to continue current medications. Forwarding to provider, and patient advised we'll call with lab results and any further recommendations.     Cheyanne Eason RN  [facility]      "

## 2021-07-26 NOTE — PROGRESS NOTES
"Meryl Fournier is a 75 year old patient who comes in today for a Blood Pressure check because of medication change and ongoing blood pressure monitoring. Lisinopril-hydrochlorothiazide increased.  She's taking one pill twice daily.  Patient also had BMP labs drawn today-results in process.   Vital Signs as repeated by RN today: 138/72; pulse 64.   Patient is taking medication as prescribed.  Patient is tolerating medications well.  Patient reports being more tired in the afternoon, but she always takes naps around 2pm anyway, so isn't bothersome. She reports the increase in med has been good overall, as she feels less \"buzzy\" and anxious.   Patient is not monitoring Blood Pressure at home.    Current complaints: none  Disposition:  Patient to continue current medications. Forwarding to provider, and patient advised we'll call with lab results and any further recommendations.     Cheyanne Eason RN  [facility]    "

## 2021-07-26 NOTE — TELEPHONE ENCOUNTER
Blood pressure is better but kidney function is lower.  Recommend to hydrate well and recheck in 1 week.  If kidney function remains low, would need to alter blood pressure meds

## 2021-07-27 NOTE — TELEPHONE ENCOUNTER
See result notes-patient already notified by this RN and scheduled for f/u lab appt.     Cheyanne Eason RN  [facility]

## 2021-08-03 ENCOUNTER — LAB (OUTPATIENT)
Dept: LAB | Facility: CLINIC | Age: 76
End: 2021-08-03
Payer: COMMERCIAL

## 2021-08-03 DIAGNOSIS — I10 BENIGN ESSENTIAL HYPERTENSION: ICD-10-CM

## 2021-08-03 LAB
ANION GAP SERPL CALCULATED.3IONS-SCNC: 4 MMOL/L (ref 3–14)
BUN SERPL-MCNC: 24 MG/DL (ref 7–30)
CALCIUM SERPL-MCNC: 9.2 MG/DL (ref 8.5–10.1)
CHLORIDE BLD-SCNC: 102 MMOL/L (ref 94–109)
CO2 SERPL-SCNC: 28 MMOL/L (ref 20–32)
CREAT SERPL-MCNC: 1.76 MG/DL (ref 0.52–1.04)
GFR SERPL CREATININE-BSD FRML MDRD: 28 ML/MIN/1.73M2
GLUCOSE BLD-MCNC: 97 MG/DL (ref 70–99)
POTASSIUM BLD-SCNC: 4.4 MMOL/L (ref 3.4–5.3)
SODIUM SERPL-SCNC: 134 MMOL/L (ref 133–144)

## 2021-08-03 PROCEDURE — 80048 BASIC METABOLIC PNL TOTAL CA: CPT

## 2021-08-03 PROCEDURE — 36415 COLL VENOUS BLD VENIPUNCTURE: CPT

## 2021-11-16 DIAGNOSIS — F41.1 GAD (GENERALIZED ANXIETY DISORDER): ICD-10-CM

## 2021-11-16 NOTE — TELEPHONE ENCOUNTER
Routing refill request to provider for review/approval because:  Drug not on the [facility] refill protocol     June Woodard RN

## 2021-11-17 RX ORDER — ALPRAZOLAM 0.5 MG
0.5 TABLET ORAL 2 TIMES DAILY
Qty: 60 TABLET | Refills: 4 | Status: SHIPPED | OUTPATIENT
Start: 2021-11-17 | End: 2022-05-25

## 2022-01-06 DIAGNOSIS — E03.9 ACQUIRED HYPOTHYROIDISM: ICD-10-CM

## 2022-01-07 RX ORDER — LEVOTHYROXINE SODIUM 88 UG/1
88 TABLET ORAL DAILY
Qty: 90 TABLET | Refills: 0 | Status: SHIPPED | OUTPATIENT
Start: 2022-01-07 | End: 2022-01-18

## 2022-01-17 DIAGNOSIS — E03.9 ACQUIRED HYPOTHYROIDISM: ICD-10-CM

## 2022-01-18 RX ORDER — LEVOTHYROXINE SODIUM 88 UG/1
88 TABLET ORAL DAILY
Qty: 90 TABLET | Refills: 0 | Status: SHIPPED | OUTPATIENT
Start: 2022-01-18 | End: 2022-04-18

## 2022-05-25 ENCOUNTER — TELEPHONE (OUTPATIENT)
Dept: FAMILY MEDICINE | Facility: CLINIC | Age: 77
End: 2022-05-25

## 2022-05-25 ENCOUNTER — OFFICE VISIT (OUTPATIENT)
Dept: FAMILY MEDICINE | Facility: CLINIC | Age: 77
End: 2022-05-25
Payer: COMMERCIAL

## 2022-05-25 VITALS
HEART RATE: 65 BPM | BODY MASS INDEX: 29.72 KG/M2 | HEIGHT: 65 IN | RESPIRATION RATE: 16 BRPM | SYSTOLIC BLOOD PRESSURE: 150 MMHG | DIASTOLIC BLOOD PRESSURE: 80 MMHG | TEMPERATURE: 97.6 F | OXYGEN SATURATION: 98 % | WEIGHT: 178.4 LBS

## 2022-05-25 DIAGNOSIS — Z78.0 MENOPAUSE: ICD-10-CM

## 2022-05-25 DIAGNOSIS — Z00.00 ENCOUNTER FOR MEDICARE ANNUAL WELLNESS EXAM: Primary | ICD-10-CM

## 2022-05-25 DIAGNOSIS — I10 BENIGN ESSENTIAL HYPERTENSION: Chronic | ICD-10-CM

## 2022-05-25 DIAGNOSIS — R00.2 PALPITATIONS: ICD-10-CM

## 2022-05-25 DIAGNOSIS — N25.81 SECONDARY RENAL HYPERPARATHYROIDISM (H): ICD-10-CM

## 2022-05-25 DIAGNOSIS — F41.1 GAD (GENERALIZED ANXIETY DISORDER): ICD-10-CM

## 2022-05-25 DIAGNOSIS — K21.9 GASTROESOPHAGEAL REFLUX DISEASE WITHOUT ESOPHAGITIS: ICD-10-CM

## 2022-05-25 DIAGNOSIS — N18.4 CKD (CHRONIC KIDNEY DISEASE) STAGE 4, GFR 15-29 ML/MIN (H): ICD-10-CM

## 2022-05-25 DIAGNOSIS — Z13.220 SCREENING FOR HYPERLIPIDEMIA: ICD-10-CM

## 2022-05-25 DIAGNOSIS — E03.9 ACQUIRED HYPOTHYROIDISM: ICD-10-CM

## 2022-05-25 LAB
ANION GAP SERPL CALCULATED.3IONS-SCNC: 5 MMOL/L (ref 3–14)
BUN SERPL-MCNC: 24 MG/DL (ref 7–30)
CALCIUM SERPL-MCNC: 9.8 MG/DL (ref 8.5–10.1)
CHLORIDE BLD-SCNC: 101 MMOL/L (ref 94–109)
CHOLEST SERPL-MCNC: 241 MG/DL
CO2 SERPL-SCNC: 26 MMOL/L (ref 20–32)
CREAT SERPL-MCNC: 1.68 MG/DL (ref 0.52–1.04)
CREAT UR-MCNC: 55 MG/DL
DEPRECATED CALCIDIOL+CALCIFEROL SERPL-MC: 61 UG/L (ref 20–75)
ERYTHROCYTE [DISTWIDTH] IN BLOOD BY AUTOMATED COUNT: 12.1 % (ref 10–15)
FASTING STATUS PATIENT QL REPORTED: YES
GFR SERPL CREATININE-BSD FRML MDRD: 31 ML/MIN/1.73M2
GLUCOSE BLD-MCNC: 96 MG/DL (ref 70–99)
HCT VFR BLD AUTO: 38.4 % (ref 35–47)
HDLC SERPL-MCNC: 51 MG/DL
HGB BLD-MCNC: 12.4 G/DL (ref 11.7–15.7)
LDLC SERPL CALC-MCNC: 155 MG/DL
MCH RBC QN AUTO: 29.8 PG (ref 26.5–33)
MCHC RBC AUTO-ENTMCNC: 32.3 G/DL (ref 31.5–36.5)
MCV RBC AUTO: 92 FL (ref 78–100)
MICROALBUMIN UR-MCNC: 11 MG/L
MICROALBUMIN/CREAT UR: 20 MG/G CR (ref 0–25)
NONHDLC SERPL-MCNC: 190 MG/DL
PLATELET # BLD AUTO: 297 10E3/UL (ref 150–450)
POTASSIUM BLD-SCNC: 5.1 MMOL/L (ref 3.4–5.3)
PTH-INTACT SERPL-MCNC: 79 PG/ML (ref 18–80)
RBC # BLD AUTO: 4.16 10E6/UL (ref 3.8–5.2)
SODIUM SERPL-SCNC: 132 MMOL/L (ref 133–144)
TRIGL SERPL-MCNC: 176 MG/DL
TSH SERPL DL<=0.005 MIU/L-ACNC: 2.38 MU/L (ref 0.4–4)
WBC # BLD AUTO: 11.6 10E3/UL (ref 4–11)

## 2022-05-25 PROCEDURE — 83970 ASSAY OF PARATHORMONE: CPT | Performed by: INTERNAL MEDICINE

## 2022-05-25 PROCEDURE — 80048 BASIC METABOLIC PNL TOTAL CA: CPT | Performed by: INTERNAL MEDICINE

## 2022-05-25 PROCEDURE — 36415 COLL VENOUS BLD VENIPUNCTURE: CPT | Performed by: INTERNAL MEDICINE

## 2022-05-25 PROCEDURE — 91305 COVID-19,PF,PFIZER (12+ YRS): CPT | Performed by: INTERNAL MEDICINE

## 2022-05-25 PROCEDURE — 0054A COVID-19,PF,PFIZER (12+ YRS): CPT | Performed by: INTERNAL MEDICINE

## 2022-05-25 PROCEDURE — 99397 PER PM REEVAL EST PAT 65+ YR: CPT | Mod: 25 | Performed by: INTERNAL MEDICINE

## 2022-05-25 PROCEDURE — 93000 ELECTROCARDIOGRAM COMPLETE: CPT | Performed by: INTERNAL MEDICINE

## 2022-05-25 PROCEDURE — 82043 UR ALBUMIN QUANTITATIVE: CPT | Performed by: INTERNAL MEDICINE

## 2022-05-25 PROCEDURE — 85027 COMPLETE CBC AUTOMATED: CPT | Performed by: INTERNAL MEDICINE

## 2022-05-25 PROCEDURE — 84443 ASSAY THYROID STIM HORMONE: CPT | Performed by: INTERNAL MEDICINE

## 2022-05-25 PROCEDURE — 99214 OFFICE O/P EST MOD 30 MIN: CPT | Mod: 25 | Performed by: INTERNAL MEDICINE

## 2022-05-25 PROCEDURE — 82306 VITAMIN D 25 HYDROXY: CPT | Performed by: INTERNAL MEDICINE

## 2022-05-25 PROCEDURE — 80061 LIPID PANEL: CPT | Performed by: INTERNAL MEDICINE

## 2022-05-25 RX ORDER — FAMOTIDINE 40 MG/1
40 TABLET, FILM COATED ORAL AT BEDTIME
Qty: 90 TABLET | Refills: 3 | Status: SHIPPED | OUTPATIENT
Start: 2022-05-25 | End: 2023-06-13

## 2022-05-25 RX ORDER — ALPRAZOLAM 0.5 MG
0.5 TABLET ORAL 2 TIMES DAILY
Qty: 60 TABLET | Refills: 5 | Status: SHIPPED | OUTPATIENT
Start: 2022-05-25 | End: 2023-01-24

## 2022-05-25 RX ORDER — LEVOTHYROXINE SODIUM 88 UG/1
88 TABLET ORAL DAILY
Qty: 90 TABLET | Refills: 3 | Status: SHIPPED | OUTPATIENT
Start: 2022-05-25 | End: 2023-06-13

## 2022-05-25 RX ORDER — LISINOPRIL AND HYDROCHLOROTHIAZIDE 12.5; 2 MG/1; MG/1
2 TABLET ORAL DAILY
Qty: 180 TABLET | Refills: 3 | Status: SHIPPED | OUTPATIENT
Start: 2022-05-25 | End: 2023-06-13

## 2022-05-25 RX ORDER — OMEGA-3 FATTY ACIDS/FISH OIL 300-1000MG
CAPSULE ORAL DAILY
COMMUNITY
End: 2024-06-19

## 2022-05-25 RX ORDER — CARVEDILOL 3.12 MG/1
3.12 TABLET ORAL 2 TIMES DAILY WITH MEALS
Qty: 180 TABLET | Refills: 3 | Status: SHIPPED | OUTPATIENT
Start: 2022-05-25 | End: 2022-06-08

## 2022-05-25 ASSESSMENT — ENCOUNTER SYMPTOMS
PARESTHESIAS: 0
DYSURIA: 0
FREQUENCY: 0
ABDOMINAL PAIN: 0
BREAST MASS: 0
COUGH: 1
CONSTIPATION: 0
SORE THROAT: 0
DIZZINESS: 0
JOINT SWELLING: 0
NAUSEA: 0
ARTHRALGIAS: 1
EYE PAIN: 0
FEVER: 0
MYALGIAS: 0
PALPITATIONS: 1
WEAKNESS: 0
HEARTBURN: 0
SHORTNESS OF BREATH: 1
HEMATURIA: 0
NERVOUS/ANXIOUS: 0
DIARRHEA: 0
CHILLS: 1
HEMATOCHEZIA: 0
HEADACHES: 0

## 2022-05-25 ASSESSMENT — PAIN SCALES - GENERAL: PAINLEVEL: NO PAIN (0)

## 2022-05-25 ASSESSMENT — ACTIVITIES OF DAILY LIVING (ADL): CURRENT_FUNCTION: NO ASSISTANCE NEEDED

## 2022-05-25 NOTE — PROGRESS NOTES
"SUBJECTIVE:   Meryl Fournier is a 76 year old female who presents for Preventive Visit.      Patient has been advised of split billing requirements and indicates understanding: Yes  Are you in the first 12 months of your Medicare coverage?  No    Healthy Habits:     In general, how would you rate your overall health?  Fair    Frequency of exercise:  6-7 days/week    Duration of exercise:  30-45 minutes    Do you usually eat at least 4 servings of fruit and vegetables a day, include whole grains    & fiber and avoid regularly eating high fat or \"junk\" foods?  Yes    Taking medications regularly:  Yes    Medication side effects:  None    Ability to successfully perform activities of daily living:  No assistance needed    Home Safety:  No safety concerns identified    Hearing Impairment:  Difficulty following a conversation in a noisy restaurant or crowded room, feel that people are mumbling or not speaking clearly, difficulty following dialogue in the theater, difficult to understand a speaker at a public meeting or Mosque service, need to ask people to speak up or repeat themselves, find that men's voices are easier to understand than woman's, difficulty understanding soft or whispered speech and difficulty understanding speech on the telephone    In the past 6 months, have you been bothered by leaking of urine?  No    In general, how would you rate your overall mental or emotional health?  Good      PHQ-2 Total Score: 1    Additional concerns today:  Yes    Bunion:  --on right foot;  --using gel toe dividers which helps  --wonders what else can be done    Palpitations:  --sometimes feels heart is racing or pounding; thinks is related to indigestion; is on famotidine  --sometimes thinks heart is irregular  --episodes of palpitations occur 1-2 x week;  Episodes can last a few min to up to 1 hour; sometimes there is associated dull chest pain; no associated shortness of breath, dizziness; no syncope  --no chest " pain with exertion;  Sometimes feels a bit more winded with mowing the lawn; will slow down or stand still and breathing returns to normal quickly.  --often has days of not feeling well, fatigued, run down; sleeping all day;  No other specific symptoms   --often feels exhausted after eating      CKD-4:  --GFR in mid-upper 20s.  --patient has declined regular nephrology follow-up       Hypertension:  --previously on metoprolol, stopped 2018 due to fatigue  --terazosin - Stopped by nephrologist due to bilateral ankle edema per pt report.   --on amlodipine 5 mg, stopped 10/2006  --nifedipine caused leg swelling.  --doesn't have current leg swelling;  Has occ shortness of breath with exertion  --son had to have 3v CABG, pacer, defib placed in the last 1 Year.    Chief Complaint   Patient presents with     Physical     foot     Right bunion      Health Maintenance     Will do Covid shot today        Do you feel safe in your environment? Yes    Have you ever done Advance Care Planning? (For example, a Health Directive, POLST, or a discussion with a medical provider or your loved ones about your wishes): Yes, advance care planning is on file.       Fall risk  Fallen 2 or more times in the past year?: No  Any fall with injury in the past year?: No    Cognitive Screening   1) Repeat 3 items (Leader, Season, Table)    2) Clock draw: NORMAL  3) 3 item recall: Recalls 1 object   Results: NORMAL clock, 1-2 items recalled: COGNITIVE IMPAIRMENT LESS LIKELY    Mini-CogTM Copyright S Travis. Licensed by the author for use in [facility]; reprinted with permission ([email or web address]@.[email or web address]). All rights reserved.      Do you have sleep apnea, excessive snoring or daytime drowsiness?: yes    Reviewed and updated as needed this visit by clinical staff   Tobacco  Allergies  Meds   Med Hx  Surg Hx  Fam Hx  Soc Hx          Reviewed and updated as needed this visit by Provider                   Social History     Tobacco Use      Smoking status: Former Smoker     Packs/day: 0.50     Years: 51.00     Pack years: 25.50     Types: Cigarettes     Start date: 1963     Quit date: 2014     Years since quittin.8     Smokeless tobacco: Never Used     Tobacco comment: using E-cigs   Substance Use Topics     Alcohol use: No     If you drink alcohol do you typically have >3 drinks per day or >7 drinks per week? No    Alcohol Use 2022   Prescreen: >3 drinks/day or >7 drinks/week? No   Prescreen: >3 drinks/day or >7 drinks/week? -   No flowsheet data found.      Hypertension Follow-up      Do you check your blood pressure regularly outside of the clinic? No     Are you following a low salt diet? Yes    Are your blood pressures ever more than 140 on the top number (systolic) OR more   than 90 on the bottom number (diastolic), for example 140/90? No    Anxiety Follow-Up    How are you doing with your anxiety since your last visit? No change    Are you having other symptoms that might be associated with anxiety? Yes:  family     Have you had a significant life event? OTHER: family     Are you feeling depressed? No    Do you have any concerns with your use of alcohol or other drugs? No    Social History     Tobacco Use     Smoking status: Former Smoker     Packs/day: 0.50     Years: 51.00     Pack years: 25.50     Types: Cigarettes     Start date: 1963     Quit date: 2014     Years since quittin.8     Smokeless tobacco: Never Used     Tobacco comment: using E-cigs   Vaping Use     Vaping Use: Never used   Substance Use Topics     Alcohol use: No     Drug use: No     BRISSA-7 SCORE 2017   Total Score - - -   Total Score 6 3 15     PHQ 2018   PHQ-9 Total Score 8 7 7   Q9: Thoughts of better off dead/self-harm past 2 weeks Not at all Not at all Not at all     Last PHQ-9 2019   1.  Little interest or pleasure in doing things 1   2.  Feeling down, depressed, or hopeless 1   3.  Trouble  falling or staying asleep, or sleeping too much 0   4.  Feeling tired or having little energy 3   5.  Poor appetite or overeating 1   6.  Feeling bad about yourself 1   7.  Trouble concentrating 0   8.  Moving slowly or restless 0   Q9: Thoughts of better off dead/self-harm past 2 weeks 0   PHQ-9 Total Score 7   Difficulty at work, home, or with people -     BRISSA-7  2/7/2019   1. Feeling nervous, anxious, or on edge 3   2. Not being able to stop or control worrying 3   3. Worrying too much about different things 3   4. Trouble relaxing 3   5. Being so restless that it is hard to sit still 1   6. Becoming easily annoyed or irritable 1   7. Feeling afraid, as if something awful might happen 1   BRISSA-7 Total Score 15   If you checked any problems, how difficult have they made it for you to do your work, take care of things at home, or get along with other people? Somewhat difficult       Hypothyroidism Follow-up      Since last visit, patient describes the following symptoms: Weight stable, no hair loss, no skin changes, no constipation, no loose stools, anxiety, fatigue and hair loss      Current providers sharing in care for this patient include:   Patient Care Team:  Nora Hernandez DO as PCP - General (Internal Medicine)  Nora Hernandez DO as Assigned PCP  Vinnie Kilgore DO as Assigned Musculoskeletal Provider    The following health maintenance items are reviewed in Epic and correct as of today:  Health Maintenance Due   Topic Date Due     ANNUAL REVIEW OF  ORDERS  Never done     LUNG CANCER SCREENING  Never done     MICROALBUMIN  08/18/2021     BMP  11/03/2021     DEXA  12/04/2021     LIPID  02/18/2022     TSH W/FREE T4 REFLEX  02/18/2022     HEMOGLOBIN  01/26/2022     Current Outpatient Medications   Medication Sig Dispense Refill     ALPRAZolam (XANAX) 0.5 MG tablet TAKE 1 TABLET (0.5 MG) BY MOUTH 2 TIMES DAILY 60 tablet 4     aspirin 81 MG tablet Take  by mouth daily.  3      "cholecalciferol 5000 UNITS CAPS Take 1 tablet by mouth daily. 30 capsule      Coenzyme Q10 (COQ10 PO) Take 100 mg by mouth       Cranberry 1000 MG CAPS        famotidine (PEPCID) 40 MG tablet TAKE 1 TABLET BY MOUTH DAILY AT BEDTIME 90 tablet 3     levothyroxine (SYNTHROID/LEVOTHROID) 88 MCG tablet TAKE 1 TABLET (88 MCG) BY MOUTH DAILY 90 tablet 0     lisinopril-hydrochlorothiazide (ZESTORETIC) 20-12.5 MG tablet TAKE 2 TABLETS BY MOUTH DAILY 180 tablet 1     omega 3 1000 MG CAPS Take by mouth daily       Turmeric 500 MG CAPS        UNABLE TO FIND MEDICATION NAME: 8 greens gummy.             Pertinent mammograms are reviewed under the imaging tab.    Review of Systems   Constitutional: Positive for chills. Negative for fever.   HENT: Positive for hearing loss. Negative for congestion, ear pain and sore throat.    Eyes: Negative for pain and visual disturbance.   Respiratory: Positive for cough and shortness of breath.    Cardiovascular: Positive for chest pain and palpitations. Negative for peripheral edema.   Gastrointestinal: Negative for abdominal pain, constipation, diarrhea, heartburn, hematochezia and nausea.   Breasts:  Negative for tenderness, breast mass and discharge.   Genitourinary: Negative for dysuria, frequency, genital sores, hematuria, pelvic pain, urgency, vaginal bleeding and vaginal discharge.   Musculoskeletal: Positive for arthralgias. Negative for joint swelling and myalgias.   Skin: Negative for rash.   Neurological: Negative for dizziness, weakness, headaches and paresthesias.   Psychiatric/Behavioral: Negative for mood changes. The patient is not nervous/anxious.          OBJECTIVE:   BP (!) 150/80 (BP Location: Right arm, Patient Position: Sitting, Cuff Size: Adult Regular)   Pulse 65   Temp 97.6  F (36.4  C) (Tympanic)   Resp 16   Ht 1.65 m (5' 4.96\")   Wt 80.9 kg (178 lb 6.4 oz)   SpO2 98%   BMI 29.72 kg/m   Estimated body mass index is 29.72 kg/m  as calculated from the " "following:    Height as of this encounter: 1.65 m (5' 4.96\").    Weight as of this encounter: 80.9 kg (178 lb 6.4 oz).  Physical Exam  GENERAL: healthy, alert and no distress  EYES: Eyes grossly normal to inspection, PERRL and conjunctivae and sclerae normal  HENT: ear canals and TM's normal, nose and mouth without ulcers or lesions  NECK: no adenopathy, no asymmetry, masses, or scars and thyroid normal to palpation  RESP: lungs clear to auscultation - no rales, rhonchi or wheezes  CV: regular rate and rhythm, normal S1 S2, no S3 or S4, no murmur, click or rub, no peripheral edema and peripheral pulses strong  ABDOMEN: soft, nontender, no hepatosplenomegaly, no masses and bowel sounds normal  MS: bunion of great toe of right foot  SKIN: no suspicious lesions or rashes  NEURO: Normal strength and tone, mentation intact and speech normal  PSYCH: mentation appears normal, affect normal/bright    Diagnostic Test Results:  Labs reviewed in [facility]  EKG - NSR    ASSESSMENT / PLAN:       ICD-10-CM    1. Encounter for Medicare annual wellness exam  Z00.00    2. CKD (chronic kidney disease) stage 4, GFR 15-29 ml/min (H)  N18.4 Albumin Random Urine Quantitative with Creat Ratio     BASIC METABOLIC PANEL     DEXA HIP/PELVIS/SPINE - Future     lisinopril-hydrochlorothiazide (ZESTORETIC) 20-12.5 MG tablet     OFFICE/OUTPT VISIT,EST,LEVL IV     CBC with platelets     Vitamin D Deficiency     Parathyroid Hormone Intact     EKG 12-lead complete w/read - Clinics   3. Secondary renal hyperparathyroidism (H)  N25.81 OFFICE/OUTPT VISIT,EST,LEVL IV   4. Benign essential hypertension  I10 lisinopril-hydrochlorothiazide (ZESTORETIC) 20-12.5 MG tablet     OFFICE/OUTPT VISIT,EST,LEVL IV     EKG 12-lead complete w/read - Clinics     carvedilol (COREG) 3.125 MG tablet   5. Acquired hypothyroidism  E03.9 levothyroxine (SYNTHROID/LEVOTHROID) 88 MCG tablet     OFFICE/OUTPT VISIT,EST,LEVL IV     TSH with free T4 reflex   6. Gastroesophageal reflux " "disease without esophagitis  K21.9 famotidine (PEPCID) 40 MG tablet     OFFICE/OUTPT VISIT,EST,LEVL IV   7. BRISSA (generalized anxiety disorder)  F41.1 ALPRAZolam (XANAX) 0.5 MG tablet     OFFICE/OUTPT VISIT,EST,LEVL IV   8. Menopause  Z78.0 DEXA HIP/PELVIS/SPINE - Future     OFFICE/OUTPT VISIT,EST,LEVL IV   9. Screening for hyperlipidemia  Z13.220 Lipid panel reflex to direct LDL Fasting     OFFICE/OUTPT VISIT,EST,LEVL IV   10. Palpitations  R00.2 OFFICE/OUTPT VISIT,EST,LEVL IV     Adult Leadless EKG Monitor 3 to 7 Days     Bunion:  1. Discussed cause and treatment options.  Main treatment options are good supportive shoes, inserts, orthotics, etc.  Next treatment option is surgery;  you declined referral to podiatry, if you change your mid, let Dr. Hernandez know    Heart pounding symptoms:  1. Blood work today  2. EKG today  3. Recommend 7 day heart monitor.  Please call [phone] to schedule.   4. If monitor is normal, would recommend stress test    Chronic Kidney disease:  1. Would recommend consultation with kidney doctor but you declined;  if you change your mind, let DR. Hernandez know.    Hypertension:  1. Continue current medications.  Start carvedilol 3.125 mg twice daily  2. RN blood pressure check in 2-4 weeks.    Health Care Maintenance:  1. Radiology test was ordered - DEXA.  Please call [phone] to schedule.      Patient has been advised of split billing requirements and indicates understanding: Yes    COUNSELING:  Reviewed preventive health counseling, as reflected in patient instructions    Estimated body mass index is 29.72 kg/m  as calculated from the following:    Height as of this encounter: 1.65 m (5' 4.96\").    Weight as of this encounter: 80.9 kg (178 lb 6.4 oz).    Weight management plan: Discussed healthy diet and exercise guidelines    She reports that she quit smoking about 7 years ago. Her smoking use included cigarettes. She started smoking about 59 years ago. She has a 25.50 pack-year " smoking history. She has never used smokeless tobacco.      Appropriate preventive services were discussed with this patient, including applicable screening as appropriate for cardiovascular disease, diabetes, osteopenia/osteoporosis, and glaucoma.  As appropriate for age/gender, discussed screening for colorectal cancer, prostate cancer, breast cancer, and cervical cancer. Checklist reviewing preventive services available has been given to the patient.    Reviewed patients plan of care and provided an AVS. The Complex Care Plan (for patients with higher acuity and needing more deliberate coordination of services) for Meryl meets the Care Plan requirement. This Care Plan has been established and reviewed with the Patient.    Counseling Resources:  ATP IV Guidelines  Pooled Cohorts Equation Calculator  Breast Cancer Risk Calculator  Breast Cancer: Medication to Reduce Risk  FRAX Risk Assessment  ICSI Preventive Guidelines  Dietary Guidelines for Americans, 2010  USDA's MyPlate  ASA Prophylaxis  Lung CA Screening    Nora Hernandez DO  [facility]    Identified Health Risks:

## 2022-05-25 NOTE — LETTER
May 26, 2022      Meryl Fournier  [address] [facility] [address]        Dear ,    We are writing to inform you of your test results.    Vitamin D, parathyroid hormone, thyroid hormone, urine protein are all normal.     The white blood cell count is very mildly elevated but similar to previous values, therefore no further work-up is needed.  Other blood counts are normal.     The kidney function is low but similar to previous values.  The sodium is very mildly low but not significantly different than previous values.  No further testing is needed at this time.    The cholesterol is elevated similar to previous values.  You have tried 3 different cholesterol medications but all caused side effects.       Resulted Orders   TSH with free T4 reflex   Result Value Ref Range    TSH 2.38 0.40 - 4.00 mU/L   Parathyroid Hormone Intact   Result Value Ref Range    Parathyroid Hormone Intact 79 18 - 80 pg/mL   Vitamin D Deficiency   Result Value Ref Range    Vitamin D, Total (25-Hydroxy) 61 20 - 75 ug/L    Narrative    Season, race, dietary intake, and treatment affect the concentration of 25-hydroxy-Vitamin D. Values may decrease during winter months and increase during summer months. Values 20-29 ug/L may indicate Vitamin D insufficiency and values <20 ug/L may indicate Vitamin D deficiency.    Vitamin D determination is routinely performed by an immunoassay specific for 25 hydroxyvitamin D3.  If an individual is on vitamin D2(ergocalciferol) supplementation, please specify 25 OH vitamin D2 and D3 level determination by LCMSMS test VITD23.     CBC with platelets   Result Value Ref Range    WBC Count 11.6 (H) 4.0 - 11.0 10e3/uL    RBC Count 4.16 3.80 - 5.20 10e6/uL    Hemoglobin 12.4 11.7 - 15.7 g/dL    Hematocrit 38.4 35.0 - 47.0 %    MCV 92 78 - 100 fL    MCH 29.8 26.5 - 33.0 pg    MCHC 32.3 31.5 - 36.5 g/dL    RDW 12.1 10.0 - 15.0 %    Platelet Count 297 150 - 450 10e3/uL   Lipid panel reflex to  direct LDL Fasting   Result Value Ref Range    Cholesterol 241 (H) <200 mg/dL    Triglycerides 176 (H) <150 mg/dL    Direct Measure HDL 51 >=50 mg/dL    LDL Cholesterol Calculated 155 (H) <=100 mg/dL    Non HDL Cholesterol 190 (H) <130 mg/dL    Patient Fasting > 8hrs? Yes     Narrative    Cholesterol  Desirable:  <200 mg/dL    Triglycerides  Normal:  Less than 150 mg/dL  Borderline High:  150-199 mg/dL  High:  200-499 mg/dL  Very High:  Greater than or equal to 500 mg/dL    Direct Measure HDL  Female:  Greater than or equal to 50 mg/dL   Male:  Greater than or equal to 40 mg/dL    LDL Cholesterol  Desirable:  <100mg/dL  Above Desirable:  100-129 mg/dL   Borderline High:  130-159 mg/dL   High:  160-189 mg/dL   Very High:  >= 190 mg/dL    Non HDL Cholesterol  Desirable:  130 mg/dL  Above Desirable:  130-159 mg/dL  Borderline High:  160-189 mg/dL  High:  190-219 mg/dL  Very High:  Greater than or equal to 220 mg/dL   BASIC METABOLIC PANEL   Result Value Ref Range    Sodium 132 (L) 133 - 144 mmol/L    Potassium 5.1 3.4 - 5.3 mmol/L    Chloride 101 94 - 109 mmol/L    Carbon Dioxide (CO2) 26 20 - 32 mmol/L    Anion Gap 5 3 - 14 mmol/L    Urea Nitrogen 24 7 - 30 mg/dL    Creatinine 1.68 (H) 0.52 - 1.04 mg/dL    Calcium 9.8 8.5 - 10.1 mg/dL    Glucose 96 70 - 99 mg/dL    GFR Estimate 31 (L) >60 mL/min/1.73m2      Comment:      Effective December 21, 2021 eGFRcr in adults is calculated using the 2021 CKD-EPI creatinine equation which includes age and gender (Hang et al., NEJM, DOI: 10.1056/SZQBat8992131)   Albumin Random Urine Quantitative with Creat Ratio   Result Value Ref Range    Creatinine Urine mg/dL 55 mg/dL    Albumin Urine mg/L 11 mg/L    Albumin Urine mg/g Cr 20.00 0.00 - 25.00 mg/g Cr       If you have any questions or concerns, please call the clinic at the number listed above.       Sincerely,      Nora Hernandez DO

## 2022-05-25 NOTE — PATIENT INSTRUCTIONS
Bunion:  Discussed cause and treatment options.  Main treatment options are good supportive shoes, inserts, orthotics, etc.  Next treatment option is surgery;  you declined referral to podiatry, if you change your mid, let Dr. Hernandez know    Heart pounding symptoms:  Blood work today  EKG today  Recommend 7 day heart monitor.  Please call [phone] to schedule.   If monitor is normal, would recommend stress test    Chronic Kidney disease:  Would recommend consultation with kidney doctor but you declined;  if you change your mind, let DR. Hernandez know.    Hypertension:  Continue current medications.  Start carvedilol 3.125 mg twice daily  RN blood pressure check in 2-4 weeks.    Health Care Maintenance:  Radiology test was ordered - DEXA.  Please call [phone] to schedule.        Patient Education   Personalized Prevention Plan  You are due for the preventive services outlined below.  Your care team is available to assist you in scheduling these services.  If you have already completed any of these items, please share that information with your care team to update in your medical record.  Health Maintenance Due   Topic Date Due    ANNUAL REVIEW OF HM ORDERS  Never done    LUNG CANCER SCREENING  Never done    Kidney Microalbumin Urine Test  08/18/2021    Basic Metabolic Panel  11/03/2021    Osteoporosis Screening  12/04/2021    Cholesterol Lab  02/18/2022    Thyroid Function Lab  02/18/2022    Hemoglobin  01/26/2022     Your Health Risk Assessment indicates you feel you are not in good health    A healthy lifestyle helps keep the body fit and the mind alert. It helps protect you from disease, helps you fight disease, and helps prevent chronic disease (disease that doesn't go away) from getting worse. This is important as you get older and begin to notice twinges in muscles and joints and a decline in the strength and stamina you once took for granted. A healthy lifestyle includes good healthcare, good nutrition,  weight control, recreation, and regular exercise. Avoid harmful substances and do what you can to keep safe. Another part of a healthy lifestyle is stay mentally active and socially involved.    Good healthcare   Have a wellness visit every year.   If you have new symptoms, let us know right away. Don't wait until the next checkup.   Take medicines exactly as prescribed and keep your medicines in a safe place. Tell us if your medicine causes problems.   Healthy diet and weight control   Eat 3 or 4 small, nutritious, low-fat, high-fiber meals a day. Include a variety of fruits, vegetables, and whole-grain foods.   Make sure you get enough calcium in your diet. Calcium, vitamin D, and exercise help prevent osteoporosis (bone thinning).   If you live alone, try eating with others when you can. That way you get a good meal and have company while you eat it.   Try to keep a healthy weight. If you eat more calories than your body uses for energy, it will be stored as fat and you will gain weight.     Recreation   Recreation is not limited to sports and team events. It includes any activity that provides relaxation, interest, enjoyment, and exercise. Recreation provides an outlet for physical, mental, and social energy. It can give a sense of worth and achievement. It can help you stay healthy.    Mental Exercise and Social Involvement  Mental and emotional health is as important as physical health. Keep in touch with friends and family. Stay as active as possible. Continue to learn and challenge yourself.   Things you can do to stay mentally active are:  Learn something new, like a foreign language or musical instrument.   Play SCRABBLE or do crossword puzzles. If you cannot find people to play these games with you at home, you can play them with others on your computer through the Internet.   Join a games club--anything from card games to chess or checkers or lawn bowling.   Start a new hobby.   Go back to school.    Volunteer.   Read.   Keep up with world events.    Signs of Hearing Loss      Hearing much better with one ear can be a sign of hearing loss.   Hearing loss is a problem shared by many people. In fact, it is one of the most common health problems, particularly as people age. Most people age 65 and older have some hearing loss. By age 80, almost everyone does. Hearing loss often occurs slowly over the years. So you may not realize your hearing has gotten worse.  Have your hearing checked  Call your healthcare provider if you:  Have to strain to hear normal conversation  Have to watch other people s faces very carefully to follow what they re saying  Need to ask people to repeat what they ve said  Often misunderstand what people are saying  Turn the volume of the television or radio up so high that others complain  Feel that people are mumbling when they re talking to you  Find that the effort to hear leaves you feeling tired and irritated  Notice, when using the phone, that you hear better with one ear than the other  [employer] last reviewed this educational content on 1/1/2020 2000-2021 [employer]. All rights reserved. This information is not intended as a substitute for professional medical care. Always follow your healthcare professional's instructions.

## 2022-05-25 NOTE — PROGRESS NOTES
"    The patient was provided with suggestions to help her develop a healthy physical lifestyle.  The patient was provided with written information regarding signs of hearing loss.  Answers for HPI/ROS submitted by the patient on 5/25/2022  In general, how would you rate your overall physical health?: fair  Frequency of exercise:: 6-7 days/week  Do you usually eat at least 4 servings of fruit and vegetables a day, include whole grains & fiber, and avoid regularly eating high fat or \"junk\" foods? : Yes  Taking medications regularly:: Yes  Medication side effects:: None  Activities of Daily Living: no assistance needed  Home safety: no safety concerns identified  Hearing Impairment:: difficulty following a conversation in a noisy restaurant or crowded room, feel that people are mumbling or not speaking clearly, difficulty following dialogue in the theater, difficult to understand a speaker at a public meeting or [employer] service, need to ask people to speak up or repeat themselves, find that men's voices are easier to understand than woman's, difficulty understanding soft or whispered speech, difficulty understanding speech on the telephone  In the past 6 months, have you been bothered by leaking of urine?: No  abdominal pain: No  Blood in stool: No  Blood in urine: No  chest pain: Yes  chills: Yes  congestion: No  constipation: No  cough: Yes  diarrhea: No  dizziness: No  ear pain: No  eye pain: No  nervous/anxious: No  fever: No  frequency: No  genital sores: No  headaches: No  hearing loss: Yes  heartburn: No  arthralgias: Yes  joint swelling: No  peripheral edema: No  mood changes: No  myalgias: No  nausea: No  dysuria: No  palpitations: Yes  Skin sensation changes: No  sore throat: No  urgency: No  rash: No  shortness of breath: Yes  visual disturbance: No  weakness: No  pelvic pain: No  vaginal bleeding: No  vaginal discharge: No  tenderness: No  breast mass: No  breast discharge: No  In general, how would you " rate your overall mental or emotional health?: good  Additional concerns today:: Yes  Duration of exercise:: 30-45 minutes

## 2022-05-26 DIAGNOSIS — I25.10 CORONARY ARTERY DISEASE INVOLVING NATIVE CORONARY ARTERY OF NATIVE HEART WITHOUT ANGINA PECTORIS: Primary | ICD-10-CM

## 2022-05-26 NOTE — RESULT ENCOUNTER NOTE
Vitamin D, parathyroid hormone, thyroid hormone, urine protein are all normal.    The white blood cell count is very mildly elevated but similar to previous values, therefore no further work-up is needed.  Other blood counts are normal.    The kidney function is low but similar to previous values.  The sodium is very mildly low but not significantly different than previous values.  No further testing is needed at this time.\    The cholesterol is elevated similar to previous values.  You have tried 3 different cholesterol medications but all caused side effects.

## 2022-05-31 ENCOUNTER — TELEPHONE (OUTPATIENT)
Dept: FAMILY MEDICINE | Facility: CLINIC | Age: 77
End: 2022-05-31
Payer: COMMERCIAL

## 2022-05-31 NOTE — TELEPHONE ENCOUNTER
Patient Quality Outreach    Patient is due for the following:   Hypertension -  BP check    NEXT STEPS:   Patient was scheduled for an appointment.    Type of outreach:    Chart review performed, no outreach needed.      Questions for provider review:    None     Viktoria Ozuna MA

## 2022-06-08 ENCOUNTER — TELEPHONE (OUTPATIENT)
Dept: FAMILY MEDICINE | Facility: CLINIC | Age: 77
End: 2022-06-08

## 2022-06-08 ENCOUNTER — ALLIED HEALTH/NURSE VISIT (OUTPATIENT)
Dept: FAMILY MEDICINE | Facility: CLINIC | Age: 77
End: 2022-06-08
Payer: COMMERCIAL

## 2022-06-08 VITALS — HEART RATE: 56 BPM | SYSTOLIC BLOOD PRESSURE: 142 MMHG | DIASTOLIC BLOOD PRESSURE: 68 MMHG

## 2022-06-08 DIAGNOSIS — I10 BENIGN ESSENTIAL HYPERTENSION: Chronic | ICD-10-CM

## 2022-06-08 DIAGNOSIS — I10 BENIGN ESSENTIAL HYPERTENSION: Primary | ICD-10-CM

## 2022-06-08 PROCEDURE — 99207 PR NO CHARGE NURSE ONLY: CPT

## 2022-06-08 RX ORDER — CARVEDILOL 6.25 MG/1
6.25 TABLET ORAL 2 TIMES DAILY WITH MEALS
Qty: 180 TABLET | Refills: 3 | Status: SHIPPED | OUTPATIENT
Start: 2022-06-08 | End: 2023-04-05

## 2022-06-08 NOTE — PROGRESS NOTES
Meryl Fournier is a 76 year old year old patient who comes in today for a Blood Pressure check because of new medication.  5/25: started on carvedilol. Pt states has been on this med 1.5 weeks.    Vital Signs as repeated by RN   142/68 p56  142/68    Patient is taking medication as prescribed  Patient is tolerating medications well.  Patient is not monitoring Blood Pressure at home.    Current complaints: none  Preferred pharmacy: [facility], [place]  Disposition:  Routed to provider for review.    Betys Becerra RN

## 2022-06-08 NOTE — TELEPHONE ENCOUNTER
Meryl Gomez is a 76 year old year old patient who comes in today for a Blood Pressure check because of new medication.  5/25: started on carvedilol. Pt states has been on this med 1.5 weeks.     Vital Signs as repeated by RN   142/68 p56  142/68     Patient is taking medication as prescribed  Patient is tolerating medications well.  Patient is not monitoring Blood Pressure at home.    Current complaints: none  Preferred pharmacy: [facility], [place]  Disposition:  Routed to provider for review.     Betsy Becerra RN

## 2022-06-08 NOTE — TELEPHONE ENCOUNTER
Blood pressure still above goal.  Increase carvedilol to 6.25 mg twice daily.  She can take 2 of the 3.125 mg tablets twice daily until they are gone, and the pharmacy will have the higher dose available for her.  RN BP check 2-4 weeks

## 2022-06-09 NOTE — TELEPHONE ENCOUNTER
The patient was notified and agrees with the plan. The patient was scheduled with RN.      Thank you    Eugenia GOYAL RN

## 2022-07-08 ENCOUNTER — TELEPHONE (OUTPATIENT)
Dept: FAMILY MEDICINE | Facility: CLINIC | Age: 77
End: 2022-07-08

## 2022-07-08 ENCOUNTER — HOSPITAL ENCOUNTER (OUTPATIENT)
Dept: BONE DENSITY | Facility: CLINIC | Age: 77
Discharge: HOME | End: 2022-07-08
Attending: INTERNAL MEDICINE | Admitting: INTERNAL MEDICINE
Payer: COMMERCIAL

## 2022-07-08 ENCOUNTER — ALLIED HEALTH/NURSE VISIT (OUTPATIENT)
Dept: FAMILY MEDICINE | Facility: CLINIC | Age: 77
End: 2022-07-08
Payer: COMMERCIAL

## 2022-07-08 VITALS — HEART RATE: 56 BPM | SYSTOLIC BLOOD PRESSURE: 116 MMHG | DIASTOLIC BLOOD PRESSURE: 52 MMHG

## 2022-07-08 DIAGNOSIS — I10 BENIGN ESSENTIAL HYPERTENSION: Primary | ICD-10-CM

## 2022-07-08 DIAGNOSIS — N18.4 CKD (CHRONIC KIDNEY DISEASE) STAGE 4, GFR 15-29 ML/MIN (H): ICD-10-CM

## 2022-07-08 DIAGNOSIS — Z78.0 MENOPAUSE: ICD-10-CM

## 2022-07-08 PROCEDURE — 99207 PR NO CHARGE NURSE ONLY: CPT

## 2022-07-08 PROCEDURE — 77080 DXA BONE DENSITY AXIAL: CPT

## 2022-07-08 NOTE — TELEPHONE ENCOUNTER
Meryl Fournier is a 76 year old year old patient who comes in today for a Blood Pressure check because of medication change. Carvedilol was increased to 6.25mg twice daily on 6/9/22.    Vital Signs as repeated by RN: BP- 120/56, P- 56. Recheck: BP- 116/52, P- 56.  Patient is taking medication as prescribed  Patient is tolerating medications well.  Patient is not monitoring Blood Pressure at home.  Current complaints: none  Disposition: Patient to continue with the same medication, and await provider response. Routing to PCP for review.    Sophia Romero RN  [facility]

## 2022-07-08 NOTE — NURSING NOTE
Meryl Fournier is a 76 year old year old patient who comes in today for a Blood Pressure check because of medication change. Carvedilol was increased to 6.25mg twice daily on 6/9/22.    Vital Signs as repeated by RN: BP- 120/56, P- 56. Recheck: BP- 116/52, P- 56.  Patient is taking medication as prescribed  Patient is tolerating medications well.  Patient is not monitoring Blood Pressure at home.  Current complaints: none  Disposition: Patient to continue with the same medication, and await provider response. Routing to PCP for review.    Spohia Romero RN  [facility]

## 2022-07-11 NOTE — TELEPHONE ENCOUNTER
Pt called back and was given the Message By geneva Ramires Blood pressure is improving. No changes to medications. Pt verbalized understanding.     Wendie Frausto RN

## 2023-01-24 DIAGNOSIS — F41.1 GAD (GENERALIZED ANXIETY DISORDER): ICD-10-CM

## 2023-01-24 RX ORDER — ALPRAZOLAM 0.5 MG
0.5 TABLET ORAL 2 TIMES DAILY PRN
Qty: 60 TABLET | Refills: 3 | Status: SHIPPED | OUTPATIENT
Start: 2023-01-24 | End: 2023-06-13

## 2023-01-24 NOTE — TELEPHONE ENCOUNTER
LM on patient VM rx approved and sent to [facility]. Ami Walter on 1/24/2023 at 3:09 PM

## 2023-04-05 DIAGNOSIS — I10 BENIGN ESSENTIAL HYPERTENSION: Chronic | ICD-10-CM

## 2023-04-05 RX ORDER — CARVEDILOL 6.25 MG/1
6.25 TABLET ORAL 2 TIMES DAILY WITH MEALS
Qty: 180 TABLET | Refills: 0 | Status: SHIPPED | OUTPATIENT
Start: 2023-04-05 | End: 2023-06-13

## 2023-04-25 ENCOUNTER — PATIENT OUTREACH (OUTPATIENT)
Dept: CARE COORDINATION | Facility: CLINIC | Age: 78
End: 2023-04-25
Payer: COMMERCIAL

## 2023-05-09 ENCOUNTER — PATIENT OUTREACH (OUTPATIENT)
Dept: CARE COORDINATION | Facility: CLINIC | Age: 78
End: 2023-05-09
Payer: COMMERCIAL

## 2023-06-13 ENCOUNTER — OFFICE VISIT (OUTPATIENT)
Dept: FAMILY MEDICINE | Facility: CLINIC | Age: 78
End: 2023-06-13
Payer: COMMERCIAL

## 2023-06-13 VITALS
RESPIRATION RATE: 16 BRPM | TEMPERATURE: 97 F | DIASTOLIC BLOOD PRESSURE: 70 MMHG | HEART RATE: 56 BPM | BODY MASS INDEX: 30.77 KG/M2 | OXYGEN SATURATION: 98 % | HEIGHT: 64 IN | WEIGHT: 180.2 LBS | SYSTOLIC BLOOD PRESSURE: 120 MMHG

## 2023-06-13 DIAGNOSIS — N18.4 CKD (CHRONIC KIDNEY DISEASE) STAGE 4, GFR 15-29 ML/MIN (H): Primary | ICD-10-CM

## 2023-06-13 DIAGNOSIS — N25.81 SECONDARY RENAL HYPERPARATHYROIDISM (H): ICD-10-CM

## 2023-06-13 DIAGNOSIS — R06.02 SOB (SHORTNESS OF BREATH): ICD-10-CM

## 2023-06-13 DIAGNOSIS — I10 BENIGN ESSENTIAL HYPERTENSION: Chronic | ICD-10-CM

## 2023-06-13 DIAGNOSIS — Z23 NEED FOR VACCINATION: ICD-10-CM

## 2023-06-13 DIAGNOSIS — F41.1 GAD (GENERALIZED ANXIETY DISORDER): ICD-10-CM

## 2023-06-13 DIAGNOSIS — I25.10 CORONARY ARTERY DISEASE INVOLVING NATIVE CORONARY ARTERY OF NATIVE HEART WITHOUT ANGINA PECTORIS: ICD-10-CM

## 2023-06-13 DIAGNOSIS — K21.9 GASTROESOPHAGEAL REFLUX DISEASE WITHOUT ESOPHAGITIS: ICD-10-CM

## 2023-06-13 DIAGNOSIS — R53.82 CHRONIC FATIGUE: ICD-10-CM

## 2023-06-13 DIAGNOSIS — N18.4 CKD (CHRONIC KIDNEY DISEASE) STAGE 4, GFR 15-29 ML/MIN (H): ICD-10-CM

## 2023-06-13 DIAGNOSIS — E03.9 ACQUIRED HYPOTHYROIDISM: ICD-10-CM

## 2023-06-13 DIAGNOSIS — E66.89 OTHER OBESITY: ICD-10-CM

## 2023-06-13 DIAGNOSIS — Z00.00 ENCOUNTER FOR MEDICARE ANNUAL WELLNESS EXAM: Primary | ICD-10-CM

## 2023-06-13 LAB
ALBUMIN SERPL BCG-MCNC: 4.2 G/DL (ref 3.5–5.2)
ALP SERPL-CCNC: 75 U/L (ref 35–104)
ALT SERPL W P-5'-P-CCNC: 11 U/L (ref 0–50)
ANION GAP SERPL CALCULATED.3IONS-SCNC: 9 MMOL/L (ref 7–15)
AST SERPL W P-5'-P-CCNC: 15 U/L (ref 0–45)
BILIRUB DIRECT SERPL-MCNC: <0.2 MG/DL (ref 0–0.3)
BILIRUB SERPL-MCNC: 0.6 MG/DL
BUN SERPL-MCNC: 36.4 MG/DL (ref 8–23)
CALCIUM SERPL-MCNC: 9.6 MG/DL (ref 8.8–10.2)
CHLORIDE SERPL-SCNC: 104 MMOL/L (ref 98–107)
CHOLEST SERPL-MCNC: 222 MG/DL
CREAT SERPL-MCNC: 1.82 MG/DL (ref 0.51–0.95)
CREAT UR-MCNC: 41.3 MG/DL
CRP SERPL-MCNC: 3.76 MG/L
DEPRECATED CALCIDIOL+CALCIFEROL SERPL-MC: 46 UG/L (ref 20–75)
DEPRECATED HCO3 PLAS-SCNC: 26 MMOL/L (ref 22–29)
ERYTHROCYTE [DISTWIDTH] IN BLOOD BY AUTOMATED COUNT: 12.4 % (ref 10–15)
ERYTHROCYTE [SEDIMENTATION RATE] IN BLOOD BY WESTERGREN METHOD: 38 MM/HR (ref 0–30)
GFR SERPL CREATININE-BSD FRML MDRD: 28 ML/MIN/1.73M2
GLUCOSE SERPL-MCNC: 98 MG/DL (ref 70–99)
HCT VFR BLD AUTO: 35.6 % (ref 35–47)
HDLC SERPL-MCNC: 43 MG/DL
HGB BLD-MCNC: 11.7 G/DL (ref 11.7–15.7)
LDLC SERPL CALC-MCNC: 149 MG/DL
MCH RBC QN AUTO: 29.5 PG (ref 26.5–33)
MCHC RBC AUTO-ENTMCNC: 32.9 G/DL (ref 31.5–36.5)
MCV RBC AUTO: 90 FL (ref 78–100)
MICROALBUMIN UR-MCNC: <12 MG/L
MICROALBUMIN/CREAT UR: NORMAL MG/G{CREAT}
NONHDLC SERPL-MCNC: 179 MG/DL
PLATELET # BLD AUTO: 222 10E3/UL (ref 150–450)
POTASSIUM SERPL-SCNC: 5.3 MMOL/L (ref 3.4–5.3)
PROT SERPL-MCNC: 7.2 G/DL (ref 6.4–8.3)
RBC # BLD AUTO: 3.96 10E6/UL (ref 3.8–5.2)
SODIUM SERPL-SCNC: 139 MMOL/L (ref 136–145)
TRIGL SERPL-MCNC: 148 MG/DL
TSH SERPL DL<=0.005 MIU/L-ACNC: 0.63 UIU/ML (ref 0.3–4.2)
VIT B12 SERPL-MCNC: 410 PG/ML (ref 232–1245)
WBC # BLD AUTO: 10 10E3/UL (ref 4–11)

## 2023-06-13 PROCEDURE — 86140 C-REACTIVE PROTEIN: CPT | Performed by: INTERNAL MEDICINE

## 2023-06-13 PROCEDURE — 99214 OFFICE O/P EST MOD 30 MIN: CPT | Mod: 25 | Performed by: INTERNAL MEDICINE

## 2023-06-13 PROCEDURE — 0124A COVID-19 BIVALENT 12+ (PFIZER): CPT | Performed by: INTERNAL MEDICINE

## 2023-06-13 PROCEDURE — 91312 COVID-19 BIVALENT 12+ (PFIZER): CPT | Performed by: INTERNAL MEDICINE

## 2023-06-13 PROCEDURE — 84443 ASSAY THYROID STIM HORMONE: CPT | Performed by: INTERNAL MEDICINE

## 2023-06-13 PROCEDURE — 82607 VITAMIN B-12: CPT | Performed by: INTERNAL MEDICINE

## 2023-06-13 PROCEDURE — 80053 COMPREHEN METABOLIC PANEL: CPT | Performed by: INTERNAL MEDICINE

## 2023-06-13 PROCEDURE — 82306 VITAMIN D 25 HYDROXY: CPT | Performed by: INTERNAL MEDICINE

## 2023-06-13 PROCEDURE — 82570 ASSAY OF URINE CREATININE: CPT | Performed by: INTERNAL MEDICINE

## 2023-06-13 PROCEDURE — 85652 RBC SED RATE AUTOMATED: CPT | Performed by: INTERNAL MEDICINE

## 2023-06-13 PROCEDURE — 36415 COLL VENOUS BLD VENIPUNCTURE: CPT | Performed by: INTERNAL MEDICINE

## 2023-06-13 PROCEDURE — 82043 UR ALBUMIN QUANTITATIVE: CPT | Performed by: INTERNAL MEDICINE

## 2023-06-13 PROCEDURE — 82248 BILIRUBIN DIRECT: CPT | Performed by: INTERNAL MEDICINE

## 2023-06-13 PROCEDURE — 85027 COMPLETE CBC AUTOMATED: CPT | Performed by: INTERNAL MEDICINE

## 2023-06-13 PROCEDURE — G0439 PPPS, SUBSEQ VISIT: HCPCS | Performed by: INTERNAL MEDICINE

## 2023-06-13 PROCEDURE — 80061 LIPID PANEL: CPT | Performed by: INTERNAL MEDICINE

## 2023-06-13 RX ORDER — LISINOPRIL AND HYDROCHLOROTHIAZIDE 12.5; 2 MG/1; MG/1
2 TABLET ORAL DAILY
Qty: 180 TABLET | Refills: 3 | Status: SHIPPED | OUTPATIENT
Start: 2023-06-13 | End: 2024-06-19

## 2023-06-13 RX ORDER — ALPRAZOLAM 0.5 MG
0.5 TABLET ORAL 2 TIMES DAILY PRN
Qty: 60 TABLET | Refills: 5 | Status: SHIPPED | OUTPATIENT
Start: 2023-06-13 | End: 2023-12-15

## 2023-06-13 RX ORDER — LEVOTHYROXINE SODIUM 88 UG/1
88 TABLET ORAL DAILY
Qty: 90 TABLET | Refills: 3 | Status: SHIPPED | OUTPATIENT
Start: 2023-06-13 | End: 2024-06-19

## 2023-06-13 RX ORDER — FAMOTIDINE 40 MG/1
40 TABLET, FILM COATED ORAL AT BEDTIME
Qty: 90 TABLET | Refills: 3 | Status: SHIPPED | OUTPATIENT
Start: 2023-06-13 | End: 2024-06-19

## 2023-06-13 RX ORDER — CARVEDILOL 6.25 MG/1
6.25 TABLET ORAL 2 TIMES DAILY WITH MEALS
Qty: 180 TABLET | Refills: 3 | Status: SHIPPED | OUTPATIENT
Start: 2023-06-13 | End: 2024-06-19

## 2023-06-13 ASSESSMENT — ENCOUNTER SYMPTOMS
HEMATURIA: 0
ARTHRALGIAS: 1
CONSTIPATION: 1
CHILLS: 1
ABDOMINAL PAIN: 0
HEMATOCHEZIA: 0
BREAST MASS: 0
MYALGIAS: 1
WEAKNESS: 1

## 2023-06-13 ASSESSMENT — PAIN SCALES - GENERAL: PAINLEVEL: WORST PAIN (10)

## 2023-06-13 ASSESSMENT — ACTIVITIES OF DAILY LIVING (ADL): CURRENT_FUNCTION: NO ASSISTANCE NEEDED

## 2023-06-13 NOTE — PATIENT INSTRUCTIONS
Medications  Refills sent    Fatigue  Shortness of breath / Cough  Blood work today  If all normal, recommend CT Chest  If this is normal, recommend follow-up with me to discuss next steps      Patient Education   Personalized Prevention Plan  You are due for the preventive services outlined below.  Your care team is available to assist you in scheduling these services.  If you have already completed any of these items, please share that information with your care team to update in your medical record.  Health Maintenance Due   Topic Date Due    Talk to your care team about options to quit tobacco use.  Never done    LUNG CANCER SCREENING  Never done    Basic Metabolic Panel  08/25/2022    Kidney Microalbumin Urine Test  11/25/2022    Hemoglobin  11/25/2022    Cholesterol Lab  05/25/2023    ANNUAL REVIEW OF HM ORDERS  05/25/2023    Thyroid Function Lab  05/25/2023     Your Health Risk Assessment indicates you feel you are not in good health    A healthy lifestyle helps keep the body fit and the mind alert. It helps protect you from disease, helps you fight disease, and helps prevent chronic disease (disease that doesn't go away) from getting worse. This is important as you get older and begin to notice twinges in muscles and joints and a decline in the strength and stamina you once took for granted. A healthy lifestyle includes good healthcare, good nutrition, weight control, recreation, and regular exercise. Avoid harmful substances and do what you can to keep safe. Another part of a healthy lifestyle is stay mentally active and socially involved.    Good healthcare   Have a wellness visit every year.   If you have new symptoms, let us know right away. Don't wait until the next checkup.   Take medicines exactly as prescribed and keep your medicines in a safe place. Tell us if your medicine causes problems.   Healthy diet and weight control   Eat 3 or 4 small, nutritious, low-fat, high-fiber meals a day. Include a  variety of fruits, vegetables, and whole-grain foods.   Make sure you get enough calcium in your diet. Calcium, vitamin D, and exercise help prevent osteoporosis (bone thinning).   If you live alone, try eating with others when you can. That way you get a good meal and have company while you eat it.   Try to keep a healthy weight. If you eat more calories than your body uses for energy, it will be stored as fat and you will gain weight.     Recreation   Recreation is not limited to sports and team events. It includes any activity that provides relaxation, interest, enjoyment, and exercise. Recreation provides an outlet for physical, mental, and social energy. It can give a sense of worth and achievement. It can help you stay healthy.    Mental Exercise and Social Involvement  Mental and emotional health is as important as physical health. Keep in touch with friends and family. Stay as active as possible. Continue to learn and challenge yourself.   Things you can do to stay mentally active are:  Learn something new, like a foreign language or musical instrument.   Play SCRABBLE or do crossword puzzles. If you cannot find people to play these games with you at home, you can play them with others on your computer through the Internet.   Join a games club--anything from card games to chess or checkers or lawn bowling.   Start a new hobby.   Go back to school.   Volunteer.   Read.   Keep up with world events.    Exercise for a Healthier Heart  You may wonder how you can improve the health of your heart. If you re thinking about exercise, you re on the right track. You don t need to become an athlete. But you do need a certain amount of brisk exercise to help strengthen your heart. If you have been diagnosed with a heart condition, your healthcare provider may advise exercise to help your condition. To help make exercise a habit, choose safe, fun activities.      Exercise with a friend. When activity is fun, you're more  likely to stick with it.     Before you start  Check with your healthcare provider before starting an exercise program. This is especially important if you haven't been active for a while. It's also important if you have a long-term (chronic) health problem such as heart disease, diabetes, or obesity. Also check with your provider if you're at high risk for having these problems.   Why exercise?  Exercising regularly offers many healthy rewards. It can help you do all of these:   Improve your blood cholesterol level to help prevent further heart trouble.  Lower your blood pressure to help prevent a stroke or heart attack.  Control diabetes or reduce your risk of getting this disease.  Improve your heart and lung function.  Reach and stay at a healthy weight.  Make your muscles stronger so you can stay active.  Prevent falls and fractures by slowing the loss of bone mass (osteoporosis).  Manage stress better.  Improve your sense of self and your body image.  Exercise tips    Ease into your routine. Set small goals. Then build on them. Talk with your healthcare provider first before starting an exercise routine if you're not sure what your activity level should be.  Exercise on most days. Aim for a total of at least 150 minutes (2 hours and 30 minutes) or more of moderate-intensity aerobic activity each week. You could also do 75 minutes (1 hour and 15 minutes) or more of vigorous-intensity aerobic activity each week. Or try for a combination of both. Moderate activity means that you breathe heavier and your heart rate increases, but you can still talk. Think about doing at least 30 minutes of moderate exercise, 5 times a week. It's OK to work up to the 30-minute period over time. Examples of moderate-intensity activity are brisk walking, gardening, and water aerobics.  Step up your daily activity level.  Along with your exercise program, try being more active the whole day. Walk instead of drive. Or park further away  so that you take more steps each day. Do more household tasks or yard work. You may not be able to meet the advised amount of physical activity. But doing some moderate- or vigorous-intensity aerobic activity can help reduce your risk for heart disease. Your healthcare provider can help you figure out what is best for you.  Choose 1 or more activities you enjoy.  Walking is one of the easiest things you can do. You can also try swimming, riding a bike, dancing, or taking an exercise class.    Call 911  Call 911 right away if any of these occur:   Chest pain that doesn't go away quickly with rest  New burning, tightness, pressure, or heaviness in your chest, neck, shoulders, back, or arms  Abnormal or severe shortness of breath  A very fast or irregular heartbeat (palpitations)  Fainting  When to call your healthcare provider  Call your healthcare provider if you have any of these:   Dizziness or lightheadedness  Mild shortness of breath or chest pain  Increased or new joint or muscle pain    Lisa last reviewed this educational content on 7/1/2022 2000-2022 [employer]. All rights reserved. This information is not intended as a substitute for professional medical care. Always follow your healthcare professional's instructions.          Signs of Hearing Loss  Hearing loss is a problem shared by many people. In fact, it's one of the most common health problems, particularly as people age. Most people aged 65 and older have some hearing loss. By age 80, almost everyone does. Hearing loss often occurs slowly over the years. So, you may not realize your hearing has gotten worse.   When sudden hearing loss occurs, it's important to contact your healthcare provider right away. Your provider will do a medical exam and a hearing exam as soon as possible. This is to help find the cause and type of your sudden hearing loss. Based on your diagnosis, your healthcare provider will discuss possible treatments.       Hearing much better with one ear can be a sign of hearing loss.     Have your hearing checked  Call your healthcare provider if you:   Have to strain to hear normal conversation  Have to watch other people s faces very carefully to follow what they re saying  Need to ask people to repeat what they ve said  Often misunderstand what people are saying  Turn the volume of the television or radio up so high that others complain  Feel that people are mumbling when they re talking to you  Find that the effort to hear leaves you feeling tired and irritated  Notice, when using the phone, that you hear better with one ear than the other  Lisa last reviewed this educational content on 6/1/2022 2000-2022 [employer]. All rights reserved. This information is not intended as a substitute for professional medical care. Always follow your healthcare professional's instructions.

## 2023-06-13 NOTE — NURSING NOTE
Prior to immunization administration, verified patients identity using patient s name and date of birth. Please see Immunization Activity for additional information.     Screening Questionnaire for Adult Immunization    Are you sick today?   No   Do you have allergies to medications, food, a vaccine component or latex?   No   Have you ever had a serious reaction after receiving a vaccination?   No   Do you have a long-term health problem with heart, lung, kidney, or metabolic disease (e.g., diabetes), asthma, a blood disorder, no spleen, complement component deficiency, a cochlear implant, or a spinal fluid leak?  Are you on long-term aspirin therapy?   No   Do you have cancer, leukemia, HIV/AIDS, or any other immune system problem?   No   Do you have a parent, brother, or sister with an immune system problem?   No   In the past 3 months, have you taken medications that affect  your immune system, such as prednisone, other steroids, or anticancer drugs; drugs for the treatment of rheumatoid arthritis, Crohn s disease, or psoriasis; or have you had radiation treatments?   No   Have you had a seizure, or a brain or other nervous system problem?   No   During the past year, have you received a transfusion of blood or blood    products, or been given immune (gamma) globulin or antiviral drug?   No   For women: Are you pregnant or is there a chance you could become       pregnant during the next month?   No   Have you received any vaccinations in the past 4 weeks?   No     Immunization questionnaire answers were all negative.      Injection of covid given by Viktoria Ozuna MA. Patient instructed to remain in clinic for 15 minutes afterwards, and to report any adverse reactions.     Screening performed by Viktoria Ozuna MA on 6/13/2023 at 11:26 AM.

## 2023-06-13 NOTE — PROGRESS NOTES
"SUBJECTIVE:   Meryl is a 77 year old who presents for Preventive Visit.      6/13/2023     8:25 AM   Additional Questions   Roomed by dilia mays   Accompanied by self         6/13/2023     8:25 AM   Patient Reported Additional Medications   Patient reports taking the following new medications Krill oil 350 mg     Are you in the first 12 months of your Medicare coverage?  No    Healthy Habits:     In general, how would you rate your overall health?  Fair    Frequency of exercise:  1 day/week    Duration of exercise:  30-45 minutes    Do you usually eat at least 4 servings of fruit and vegetables a day, include whole grains    & fiber and avoid regularly eating high fat or \"junk\" foods?  Yes    Taking medications regularly:  Yes    Barriers to taking medications:  None    Medication side effects:  Other    Ability to successfully perform activities of daily living:  No assistance needed    Home Safety:  No safety concerns identified    Hearing Impairment:  Difficulty following a conversation in a noisy restaurant or crowded room, difficulty following dialogue in the theater and difficulty understanding soft or whispered speech    In the past 6 months, have you been bothered by leaking of urine?  No    In general, how would you rate your overall mental or emotional health?  Good      PHQ-2 Total Score: 1    Additional concerns today:  Yes        Chief Complaint   Patient presents with     medicare wellness      Health Maintenance     Due for shingles covid   Will do covid today aware has to go to pharmacy for shingles      Hypertension     Thyroid Disease     Depression     Anxiety         Have you ever done Advance Care Planning? (For example, a Health Directive, POLST, or a discussion with a medical provider or your loved ones about your wishes): Yes, advance care planning is on file.       Fall risk  Fallen 2 or more times in the past year?: No  Any fall with injury in the past year?: No    Cognitive Screening   1) " Repeat 3 items (Leader, Season, Table)    2) Clock draw: NORMAL  3) 3 item recall: Recalls 3 objects  Results: 3 items recalled: COGNITIVE IMPAIRMENT LESS LIKELY    Mini-CogTM Copyright JAMIE Robles. Licensed by the author for use in [facility]; reprinted with permission (dieudonne@[facility]). All rights reserved.      Do you have sleep apnea, excessive snoring or daytime drowsiness?: no    Reviewed and updated as needed this visit by clinical staff   Tobacco  Allergies  Meds  Problems  Med Hx  Surg Hx  Fam Hx          Reviewed and updated as needed this visit by Provider   Tobacco    Problems  Med Hx  Surg Hx  Fam Hx         Social History     Tobacco Use     Smoking status: Every Day     Packs/day: 0.50     Years: 51.00     Pack years: 25.50     Types: Cigarettes     Start date: 1963     Last attempt to quit: 2014     Years since quittin.9     Smokeless tobacco: Never     Tobacco comments:     .25 pack a day    Vaping Use     Vaping status: Never Used   Substance Use Topics     Alcohol use: No             2023    11:06 AM   Alcohol Use   Prescreen: >3 drinks/day or >7 drinks/week? Not Applicable     Do you have a current opioid prescription? No  Do you use any other controlled substances or medications that are not prescribed by a provider? None          Hypertension Follow-up      Do you check your blood pressure regularly outside of the clinic? No     Are you following a low salt diet? No    Are your blood pressures ever more than 140 on the top number (systolic) OR more   than 90 on the bottom number (diastolic), for example 140/90? No    Depression and Anxiety Follow-Up    How are you doing with your depression since your last visit? No change    How are you doing with your anxiety since your last visit?  No change    Are you having other symptoms that might be associated with depression or anxiety? No    Have you had a significant life event? No     Do you have any concerns with  your use of alcohol or other drugs? No     Social History     Tobacco Use     Smoking status: Every Day     Packs/day: 0.50     Years: 51.00     Pack years: 25.50     Types: Cigarettes     Start date: 1963     Last attempt to quit: 2014     Years since quittin.9     Smokeless tobacco: Never     Tobacco comments:     .25 pack a day    Vaping Use     Vaping status: Never Used   Substance Use Topics     Alcohol use: No     Drug use: No         2018    11:14 AM 2019    10:27 AM 2019    11:41 AM   PHQ   PHQ-9 Total Score 8 7 7   Q9: Thoughts of better off dead/self-harm past 2 weeks Not at all Not at all Not at all         2017    11:08 AM 2018    11:14 AM 2019    10:28 AM   BRISSA-7 SCORE   Total Score 6 3 15         2019    11:41 AM   Last PHQ-9   1.  Little interest or pleasure in doing things 1   2.  Feeling down, depressed, or hopeless 1   3.  Trouble falling or staying asleep, or sleeping too much 0   4.  Feeling tired or having little energy 3   5.  Poor appetite or overeating 1   6.  Feeling bad about yourself 1   7.  Trouble concentrating 0   8.  Moving slowly or restless 0   Q9: Thoughts of better off dead/self-harm past 2 weeks 0   PHQ-9 Total Score 7         2019    10:28 AM   BRISSA-7    1. Feeling nervous, anxious, or on edge 3   2. Not being able to stop or control worrying 3   3. Worrying too much about different things 3   4. Trouble relaxing 3   5. Being so restless that it is hard to sit still 1   6. Becoming easily annoyed or irritable 1   7. Feeling afraid, as if something awful might happen 1   BRSISA-7 Total Score 15   If you checked any problems, how difficult have they made it for you to do your work, take care of things at home, or get along with other people? Somewhat difficult       Suicide Assessment Five-step Evaluation and Treatment (SAFE-T)    Hypothyroidism Follow-up      Since last visit, patient describes the following symptoms: Weight stable, no hair  loss, no skin changes, no constipation, no loose stools, constipation and hair loss    Fatigue   Reports fatigue, low energy and 'doesn't feel well' -  --wonders if due to undiagnosed remote covid  --will have a few days of cough and shortness of breath, but self resolves after a few days  --no fevers, chills, night sweats, headache, vision changes, chest pain, abdominal pain, N/V/D/C   --history of fibromyalgia and struggles with various aches/pains; only worsening is right wrist after a lot of snow blowing this winter  --start back to smoking after son's illness over Xmas -1/4-1/2 ppd    Current providers sharing in care for this patient include:   Patient Care Team:  Nora Hernandez DO as PCP - General (Internal Medicine)  Nora Hernandez DO as Assigned PCP    The following health maintenance items are reviewed in Epic and correct as of today:  Health Maintenance   Topic Date Due     NICOTINE/TOBACCO CESSATION COUNSELING Q 1 YR  Never done     LUNG CANCER SCREENING  Never done     BMP  08/25/2022     MICROALBUMIN  11/25/2022     HEMOGLOBIN  11/25/2022     LIPID  05/25/2023     ANNUAL REVIEW OF HM ORDERS  05/25/2023     TSH W/FREE T4 REFLEX  05/25/2023     ZOSTER IMMUNIZATION (1 of 2) 12/29/2023 (Originally 8/5/1964)     MEDICARE ANNUAL WELLNESS VISIT  06/13/2024     FALL RISK ASSESSMENT  06/13/2024     DTAP/TDAP/TD IMMUNIZATION (3 - Td or Tdap) 01/25/2027     ADVANCE CARE PLANNING  06/13/2028     DEXA  07/08/2037     PARATHYROID  Completed     PHOSPHORUS  Completed     HEPATITIS C SCREENING  Completed     PHQ-2 (once per calendar year)  Completed     INFLUENZA VACCINE  Completed     Pneumococcal Vaccine: 65+ Years  Completed     URINALYSIS  Completed     ALK PHOS  Completed     COVID-19 Vaccine  Completed     IPV IMMUNIZATION  Aged Out     MENINGITIS IMMUNIZATION  Aged Out     COLORECTAL CANCER SCREENING  Discontinued     Current Outpatient Medications   Medication Sig Dispense Refill     ALPRAZolam  "(XANAX) 0.5 MG tablet Take 1 tablet (0.5 mg) by mouth 2 times daily as needed for anxiety 60 tablet 3     aspirin 81 MG tablet Take  by mouth daily.  3     carvedilol (COREG) 6.25 MG tablet TAKE 1 TABLET (6.25 MG) BY MOUTH 2 TIMES DAILY (WITH MEALS) 180 tablet 0     cholecalciferol 5000 UNITS CAPS Take 1 tablet by mouth daily. 30 capsule      Cranberry 1000 MG CAPS        famotidine (PEPCID) 40 MG tablet Take 1 tablet (40 mg) by mouth At Bedtime 90 tablet 3     levothyroxine (SYNTHROID/LEVOTHROID) 88 MCG tablet Take 1 tablet (88 mcg) by mouth daily 90 tablet 3     lisinopril-hydrochlorothiazide (ZESTORETIC) 20-12.5 MG tablet Take 2 tablets by mouth daily 180 tablet 3     omega 3 1000 MG CAPS Take by mouth daily       STATIN NOT PRESCRIBED (INTENTIONAL) Please choose reason not prescribed from choices below.       Turmeric 500 MG CAPS          Pertinent mammograms are reviewed under the imaging tab.    Review of Systems   Constitutional: Positive for chills.   HENT: Positive for hearing loss.    Cardiovascular: Positive for peripheral edema. Negative for chest pain.   Gastrointestinal: Positive for constipation. Negative for abdominal pain and hematochezia.   Breasts:  Negative for tenderness, breast mass and discharge.   Genitourinary: Negative for hematuria, pelvic pain, vaginal bleeding and vaginal discharge.   Musculoskeletal: Positive for arthralgias and myalgias.   Neurological: Positive for weakness.       OBJECTIVE:   /70 (BP Location: Right arm, Patient Position: Sitting, Cuff Size: Adult Regular)   Pulse 56   Temp 97  F (36.1  C) (Tympanic)   Resp 16   Ht 1.635 m (5' 4.37\")   Wt 81.7 kg (180 lb 3.2 oz)   SpO2 98%   BMI 30.58 kg/m   Estimated body mass index is 30.58 kg/m  as calculated from the following:    Height as of this encounter: 1.635 m (5' 4.37\").    Weight as of this encounter: 81.7 kg (180 lb 3.2 oz).  Physical Exam  GENERAL: healthy, alert and no distress  EYES: Eyes grossly normal " to inspection, PERRL and conjunctivae and sclerae normal  HENT: ear canals and TM's normal, nose and mouth without ulcers or lesions  NECK: no adenopathy, no asymmetry, masses, or scars and thyroid normal to palpation  RESP: lungs clear to auscultation - no rales, rhonchi or wheezes  CV: regular rate and rhythm, normal S1 S2, no S3 or S4, no murmur, click or rub, no peripheral edema and peripheral pulses strong  ABDOMEN: soft, nontender, no hepatosplenomegaly, no masses and bowel sounds normal  MS: no gross musculoskeletal defects noted, no edema  SKIN: no suspicious lesions or rashes  NEURO: Normal strength and tone, mentation intact and speech normal  PSYCH: mentation appears normal, affect normal/bright        ASSESSMENT / PLAN:   (Z00.00) Encounter for Medicare annual wellness exam  (primary encounter diagnosis)  Comment:   Plan:     (R53.82) Chronic fatigue  Comment: Still possible causes including stress, depression, sleep apnea, occult infection, occult malignancy, thyroid, vitamin deficiency, PMR, many others.  Start with blood work as below.  Given cough and shortness of breath proceed with CT of the chest.  If that is normal, recommend follow-up visit to ask further questions and determine next round of testing.  Zio patch?  Stress test?  More aggressive treatment of anxiety/depression? Coreg side effects?  Plan: Hepatic panel (Albumin, ALT, AST, Bili, Alk         Phos, TP), CBC with platelets, ESR: Erythrocyte        sedimentation rate, CRP, inflammation, Vitamin         B12, Vitamin D Deficiency, CT Chest w Contrast            (R06.02) SOB (shortness of breath)  Comment: see above  Plan: CT Chest w Contrast            (I10) Benign essential hypertension  Comment: May consider trial off Coreg to see if symptoms improve  Plan: carvedilol (COREG) 6.25 MG tablet,         lisinopril-hydrochlorothiazide (ZESTORETIC)         20-12.5 MG tablet, OFFICE/OUTPT VISIT,EST,LEVL         IV            (K21.9)  Gastroesophageal reflux disease without esophagitis  Comment:  - stable, refill provided  Plan: famotidine (PEPCID) 40 MG tablet, OFFICE/OUTPT         VISIT,EST,LEVL IV            (N18.4) CKD (chronic kidney disease) stage 4, GFR 15-29 ml/min (H)  Comment:  - stable, refill provided  Plan: Albumin Random Urine Quantitative with Creat         Ratio, lisinopril-hydrochlorothiazide         (ZESTORETIC) 20-12.5 MG tablet, OFFICE/OUTPT         VISIT,EST,LEVL IV, CANCELED: BASIC METABOLIC         PANEL            (E03.9) Acquired hypothyroidism  Comment:  - stable, refill provided  Plan: TSH WITH FREE T4 REFLEX, levothyroxine         (SYNTHROID/LEVOTHROID) 88 MCG tablet,         OFFICE/OUTPT VISIT,EST,LEVL IV            (I25.10) Coronary artery disease involving native coronary artery of native heart without angina pectoris  Comment: statin intolerant  Plan: Basic metabolic panel  (Ca, Cl, CO2, Creat,         Gluc, K, Na, BUN), Lipid panel reflex to direct        LDL Fasting, OFFICE/OUTPT VISIT,EST,LEVL IV            (N25.81) Secondary renal hyperparathyroidism (H)  Comment: Known issue that I take into account for their medical decisions, no current exacerbations or new concerns  Plan: OFFICE/OUTPT VISIT,EST,LEVL IV            (F41.1) BRISSA (generalized anxiety disorder)  Comment: Known issue that I take into account for their medical decisions, no current exacerbations or new concerns.  We have had multiple discussions over the years about alternative medications besides benzos to manage anxiety and she has had relative intolerance/side effects and has declined to pursue further medication trials  Plan: ALPRAZolam (XANAX) 0.5 MG tablet, OFFICE/OUTPT         VISIT,EST,LEVL IV            (E66.8) Other obesity  Comment:   Plan: Vitamin D Deficiency            (Z23) Need for vaccination  Comment:   Plan: COVID-19 BIVALENT 12+ (PFIZER), OFFICE/OUTPT         VISIT,EST,LEVL IV              Patient has been advised of split  billing requirements and indicates understanding: Yes      COUNSELING:  Reviewed preventive health counseling, as reflected in patient instructions        She reports that she has been smoking cigarettes. She started smoking about 60 years ago. She has a 25.50 pack-year smoking history. She has never used smokeless tobacco.      Appropriate preventive services were discussed with this patient, including applicable screening as appropriate for cardiovascular disease, diabetes, osteopenia/osteoporosis, and glaucoma.  As appropriate for age/gender, discussed screening for colorectal cancer, prostate cancer, breast cancer, and cervical cancer. Checklist reviewing preventive services available has been given to the patient.    Reviewed patients plan of care and provided an AVS. The Complex Care Plan (for patients with higher acuity and needing more deliberate coordination of services) for Meryl meets the Care Plan requirement. This Care Plan has been established and reviewed with the Patient.          Nora Hernandez, [facility]    Identified Health Risks:    I have reviewed Opioid Use Disorder and Substance Use Disorder risk factors and made any needed referrals.       The patient was provided with suggestions to help her develop a healthy physical lifestyle.  She is at risk for lack of exercise and has been provided with information to increase physical activity for the benefit of her well-being.  The patient was provided with written information regarding signs of hearing loss.

## 2023-06-13 NOTE — RESULT ENCOUNTER NOTE
The kidney function remains low, similar to previous values.  To my recollection, she does not follow with a kidney doctor.  I would highly recommend that she see a kidney doctor, referral placed to [facility] location [phone]  I would recommend she check on insurance coverage for Jardiance -it is a diabetes medication, but has been shown to be significantly helpful in chronic kidney disease.  Cost is often a barrier however    Cholesterol is improved from 1 year ago.    1 inflammatory marker sed rate is very slightly elevated, but the other is normal which is reassuring.  Urine is negative for protein which is good.  Thyroid, liver enzymes, blood counts are all normal.  No clear cause of the fatigue is seen.  Other blood tests are still in process.  Continue with plan to get CT scan

## 2023-07-17 ENCOUNTER — HOSPITAL ENCOUNTER (OUTPATIENT)
Dept: CT IMAGING | Facility: CLINIC | Age: 78
Discharge: HOME | End: 2023-07-17
Attending: INTERNAL MEDICINE | Admitting: INTERNAL MEDICINE
Payer: COMMERCIAL

## 2023-07-17 DIAGNOSIS — R53.82 CHRONIC FATIGUE: ICD-10-CM

## 2023-07-17 DIAGNOSIS — R06.02 SOB (SHORTNESS OF BREATH): ICD-10-CM

## 2023-07-17 LAB
CREAT BLD-MCNC: 1.8 MG/DL (ref 0.5–1)
GFR SERPL CREATININE-BSD FRML MDRD: 28 ML/MIN/1.73M2

## 2023-07-17 PROCEDURE — 71250 CT THORAX DX C-: CPT

## 2023-07-17 PROCEDURE — 82565 ASSAY OF CREATININE: CPT

## 2023-07-18 PROBLEM — R91.8 PULMONARY NODULES: Status: ACTIVE | Noted: 2023-07-18

## 2023-07-18 NOTE — RESULT ENCOUNTER NOTE
There are very small 5 mm nodules in the right lower lung.  Recommend follow-up in 1 year.  Incidental findings of severe coronary artery calcification, not surprising given known history.  There is a small cyst Lucius in the left kidney.  There is mild arthritis in the spine compatible with age-related changes.

## 2023-08-01 ENCOUNTER — HOSPITAL ENCOUNTER (OUTPATIENT)
Dept: GENERAL RADIOLOGY | Facility: CLINIC | Age: 78
Discharge: HOME | End: 2023-08-01
Attending: INTERNAL MEDICINE
Payer: COMMERCIAL

## 2023-08-01 DIAGNOSIS — N18.4 CHRONIC KIDNEY DISEASE, STAGE IV (SEVERE) (H): ICD-10-CM

## 2023-08-01 DIAGNOSIS — M10.00 PRIMARY GOUT: ICD-10-CM

## 2023-08-01 PROCEDURE — 73630 X-RAY EXAM OF FOOT: CPT | Mod: RT

## 2023-08-01 PROCEDURE — 73140 X-RAY EXAM OF FINGER(S): CPT | Mod: LT

## 2023-09-20 DIAGNOSIS — N18.4 CKD (CHRONIC KIDNEY DISEASE) STAGE 4, GFR 15-29 ML/MIN (H): Primary | ICD-10-CM

## 2023-09-20 DIAGNOSIS — N25.81 SECONDARY RENAL HYPERPARATHYROIDISM (H): ICD-10-CM

## 2023-10-02 ENCOUNTER — LAB (OUTPATIENT)
Dept: LAB | Facility: CLINIC | Age: 78
End: 2023-10-02
Payer: COMMERCIAL

## 2023-10-02 DIAGNOSIS — N18.4 CKD (CHRONIC KIDNEY DISEASE) STAGE 4, GFR 15-29 ML/MIN (H): ICD-10-CM

## 2023-10-02 DIAGNOSIS — N25.81 SECONDARY RENAL HYPERPARATHYROIDISM (H): ICD-10-CM

## 2023-10-02 LAB
ALBUMIN SERPL BCG-MCNC: 4.1 G/DL (ref 3.5–5.2)
ANION GAP SERPL CALCULATED.3IONS-SCNC: 10 MMOL/L (ref 7–15)
BUN SERPL-MCNC: 27.5 MG/DL (ref 8–23)
CALCIUM SERPL-MCNC: 9.6 MG/DL (ref 8.8–10.2)
CHLORIDE SERPL-SCNC: 102 MMOL/L (ref 98–107)
CREAT SERPL-MCNC: 1.74 MG/DL (ref 0.51–0.95)
DEPRECATED HCO3 PLAS-SCNC: 24 MMOL/L (ref 22–29)
EGFRCR SERPLBLD CKD-EPI 2021: 29 ML/MIN/1.73M2
FERRITIN SERPL-MCNC: 41 NG/ML (ref 11–328)
GLUCOSE SERPL-MCNC: 101 MG/DL (ref 70–99)
HGB BLD-MCNC: 11.8 G/DL (ref 11.7–15.7)
IRON BINDING CAPACITY (ROCHE): 307 UG/DL (ref 240–430)
IRON SATN MFR SERPL: 29 % (ref 15–46)
IRON SERPL-MCNC: 90 UG/DL (ref 37–145)
PHOSPHATE SERPL-MCNC: 2.9 MG/DL (ref 2.5–4.5)
POTASSIUM SERPL-SCNC: 4.7 MMOL/L (ref 3.4–5.3)
PTH-INTACT SERPL-MCNC: 84 PG/ML (ref 15–65)
SODIUM SERPL-SCNC: 136 MMOL/L (ref 135–145)

## 2023-10-02 PROCEDURE — 82728 ASSAY OF FERRITIN: CPT

## 2023-10-02 PROCEDURE — 82306 VITAMIN D 25 HYDROXY: CPT

## 2023-10-02 PROCEDURE — 85018 HEMOGLOBIN: CPT

## 2023-10-02 PROCEDURE — 83550 IRON BINDING TEST: CPT

## 2023-10-02 PROCEDURE — 36415 COLL VENOUS BLD VENIPUNCTURE: CPT

## 2023-10-02 PROCEDURE — 83540 ASSAY OF IRON: CPT

## 2023-10-02 PROCEDURE — 80069 RENAL FUNCTION PANEL: CPT

## 2023-10-02 PROCEDURE — 83970 ASSAY OF PARATHORMONE: CPT

## 2023-10-04 LAB
DEPRECATED CALCIDIOL+CALCIFEROL SERPL-MC: <62 UG/L (ref 20–75)
VITAMIN D2 SERPL-MCNC: <5 UG/L
VITAMIN D3 SERPL-MCNC: 57 UG/L

## 2023-12-15 ENCOUNTER — TELEPHONE (OUTPATIENT)
Dept: FAMILY MEDICINE | Facility: CLINIC | Age: 78
End: 2023-12-15
Payer: COMMERCIAL

## 2023-12-15 DIAGNOSIS — F41.1 GAD (GENERALIZED ANXIETY DISORDER): ICD-10-CM

## 2023-12-15 RX ORDER — ALPRAZOLAM 0.5 MG
0.5 TABLET ORAL 2 TIMES DAILY PRN
Qty: 60 TABLET | Refills: 5 | Status: SHIPPED | OUTPATIENT
Start: 2023-12-15 | End: 2024-06-19

## 2023-12-15 NOTE — TELEPHONE ENCOUNTER
Reason for Call:  Medication or medication refill:    Do you use a [facility]?  Name of the pharmacy and phone number for the current request:  [facility]    Name of the medication requested:   ALPRAZolam (XANAX) 0.5 MG tablet 60       Other request: [facility] and Esther wants a new prescription for Xanax for they can't transfer it over from [facility].     Can we leave a detailed message on this number? YES    Phone number patient can be reached at: Home number on file [phone] (home)    Best Time: any    Call taken on 12/15/2023 at 11:09 AM by Betsy Benavides

## 2024-04-16 DIAGNOSIS — N18.4 CKD (CHRONIC KIDNEY DISEASE) STAGE 4, GFR 15-29 ML/MIN (H): Primary | ICD-10-CM

## 2024-04-16 DIAGNOSIS — D50.9 IRON DEFICIENCY ANEMIA, UNSPECIFIED IRON DEFICIENCY ANEMIA TYPE: ICD-10-CM

## 2024-04-16 DIAGNOSIS — R53.83 OTHER FATIGUE: ICD-10-CM

## 2024-04-17 ENCOUNTER — LAB (OUTPATIENT)
Dept: LAB | Facility: CLINIC | Age: 79
End: 2024-04-17
Payer: COMMERCIAL

## 2024-04-17 DIAGNOSIS — D50.9 IRON DEFICIENCY ANEMIA, UNSPECIFIED IRON DEFICIENCY ANEMIA TYPE: ICD-10-CM

## 2024-04-17 DIAGNOSIS — R53.83 OTHER FATIGUE: ICD-10-CM

## 2024-04-17 DIAGNOSIS — N18.4 CKD (CHRONIC KIDNEY DISEASE) STAGE 4, GFR 15-29 ML/MIN (H): ICD-10-CM

## 2024-04-17 LAB
ALBUMIN SERPL BCG-MCNC: 4.2 G/DL (ref 3.5–5.2)
ALBUMIN UR-MCNC: NEGATIVE MG/DL
ALP SERPL-CCNC: 68 U/L (ref 40–150)
ALT SERPL W P-5'-P-CCNC: 9 U/L (ref 0–50)
ANION GAP SERPL CALCULATED.3IONS-SCNC: 7 MMOL/L (ref 7–15)
ANION GAP SERPL CALCULATED.3IONS-SCNC: 8 MMOL/L (ref 7–15)
APPEARANCE UR: CLEAR
AST SERPL W P-5'-P-CCNC: 14 U/L (ref 0–45)
BASOPHILS # BLD AUTO: 0.1 10E3/UL (ref 0–0.2)
BASOPHILS NFR BLD AUTO: 1 %
BILIRUB SERPL-MCNC: 0.4 MG/DL
BILIRUB UR QL STRIP: NEGATIVE
BUN SERPL-MCNC: 37.6 MG/DL (ref 8–23)
BUN SERPL-MCNC: 38 MG/DL (ref 8–23)
CALCIUM SERPL-MCNC: 10 MG/DL (ref 8.8–10.2)
CALCIUM SERPL-MCNC: 9.7 MG/DL (ref 8.8–10.2)
CHLORIDE SERPL-SCNC: 103 MMOL/L (ref 98–107)
CHLORIDE SERPL-SCNC: 104 MMOL/L (ref 98–107)
CHOLEST SERPL-MCNC: 243 MG/DL
COLOR UR AUTO: YELLOW
CREAT SERPL-MCNC: 1.88 MG/DL (ref 0.51–0.95)
CREAT SERPL-MCNC: 1.89 MG/DL (ref 0.51–0.95)
CREAT UR-MCNC: 40.9 MG/DL
DEPRECATED HCO3 PLAS-SCNC: 27 MMOL/L (ref 22–29)
DEPRECATED HCO3 PLAS-SCNC: 28 MMOL/L (ref 22–29)
EGFRCR SERPLBLD CKD-EPI 2021: 26 ML/MIN/1.73M2
EGFRCR SERPLBLD CKD-EPI 2021: 27 ML/MIN/1.73M2
EOSINOPHIL # BLD AUTO: 1.3 10E3/UL (ref 0–0.7)
EOSINOPHIL NFR BLD AUTO: 14 %
ERYTHROCYTE [DISTWIDTH] IN BLOOD BY AUTOMATED COUNT: 12.5 % (ref 10–15)
FASTING STATUS PATIENT QL REPORTED: NO
FERRITIN SERPL-MCNC: 82 NG/ML (ref 11–328)
GLUCOSE SERPL-MCNC: 94 MG/DL (ref 70–99)
GLUCOSE SERPL-MCNC: 96 MG/DL (ref 70–99)
GLUCOSE UR STRIP-MCNC: NEGATIVE MG/DL
HCT VFR BLD AUTO: 36.6 % (ref 35–47)
HDLC SERPL-MCNC: 46 MG/DL
HGB BLD-MCNC: 11.7 G/DL (ref 11.7–15.7)
HGB UR QL STRIP: NEGATIVE
IMM GRANULOCYTES # BLD: 0 10E3/UL
IMM GRANULOCYTES NFR BLD: 0 %
IRON BINDING CAPACITY (ROCHE): 311 UG/DL (ref 240–430)
IRON SATN MFR SERPL: 25 % (ref 15–46)
IRON SERPL-MCNC: 79 UG/DL (ref 37–145)
KETONES UR STRIP-MCNC: NEGATIVE MG/DL
LDLC SERPL CALC-MCNC: 166 MG/DL
LEUKOCYTE ESTERASE UR QL STRIP: NEGATIVE
LYMPHOCYTES # BLD AUTO: 2.5 10E3/UL (ref 0.8–5.3)
LYMPHOCYTES NFR BLD AUTO: 26 %
MAGNESIUM SERPL-MCNC: 2.2 MG/DL (ref 1.7–2.3)
MCH RBC QN AUTO: 29.8 PG (ref 26.5–33)
MCHC RBC AUTO-ENTMCNC: 32 G/DL (ref 31.5–36.5)
MCV RBC AUTO: 93 FL (ref 78–100)
MICROALBUMIN UR-MCNC: <12 MG/L
MICROALBUMIN/CREAT UR: NORMAL MG/G{CREAT}
MONOCYTES # BLD AUTO: 0.6 10E3/UL (ref 0–1.3)
MONOCYTES NFR BLD AUTO: 6 %
NEUTROPHILS # BLD AUTO: 5.2 10E3/UL (ref 1.6–8.3)
NEUTROPHILS NFR BLD AUTO: 53 %
NITRATE UR QL: NEGATIVE
NONHDLC SERPL-MCNC: 197 MG/DL
PH UR STRIP: 5.5 [PH] (ref 5–7)
PHOSPHATE SERPL-MCNC: 3.5 MG/DL (ref 2.5–4.5)
PLATELET # BLD AUTO: 228 10E3/UL (ref 150–450)
POTASSIUM SERPL-SCNC: 5 MMOL/L (ref 3.4–5.3)
POTASSIUM SERPL-SCNC: 5.2 MMOL/L (ref 3.4–5.3)
PROT SERPL-MCNC: 7.6 G/DL (ref 6.4–8.3)
PTH-INTACT SERPL-MCNC: 84 PG/ML (ref 15–65)
RBC # BLD AUTO: 3.93 10E6/UL (ref 3.8–5.2)
SODIUM SERPL-SCNC: 138 MMOL/L (ref 135–145)
SODIUM SERPL-SCNC: 139 MMOL/L (ref 135–145)
SP GR UR STRIP: 1.01 (ref 1–1.03)
T4 FREE SERPL-MCNC: 1.71 NG/DL (ref 0.9–1.7)
TRIGL SERPL-MCNC: 154 MG/DL
TSH SERPL DL<=0.005 MIU/L-ACNC: 0.62 UIU/ML (ref 0.3–4.2)
UROBILINOGEN UR STRIP-ACNC: 0.2 E.U./DL
WBC # BLD AUTO: 9.7 10E3/UL (ref 4–11)

## 2024-04-17 PROCEDURE — 84075 ASSAY ALKALINE PHOSPHATASE: CPT

## 2024-04-17 PROCEDURE — 84443 ASSAY THYROID STIM HORMONE: CPT

## 2024-04-17 PROCEDURE — 82247 BILIRUBIN TOTAL: CPT

## 2024-04-17 PROCEDURE — 84460 ALANINE AMINO (ALT) (SGPT): CPT

## 2024-04-17 PROCEDURE — 84439 ASSAY OF FREE THYROXINE: CPT

## 2024-04-17 PROCEDURE — 36415 COLL VENOUS BLD VENIPUNCTURE: CPT

## 2024-04-17 PROCEDURE — 84450 TRANSFERASE (AST) (SGOT): CPT

## 2024-04-17 PROCEDURE — 81003 URINALYSIS AUTO W/O SCOPE: CPT

## 2024-04-17 PROCEDURE — 80061 LIPID PANEL: CPT

## 2024-04-17 PROCEDURE — 82043 UR ALBUMIN QUANTITATIVE: CPT

## 2024-04-17 PROCEDURE — 83550 IRON BINDING TEST: CPT

## 2024-04-17 PROCEDURE — 83970 ASSAY OF PARATHORMONE: CPT

## 2024-04-17 PROCEDURE — 82728 ASSAY OF FERRITIN: CPT

## 2024-04-17 PROCEDURE — 85025 COMPLETE CBC W/AUTO DIFF WBC: CPT

## 2024-04-17 PROCEDURE — 83540 ASSAY OF IRON: CPT

## 2024-04-17 PROCEDURE — 80069 RENAL FUNCTION PANEL: CPT

## 2024-04-17 PROCEDURE — 82570 ASSAY OF URINE CREATININE: CPT

## 2024-04-17 PROCEDURE — 84155 ASSAY OF PROTEIN SERUM: CPT

## 2024-04-17 PROCEDURE — 83735 ASSAY OF MAGNESIUM: CPT

## 2024-04-17 NOTE — LETTER
April 18, 2024      Meryl Fournier  [address] [facility] [address]        Dear ,    We are writing to inform you of your test results.  Kidney function is low but stable compared to 6 months ago.   Resulted Orders   BASIC METABOLIC PANEL   Result Value Ref Range    Sodium 139 135 - 145 mmol/L      Comment:      Reference intervals for this test were updated on 09/26/2023 to more accurately reflect our healthy population. There may be differences in the flagging of prior results with similar values performed with this method. Interpretation of those prior results can be made in the context of the updated reference intervals.     Potassium 5.0 3.4 - 5.3 mmol/L    Chloride 104 98 - 107 mmol/L    Carbon Dioxide (CO2) 28 22 - 29 mmol/L    Anion Gap 7 7 - 15 mmol/L    Urea Nitrogen 38.0 (H) 8.0 - 23.0 mg/dL    Creatinine 1.89 (H) 0.51 - 0.95 mg/dL    GFR Estimate 26 (L) >60 mL/min/1.73m2    Calcium 9.7 8.8 - 10.2 mg/dL    Glucose 94 70 - 99 mg/dL   CBC with platelets and differential   Result Value Ref Range    WBC Count 9.7 4.0 - 11.0 10e3/uL    RBC Count 3.93 3.80 - 5.20 10e6/uL    Hemoglobin 11.7 11.7 - 15.7 g/dL    Hematocrit 36.6 35.0 - 47.0 %    MCV 93 78 - 100 fL    MCH 29.8 26.5 - 33.0 pg    MCHC 32.0 31.5 - 36.5 g/dL    RDW 12.5 10.0 - 15.0 %    Platelet Count 228 150 - 450 10e3/uL    % Neutrophils 53 %    % Lymphocytes 26 %    % Monocytes 6 %    % Eosinophils 14 %    % Basophils 1 %    % Immature Granulocytes 0 %    Absolute Neutrophils 5.2 1.6 - 8.3 10e3/uL    Absolute Lymphocytes 2.5 0.8 - 5.3 10e3/uL    Absolute Monocytes 0.6 0.0 - 1.3 10e3/uL    Absolute Eosinophils 1.3 (H) 0.0 - 0.7 10e3/uL    Absolute Basophils 0.1 0.0 - 0.2 10e3/uL    Absolute Immature Granulocytes 0.0 <=0.4 10e3/uL       If you have any questions or concerns, please call the clinic at the number listed above.       Sincerely,    Dr. Hernandez

## 2024-06-19 ENCOUNTER — OFFICE VISIT (OUTPATIENT)
Dept: FAMILY MEDICINE | Facility: CLINIC | Age: 79
End: 2024-06-19
Attending: INTERNAL MEDICINE
Payer: COMMERCIAL

## 2024-06-19 VITALS
DIASTOLIC BLOOD PRESSURE: 70 MMHG | BODY MASS INDEX: 29.52 KG/M2 | HEIGHT: 65 IN | SYSTOLIC BLOOD PRESSURE: 110 MMHG | RESPIRATION RATE: 14 BRPM | TEMPERATURE: 97 F | WEIGHT: 177.2 LBS | HEART RATE: 59 BPM | OXYGEN SATURATION: 97 %

## 2024-06-19 DIAGNOSIS — R91.8 PULMONARY NODULES: ICD-10-CM

## 2024-06-19 DIAGNOSIS — I10 BENIGN ESSENTIAL HYPERTENSION: Chronic | ICD-10-CM

## 2024-06-19 DIAGNOSIS — N18.4 CKD (CHRONIC KIDNEY DISEASE) STAGE 4, GFR 15-29 ML/MIN (H): ICD-10-CM

## 2024-06-19 DIAGNOSIS — N25.81 SECONDARY RENAL HYPERPARATHYROIDISM (H): ICD-10-CM

## 2024-06-19 DIAGNOSIS — K21.9 GASTROESOPHAGEAL REFLUX DISEASE WITHOUT ESOPHAGITIS: ICD-10-CM

## 2024-06-19 DIAGNOSIS — F41.1 GAD (GENERALIZED ANXIETY DISORDER): ICD-10-CM

## 2024-06-19 DIAGNOSIS — E03.9 ACQUIRED HYPOTHYROIDISM: ICD-10-CM

## 2024-06-19 DIAGNOSIS — M1A.39X0 CHRONIC GOUT DUE TO RENAL IMPAIRMENT OF MULTIPLE SITES WITHOUT TOPHUS: ICD-10-CM

## 2024-06-19 DIAGNOSIS — R53.82 CHRONIC FATIGUE: ICD-10-CM

## 2024-06-19 DIAGNOSIS — M79.7 FIBROMYALGIA: ICD-10-CM

## 2024-06-19 DIAGNOSIS — Z00.00 ENCOUNTER FOR MEDICARE ANNUAL WELLNESS EXAM: Primary | ICD-10-CM

## 2024-06-19 PROCEDURE — G0439 PPPS, SUBSEQ VISIT: HCPCS | Performed by: INTERNAL MEDICINE

## 2024-06-19 PROCEDURE — 99214 OFFICE O/P EST MOD 30 MIN: CPT | Mod: 25 | Performed by: INTERNAL MEDICINE

## 2024-06-19 RX ORDER — VITAMIN B COMPLEX
50 TABLET ORAL DAILY
COMMUNITY

## 2024-06-19 RX ORDER — FAMOTIDINE 40 MG/1
40 TABLET, FILM COATED ORAL AT BEDTIME
Qty: 90 TABLET | Refills: 3 | Status: SHIPPED | OUTPATIENT
Start: 2024-06-19

## 2024-06-19 RX ORDER — ALLOPURINOL 100 MG/1
100 TABLET ORAL DAILY
COMMUNITY
Start: 2024-04-12 | End: 2025-04-12

## 2024-06-19 RX ORDER — LEVOTHYROXINE SODIUM 88 UG/1
88 TABLET ORAL DAILY
Qty: 90 TABLET | Refills: 3 | Status: SHIPPED | OUTPATIENT
Start: 2024-06-19

## 2024-06-19 RX ORDER — FERROUS SULFATE 325(65) MG
325 TABLET ORAL
COMMUNITY

## 2024-06-19 RX ORDER — UBIDECARENONE 30 MG
1 CAPSULE ORAL DAILY
COMMUNITY

## 2024-06-19 RX ORDER — CARVEDILOL 6.25 MG/1
6.25 TABLET ORAL 2 TIMES DAILY WITH MEALS
Qty: 180 TABLET | Refills: 3 | Status: SHIPPED | OUTPATIENT
Start: 2024-06-19

## 2024-06-19 RX ORDER — LISINOPRIL AND HYDROCHLOROTHIAZIDE 12.5; 2 MG/1; MG/1
2 TABLET ORAL DAILY
Qty: 180 TABLET | Refills: 3 | Status: SHIPPED | OUTPATIENT
Start: 2024-06-19

## 2024-06-19 RX ORDER — POTASSIUM CITRATE 15 MEQ/1
15 TABLET, EXTENDED RELEASE ORAL 2 TIMES DAILY
COMMUNITY
Start: 2024-04-12 | End: 2025-04-12

## 2024-06-19 RX ORDER — DULOXETIN HYDROCHLORIDE 20 MG/1
20 CAPSULE, DELAYED RELEASE ORAL DAILY
Qty: 90 CAPSULE | Refills: 3 | Status: SHIPPED | OUTPATIENT
Start: 2024-06-19

## 2024-06-19 RX ORDER — ALPRAZOLAM 0.5 MG
0.5 TABLET ORAL 2 TIMES DAILY PRN
Qty: 60 TABLET | Refills: 5 | Status: SHIPPED | OUTPATIENT
Start: 2024-06-19

## 2024-06-19 SDOH — HEALTH STABILITY: PHYSICAL HEALTH: ON AVERAGE, HOW MANY MINUTES DO YOU ENGAGE IN EXERCISE AT THIS LEVEL?: 0 MIN

## 2024-06-19 SDOH — HEALTH STABILITY: PHYSICAL HEALTH: ON AVERAGE, HOW MANY DAYS PER WEEK DO YOU ENGAGE IN MODERATE TO STRENUOUS EXERCISE (LIKE A BRISK WALK)?: 0 DAYS

## 2024-06-19 ASSESSMENT — PAIN SCALES - GENERAL: PAINLEVEL: EXTREME PAIN (8)

## 2024-06-19 ASSESSMENT — SOCIAL DETERMINANTS OF HEALTH (SDOH): HOW OFTEN DO YOU GET TOGETHER WITH FRIENDS OR RELATIVES?: MORE THAN THREE TIMES A WEEK

## 2024-06-19 NOTE — PATIENT INSTRUCTIONS
"Patient Education   Preventive Care Advice   This is general advice we often give to help people stay healthy. Your care team may have specific advice just for you. Please talk to your care team about your own preventive care needs.  Lifestyle  Exercise at least 150 minutes each week (30 minutes a day, 5 days a week).  Do muscle strengthening activities 2 days a week. These help control your weight and prevent disease.  No smoking.  Wear sunscreen to prevent skin cancer.  Have your home tested for radon every 2 to 5 years. Radon is a colorless, odorless gas that can harm your lungs. To learn more, go to www.health.[facility].[place].us and search for \"Radon in Homes.\"  Keep guns unloaded and locked up in a safe place like a safe or gun vault, or, use a gun lock and hide the keys. Always lock away bullets separately. To learn more, visit [employer].[place].gov and search for \"safe gun storage.\"  Nutrition  Eat 5 or more servings of fruits and vegetables each day.  Try wheat bread, brown rice and whole grain pasta (instead of white bread, rice, and pasta).  Get enough calcium and vitamin D. Check the label on foods and aim for 100% of the RDA (recommended daily allowance).  Regular exams  Have a dental exam and cleaning every 6 months.  See your health care team every year to talk about:  Any changes in your health.  Any medicines your care team has prescribed.  Preventive care, family planning, and ways to prevent chronic diseases.  Shots (vaccines)   HPV shots (up to age 26), if you've never had them before.  Hepatitis B shots (up to age 59), if you've never had them before.  COVID-19 shot: Get this shot when it's due.  Flu shot: Get a flu shot every year.  Tetanus shot: Get a tetanus shot every 10 years.  Pneumococcal, hepatitis A, and RSV shots: Ask your care team if you need these based on your risk.  Shingles shot (for age 50 and up).  General health tests  Diabetes screening:  Starting at age 35, Get screened for diabetes at least " every 3 years.  If you are younger than age 35, ask your care team if you should be screened for diabetes.  Cholesterol test: At age 39, start having a cholesterol test every 5 years, or more often if advised.  Bone density scan (DEXA): At age 50, ask your care team if you should have this scan for osteoporosis (brittle bones).  Hepatitis C: Get tested at least once in your life.  Abdominal aortic aneurysm screening: Talk to your doctor about having this screening if you:  Have ever smoked; and  Are biologically male; and  Are between the ages of 65 and 75.  STIs (sexually transmitted infections)  Before age 24: Ask your care team if you should be screened for STIs.  After age 24: Get screened for STIs if you're at risk. You are at risk for STIs (including HIV) if:  You are sexually active with more than one person.  You don't use condoms every time.  You or a partner was diagnosed with a sexually transmitted infection.  If you are at risk for HIV, ask about PrEP medicine to prevent HIV.  Get tested for HIV at least once in your life, whether you are at risk for HIV or not.  Cancer screening tests  Cervical cancer screening: If you have a cervix, begin getting regular cervical cancer screening tests at age 21. Most people who have regular screenings with normal results can stop after age 65. Talk about this with your provider.  Breast cancer scan (mammogram): If you've ever had breasts, begin having regular mammograms starting at age 40. This is a scan to check for breast cancer.  Colon cancer screening: It is important to start screening for colon cancer at age 45.  Have a colonoscopy test every 10 years (or more often if you're at risk) Or, ask your provider about stool tests like a FIT test every year or Cologuard test every 3 years.  To learn more about your testing options, visit: www.ChoiceMap/282878.pdf.  For help making a decision, visit: bk/sn05570.  Prostate cancer screening test: If you have a  prostate and are age 55 to 69, ask your provider if you would benefit from a yearly prostate cancer screening test.  Lung cancer screening: If you are a current or former smoker age 50 to 80, ask your care team if ongoing lung cancer screenings are right for you.  For informational purposes only. Not to replace the advice of your health care provider. Copyright   2023 [facility]. All rights reserved. Clinically reviewed by the  [facility]. Dispop [id number] - REV 04/24.  Hearing Loss: Care Instructions  Overview     Hearing loss is a sudden or slow decrease in how well you hear. It can range from slight to profound. Permanent hearing loss can occur with aging. It also can happen when you are exposed long-term to loud noise. Examples include listening to loud music, riding motorcycles, or being around other loud machines.  Hearing loss can affect your work and home life. It can make you feel lonely or depressed. You may feel that you have lost your independence. But hearing aids and other devices can help you hear better and feel connected to others.  Follow-up care is a key part of your treatment and safety. Be sure to make and go to all appointments, and call your doctor if you are having problems. It's also a good idea to know your test results and keep a list of the medicines you take.  How can you care for yourself at home?  Avoid loud noises whenever possible. This helps keep your hearing from getting worse.  Always wear hearing protection around loud noises.  Wear a hearing aid as directed.  A professional can help you pick a hearing aid that will work best for you.  You can also get hearing aids over the counter for mild to moderate hearing loss.  Have hearing tests as your doctor suggests. They can show whether your hearing has changed. Your hearing aid may need to be adjusted.  Use other devices as needed. These may include:  Telephone amplifiers and hearing aids that  "can connect to a television, stereo, radio, or microphone.  Devices that use lights or vibrations. These alert you to the doorbell, a ringing telephone, or a baby monitor.  Television closed-captioning. This shows the words at the bottom of the screen. Most new TVs can do this.  TTY (text telephone). This lets you type messages back and forth on the telephone instead of talking or listening. These devices are also called TDD. When messages are typed on the keyboard, they are sent over the phone line to a receiving TTY. The message is shown on a monitor.  Use text messaging, social media, and email if it is hard for you to communicate by telephone.  Try to learn a listening technique called speechreading. It is not lipreading. You pay attention to people's gestures, expressions, posture, and tone of voice. These clues can help you understand what a person is saying. Face the person you are talking to, and have them face you. Make sure the lighting is good. You need to see the other person's face clearly.  Think about counseling if you need help to adjust to your hearing loss.  When should you call for help?  Watch closely for changes in your health, and be sure to contact your doctor if:    You think your hearing is getting worse.     You have new symptoms, such as dizziness or nausea.   Where can you learn more?  Go to https://www.[employer].net/patiented  Enter R798 in the search box to learn more about \"Hearing Loss: Care Instructions.\"  Current as of: September 27, 2023               Content Version: 14.0    6401-8202 [email or web address].   Care instructions adapted under license by your healthcare professional. If you have questions about a medical condition or this instruction, always ask your healthcare professional. [email or web address] disclaims any warranty or liability for your use of this information.      Learning About Sleeping Well  What does sleeping well mean?     Sleeping well means getting " enough sleep to feel good and stay healthy. How much sleep is enough varies among people.  The number of hours you sleep and how you feel when you wake up are both important. If you do not feel refreshed, you probably need more sleep. Another sign of not getting enough sleep is feeling tired during the day.  Experts recommend that adults get at least 7 or more hours of sleep per day. Children and older adults need more sleep.  Why is getting enough sleep important?  Getting enough quality sleep is a basic part of good health. When your sleep suffers, your physical health, mood, and your thoughts can suffer too. You may find yourself feeling more grumpy or stressed. Not getting enough sleep also can lead to serious problems, including injury, accidents, anxiety, and depression.  What might cause poor sleeping?  Many things can cause sleep problems, including:  Changes to your sleep schedule.  Stress. Stress can be caused by fear about a single event, such as giving a speech. Or you may have ongoing stress, such as worry about work or school.  Depression, anxiety, and other mental or emotional conditions.  Changes in your sleep habits or surroundings. This includes changes that happen where you sleep, such as noise, light, or sleeping in a different bed. It also includes changes in your sleep pattern, such as having jet lag or working a late shift.  Health problems, such as pain, breathing problems, and restless legs syndrome.  Lack of regular exercise.  Using alcohol, nicotine, or caffeine before bed.  How can you help yourself?  Here are some tips that may help you sleep more soundly and wake up feeling more refreshed.  Your sleeping area   Use your bedroom only for sleeping and sex. A bit of light reading may help you fall asleep. But if it doesn't, do your reading elsewhere in the house. Try not to use your TV, computer, smartphone, or tablet while you are in bed.  Be sure your bed is big enough to stretch out  "comfortably, especially if you have a sleep partner.  Keep your bedroom quiet, dark, and cool. Use curtains, blinds, or a sleep mask to block out light. To block out noise, use earplugs, soothing music, or a \"white noise\" machine.  Your evening and bedtime routine   Create a relaxing bedtime routine. You might want to take a warm shower or bath, or listen to soothing music.  Go to bed at the same time every night. And get up at the same time every morning, even if you feel tired.  What to avoid   Limit caffeine (coffee, tea, caffeinated sodas) during the day, and don't have any for at least 6 hours before bedtime.  Avoid drinking alcohol before bedtime. Alcohol can cause you to wake up more often during the night.  Try not to smoke or use tobacco, especially in the evening. Nicotine can keep you awake.  Limit naps during the day, especially close to bedtime.  Avoid lying in bed awake for too long. If you can't fall asleep or if you wake up in the middle of the night and can't get back to sleep within about 20 minutes, get out of bed and go to another room until you feel sleepy.  Avoid taking medicine right before bed that may keep you awake or make you feel hyper or energized. Your doctor can tell you if your medicine may do this and if you can take it earlier in the day.  If you can't sleep   Imagine yourself in a peaceful, pleasant scene. Focus on the details and feelings of being in a place that is relaxing.  Get up and do a quiet or boring activity until you feel sleepy.  Avoid drinking any liquids before going to bed to help prevent waking up often to use the bathroom.  Where can you learn more?  Go to https://www.[employer].net/patiented  Enter J942 in the search box to learn more about \"Learning About Sleeping Well.\"  Current as of: July 10, 2023               Content Version: 14.0    3493-0706 [employer].   Care instructions adapted under license by your healthcare professional. If you have " questions about a medical condition or this instruction, always ask your healthcare professional. [email or web address] disclaims any warranty or liability for your use of this information.      Fatigue  Get Echo  You will be mailed a heart monitor to wear for 3 days  Recommend sleep study    Fibromyalgia  Start Duloxetine (Cymbalta) 20 mg once daily  Follow-up in ~2-3 months (virtual or face to face) - make appointment now  Refill of xanax      [facility] Maintenance  Due for pulmonary nodule follow-up.  Radiology test was ordered.  Please call [phone] to schedule.

## 2024-06-19 NOTE — PROGRESS NOTES
Preventive Care Visit  [facility]  Nora Hernandez DO, Internal Medicine  Jun 19, 2024      Assessment & Plan     Encounter for Medicare annual wellness exam    Chronic fatigue  Long standing problem.  Lab workup on multiple previous occasions has been unrevealing.  May be related to fibromyalgia?  Sleep apnea?  Has not had cardiac workup in many years.  Get echo to eval for CHF or valvular disease.  Get ZIO to evaluate for arrhythmia.  Start medication for fibromyalgia as below   - Adult Sleep Eval & Management  Referral; Future  - Echocardiogram Complete; Future  - ZIO PATCH MAIL OUT; Future    Fibromyalgia  may contribute to fatigue?  Also has suboptimally controlled anxiety.  Has not tried duloxetine.  Follow-up in 2-3 months to evaluate for need for dose increase  - DULoxetine (CYMBALTA) 20 MG capsule; Take 1 capsule (20 mg) by mouth daily    Benign essential hypertension   - stable, refill provided  - carvedilol (COREG) 6.25 MG tablet; Take 1 tablet (6.25 mg) by mouth 2 times daily (with meals)  - lisinopril-hydrochlorothiazide (ZESTORETIC) 20-12.5 MG tablet; Take 2 tablets by mouth daily  - OFFICE/OUTPT VISIT,EST,LEVL IV  - Echocardiogram Complete; Future  - ZIO PATCH MAIL OUT; Future    Gastroesophageal reflux disease without esophagitis   - stable, refill provided  - famotidine (PEPCID) 40 MG tablet; Take 1 tablet (40 mg) by mouth at bedtime  - OFFICE/OUTPT VISIT,EST,LEVL IV    Acquired hypothyroidism   - stable, refill provided  - levothyroxine (SYNTHROID/LEVOTHROID) 88 MCG tablet; Take 1 tablet (88 mcg) by mouth daily  - OFFICE/OUTPT VISIT,EST,LEVL IV    CKD (chronic kidney disease) stage 4, GFR 15-29 ml/min (H)  Follows with nephrology  - lisinopril-hydrochlorothiazide (ZESTORETIC) 20-12.5 MG tablet; Take 2 tablets by mouth daily  - OFFICE/OUTPT VISIT,EST,LEVL IV    BRISSA (generalized anxiety disorder)  Incompletely controlled.  Will start the duloxetine.  At  "follow-up visit in 2-3 months, we will discuss weaning down on the alprazolam as this may also contribute to fatigue  - ALPRAZolam (XANAX) 0.5 MG tablet; Take 1 tablet (0.5 mg) by mouth 2 times daily as needed for anxiety  - OFFICE/OUTPT VISIT,EST,LEVL IV  - DULoxetine (CYMBALTA) 20 MG capsule; Take 1 capsule (20 mg) by mouth daily    Secondary renal hyperparathyroidism (H24)  Known issue that I take into account for their medical decisions, no current exacerbations or new concerns    Chronic gout due to renal impairment of multiple sites without tophus  Follows with nephrology    Pulmonary nodules  Due for follow-up imaging  - CT Chest w/o Contrast; Future    Patient has been advised of split billing requirements and indicates understanding: Yes        Nicotine/Tobacco Cessation  She reports that she has been smoking cigarettes. She started smoking about 61 years ago. She has a 30.7 pack-year smoking history. She has never used smokeless tobacco.  Nicotine/Tobacco Cessation Plan  Information offered: Patient not interested at this time      BMI  Estimated body mass index is 29.7 kg/m  as calculated from the following:    Height as of this encounter: 1.645 m (5' 4.76\").    Weight as of this encounter: 80.4 kg (177 lb 3.2 oz).       Counseling  Appropriate preventive services were discussed with this patient, including applicable screening as appropriate for fall prevention, nutrition, physical activity, Tobacco-use cessation, weight loss and cognition.  Checklist reviewing preventive services available has been given to the patient.  Reviewed patient's diet, addressing concerns and/or questions.   The patient was instructed to see the dentist every 6 months.   Discussed possible causes of fatigue. The patient was provided with written information regarding signs of hearing loss.           Lana Sheth is a 78 year old, presenting for the following:  Thyroid Disease, Hypertension, AWV, and Health Maintenance " (No shots )        6/13/2023     8:25 AM   Additional Questions   Roomed by dilia mays   Accompanied by self         6/19/2024     1:53 PM   Patient Reported Additional Medications   Patient reports taking the following new medications allopurinol (ZYLOPRIM) 100 MG tablet New    , CO-Q 10 100 mg daily  Iron 65 mg daily   VIT D3 2000 units daily               Health Care Directive  Patient has a Health Care Directive on file  Advance care planning document is on file and is current.    HPI      Chief Complaint   Patient presents with    Thyroid Disease    Hypertension    AWV    Health Maintenance     No shots          Hypertension Follow-up    Do you check your blood pressure regularly outside of the clinic? No   Are you following a low salt diet? Yes  Are your blood pressures ever more than 140 on the top number (systolic) OR more than 90 on the bottom number (diastolic), for example 140/90? No    Hypothyroidism Follow-up    Since last visit, patient describes the following symptoms: Weight stable, no hair loss, no skin changes, no constipation, no loose stools      Chronic Kidney Disease Follow-up    Do you take any over the counter pain medicine?: No  Following with [facility]  They have her on allopurinol for gout  CKD thought due to hypertension   SGLT2i not thought to be beneficial due to lack of proteinuria    Fatigue  --decades long problem  --Reports fatigue, low energy and 'doesn't feel well'  --history of fibromyalgia and struggles with various aches/pains; never on treatment for this  --overall aches/pains are managed;  refined sugar makes her symptoms worse  --she reports feeling 'old' and generally weak, low ambition  --doesn't think her symptoms are due to a mental health issue  --has not seen sleep doc  --no problems falling or staying asleep; is a night owl; however, has lifelong sleep onset insomnia  --can fall asleep easily while watching  TV or reading a book  --does not have a bed partner, doesn't  "snore to her knowledge  --she reports she 'breathes shallow'  --has non-restorative sleep; \"mornings are my worst time\"  --no dizziness, headache, fevers, N/V/D  --reports new cold intolerance  --she reports 'stomach ache' - diffuse, centralized, constant  --occ constipation, managed by diet changes. Lfelone issue    Anxiety  --has been on buspar, lexapro, hydroxyzine, zoloft, effexor  --taking xanax 1 x day, rarely BID    Chest pain  --she mentioned to nephrology at last visit a few months ago  --she describes the chest pain as 'dry windpipe; and irritating my esophagus  --it is exertional but gets better with prolonged walking        6/19/2024   General Health   How would you rate your overall physical health? (!) FAIR   Feel stress (tense, anxious, or unable to sleep) Not at all            6/19/2024   Nutrition   Diet: Regular (no restrictions)            6/19/2024   Exercise   Days per week of moderate/strenous exercise 0 days   Average minutes spent exercising at this level 0 min      (!) EXERCISE CONCERN      6/19/2024   Social Factors   Frequency of gathering with friends or relatives More than three times a week   Worry food won't last until get money to buy more No   Food not last or not have enough money for food? No   Do you have housing? (Housing is defined as stable permanent housing and does not include staying ouside in a car, in a tent, in an abandoned building, in an overnight shelter, or couch-surfing.) Yes   Are you worried about losing your housing? No   Lack of transportation? No   Unable to get utilities (heat,electricity)? No            6/19/2024   Fall Risk   Fallen 2 or more times in the past year? No    No   Trouble with walking or balance? No    No       Multiple values from one day are sorted in reverse-chronological order          6/19/2024   Activities of Daily Living- Home Safety   Needs help with the following daily activites None of the above   Safety concerns in the home None of the " above            6/19/2024   Dental   Dentist two times every year? (!) NO            6/19/2024   Hearing Screening   Hearing concerns? (!) I FEEL THAT PEOPLE ARE MUMBLING OR NOT SPEAKING CLEARLY.    (!) I NEED TO ASK PEOPLE TO SPEAK UP OR REPEAT THEMSELVES.    (!) IT'S HARDER TO UNDERSTAND WOMEN'S VOICES THAN MEN'S VOICES.    (!) IT'S HARD TO FOLLOW A CONVERSATION IN A NOISY RESTAURANT OR CROWDED ROOM.    (!) TROUBLE UNDESTANDING A SPEAKER IN A PUBLIC MEETING OR [employer] SERVICE.    (!) TROUBLE FOLLOWING DIALOGUE IN THE THEATHER.    (!) TROUBLE UNDERSTANDING SOFT OR WHISPERED SPEECH.       Multiple values from one day are sorted in reverse-chronological order         6/19/2024   Driving Risk Screening   Patient/family members have concerns about driving No            6/19/2024   General Alertness/Fatigue Screening   Have you been more tired than usual lately? (!) YES            6/19/2024   Urinary Incontinence Screening   Bothered by leaking urine in past 6 months No            6/19/2024   TB Screening   Were you born outside of the [place]? No            Today's PHQ-2 Score:       6/19/2024     1:51 PM   PHQ-2 ( 1999 [employer])   Q1: Little interest or pleasure in doing things 0   Q2: Feeling down, depressed or hopeless 0   PHQ-2 Score 0   Q1: Little interest or pleasure in doing things Not at all   Q2: Feeling down, depressed or hopeless Not at all   PHQ-2 Score 0           6/19/2024   Substance Use   Alcohol more than 3/day or more than 7/wk No   Do you have a current opioid prescription? No   How severe/bad is pain from 1 to 10? 0/10 (No Pain)   Do you use any other substances recreationally? No        Social History     Tobacco Use    Smoking status: Every Day     Current packs/day: 0.50     Average packs/day: 0.5 packs/day for 61.5 years (30.7 ttl pk-yrs)     Types: Cigarettes     Start date: 1/1/1963    Smokeless tobacco: Never   Vaping Use    Vaping status: Never Used   Substance Use Topics    Alcohol use: No     Drug use: No          Mammogram Screening - After age 74- determine frequency with patient based on health status, life expectancy and patient goals              Reviewed and updated as needed this visit by Provider      Problems               Current Outpatient Medications   Medication Sig Dispense Refill    allopurinol (ZYLOPRIM) 100 MG tablet Take 100 mg by mouth daily      ALPRAZolam (XANAX) 0.5 MG tablet Take 1 tablet (0.5 mg) by mouth 2 times daily as needed for anxiety 60 tablet 5    aspirin 81 MG tablet Take  by mouth daily.  3    carvedilol (COREG) 6.25 MG tablet Take 1 tablet (6.25 mg) by mouth 2 times daily (with meals) 180 tablet 3    cholecalciferol 5000 UNITS CAPS Take 1 tablet by mouth daily. 30 capsule     coenzyme Q-10 capsule Take 1 capsule by mouth daily      Cranberry 1000 MG CAPS       famotidine (PEPCID) 40 MG tablet Take 1 tablet (40 mg) by mouth At Bedtime 90 tablet 3    ferrous sulfate (FEROSUL) 325 (65 Fe) MG tablet Take 325 mg by mouth daily (with breakfast)      Krill Oil 300 MG CAPS Take 350 mg by mouth daily      levothyroxine (SYNTHROID/LEVOTHROID) 88 MCG tablet Take 1 tablet (88 mcg) by mouth daily 90 tablet 3    lisinopril-hydrochlorothiazide (ZESTORETIC) 20-12.5 MG tablet Take 2 tablets by mouth daily 180 tablet 3    potassium citrate 15 MEQ (1620 MG) TBCR Take 15 mEq by mouth 2 times daily      STATIN NOT PRESCRIBED (INTENTIONAL) Please choose reason not prescribed from choices below.      Vitamin D3 (VITAMIN D, CHOLECALCIFEROL,) 25 mcg (1000 units) tablet Take 50 mcg by mouth daily      omega 3 1000 MG CAPS Take by mouth daily      Turmeric 500 MG CAPS        Current providers sharing in care for this patient include:  Patient Care Team:  Nora Hernandez DO as PCP - General (Internal Medicine)  Nora Hernandez DO as Assigned PCP    The following health maintenance items are reviewed in Epic and correct as of today:  Health Maintenance   Topic Date Due     "NICOTINE/TOBACCO CESSATION COUNSELING Q 1 YR  Never done    ANNUAL REVIEW OF HM ORDERS  06/13/2024    BMP  07/17/2024    COVID-19 Vaccine (8 - 2023-24 season) 09/02/2024 (Originally 2/11/2024)    MICROALBUMIN  10/17/2024    HEMOGLOBIN  10/17/2024    LIPID  04/17/2025    TSH W/FREE T4 REFLEX  04/17/2025    MEDICARE ANNUAL WELLNESS VISIT  06/19/2025    FALL RISK ASSESSMENT  06/19/2025    DTAP/TDAP/TD IMMUNIZATION (3 - Td or Tdap) 01/25/2027    GLUCOSE  04/17/2027    ADVANCE CARE PLANNING  06/13/2028    DEXA  07/08/2037    PARATHYROID  Completed    PHOSPHORUS  Completed    PHQ-2 (once per calendar year)  Completed    INFLUENZA VACCINE  Completed    Pneumococcal Vaccine: 65+ Years  Completed    URINALYSIS  Completed    ALK PHOS  Completed    ZOSTER IMMUNIZATION  Completed    RSV VACCINE (Pregnancy & 60+)  Completed    IPV IMMUNIZATION  Aged Out    HPV IMMUNIZATION  Aged Out    MENINGITIS IMMUNIZATION  Aged Out    RSV MONOCLONAL ANTIBODY  Aged Out    COLORECTAL CANCER SCREENING  Discontinued    LUNG CANCER SCREENING  Discontinued         Review of Systems  Constitutional, neuro, ENT, endocrine, pulmonary, cardiac, gastrointestinal, genitourinary, musculoskeletal, integument and psychiatric systems are negative, except as otherwise noted.     Objective    Exam  /70 (BP Location: Right arm, Patient Position: Sitting, Cuff Size: Adult Regular)   Pulse 59   Temp 97  F (36.1  C) (Tympanic)   Resp 14   Ht 1.645 m (5' 4.76\")   Wt 80.4 kg (177 lb 3.2 oz)   SpO2 97%   BMI 29.70 kg/m     Estimated body mass index is 29.7 kg/m  as calculated from the following:    Height as of this encounter: 1.645 m (5' 4.76\").    Weight as of this encounter: 80.4 kg (177 lb 3.2 oz).    Physical Exam  GENERAL: alert and no distress  EYES: Eyes grossly normal to inspection, PERRL and conjunctivae and sclerae normal  HENT: ear canals and TM's normal, nose and mouth without ulcers or lesions  NECK: no adenopathy, no asymmetry, masses, or " scars  RESP: lungs clear to auscultation - no rales, rhonchi or wheezes  CV: regular rate and rhythm, normal S1 S2, no S3 or S4, no murmur, click or rub, no peripheral edema  ABDOMEN: soft, nontender, no hepatosplenomegaly, no masses and bowel sounds normal  MS: no gross musculoskeletal defects noted, no edema  SKIN: no suspicious lesions or rashes  NEURO: Normal strength and tone, mentation intact and speech normal  PSYCH: mentation appears normal, affect normal/bright         6/19/2024   Mini Cog   Clock Draw Score 2 Normal   3 Item Recall 3 objects recalled   Mini Cog Total Score 5                 Signed Electronically by: Nora Hernandez DO

## 2024-06-20 ENCOUNTER — ORDERS ONLY (AUTO-RELEASED) (OUTPATIENT)
Dept: FAMILY MEDICINE | Facility: CLINIC | Age: 79
End: 2024-06-20
Payer: COMMERCIAL

## 2024-06-20 DIAGNOSIS — R53.82 CHRONIC FATIGUE: ICD-10-CM

## 2024-06-20 DIAGNOSIS — I10 BENIGN ESSENTIAL HYPERTENSION: Chronic | ICD-10-CM

## 2024-07-12 PROCEDURE — 93244 EXT ECG>48HR<7D REV&INTERPJ: CPT | Performed by: INTERNAL MEDICINE

## 2024-07-18 ENCOUNTER — HOSPITAL ENCOUNTER (OUTPATIENT)
Dept: CARDIOLOGY | Facility: CLINIC | Age: 79
Discharge: HOME | End: 2024-07-18
Attending: INTERNAL MEDICINE | Admitting: INTERNAL MEDICINE
Payer: COMMERCIAL

## 2024-07-18 DIAGNOSIS — R53.82 CHRONIC FATIGUE: ICD-10-CM

## 2024-07-18 DIAGNOSIS — I10 BENIGN ESSENTIAL HYPERTENSION: Chronic | ICD-10-CM

## 2024-07-18 LAB — LVEF ECHO: NORMAL

## 2024-07-18 PROCEDURE — 93306 TTE W/DOPPLER COMPLETE: CPT

## 2024-07-18 PROCEDURE — 93306 TTE W/DOPPLER COMPLETE: CPT | Mod: 26 | Performed by: INTERNAL MEDICINE

## 2024-07-24 ENCOUNTER — HOSPITAL ENCOUNTER (OUTPATIENT)
Dept: CT IMAGING | Facility: CLINIC | Age: 79
Discharge: HOME | End: 2024-07-24
Attending: INTERNAL MEDICINE | Admitting: INTERNAL MEDICINE
Payer: COMMERCIAL

## 2024-07-24 DIAGNOSIS — R91.8 PULMONARY NODULES: ICD-10-CM

## 2024-07-24 PROCEDURE — 71250 CT THORAX DX C-: CPT

## 2024-08-13 ENCOUNTER — OFFICE VISIT (OUTPATIENT)
Dept: FAMILY MEDICINE | Facility: CLINIC | Age: 79
End: 2024-08-13
Payer: COMMERCIAL

## 2024-08-13 VITALS
HEIGHT: 65 IN | DIASTOLIC BLOOD PRESSURE: 76 MMHG | WEIGHT: 176.6 LBS | OXYGEN SATURATION: 98 % | SYSTOLIC BLOOD PRESSURE: 124 MMHG | HEART RATE: 51 BPM | RESPIRATION RATE: 12 BRPM | TEMPERATURE: 97 F | BODY MASS INDEX: 29.42 KG/M2

## 2024-08-13 DIAGNOSIS — R53.82 CHRONIC FATIGUE: ICD-10-CM

## 2024-08-13 DIAGNOSIS — M79.7 FIBROMYALGIA: Primary | ICD-10-CM

## 2024-08-13 DIAGNOSIS — N18.4 CKD (CHRONIC KIDNEY DISEASE) STAGE 4, GFR 15-29 ML/MIN (H): ICD-10-CM

## 2024-08-13 PROBLEM — R91.8 PULMONARY NODULES: Status: RESOLVED | Noted: 2023-07-18 | Resolved: 2024-08-13

## 2024-08-13 PROCEDURE — G2211 COMPLEX E/M VISIT ADD ON: HCPCS | Performed by: INTERNAL MEDICINE

## 2024-08-13 PROCEDURE — 96127 BRIEF EMOTIONAL/BEHAV ASSMT: CPT | Performed by: INTERNAL MEDICINE

## 2024-08-13 PROCEDURE — 99214 OFFICE O/P EST MOD 30 MIN: CPT | Performed by: INTERNAL MEDICINE

## 2024-08-13 ASSESSMENT — ANXIETY QUESTIONNAIRES
6. BECOMING EASILY ANNOYED OR IRRITABLE: NOT AT ALL
7. FEELING AFRAID AS IF SOMETHING AWFUL MIGHT HAPPEN: NOT AT ALL
GAD7 TOTAL SCORE: 0
GAD7 TOTAL SCORE: 0
3. WORRYING TOO MUCH ABOUT DIFFERENT THINGS: NOT AT ALL
1. FEELING NERVOUS, ANXIOUS, OR ON EDGE: NOT AT ALL
IF YOU CHECKED OFF ANY PROBLEMS ON THIS QUESTIONNAIRE, HOW DIFFICULT HAVE THESE PROBLEMS MADE IT FOR YOU TO DO YOUR WORK, TAKE CARE OF THINGS AT HOME, OR GET ALONG WITH OTHER PEOPLE: NOT DIFFICULT AT ALL
5. BEING SO RESTLESS THAT IT IS HARD TO SIT STILL: NOT AT ALL
2. NOT BEING ABLE TO STOP OR CONTROL WORRYING: NOT AT ALL

## 2024-08-13 ASSESSMENT — PAIN SCALES - GENERAL: PAINLEVEL: NO PAIN (0)

## 2024-08-13 ASSESSMENT — PATIENT HEALTH QUESTIONNAIRE - PHQ9
5. POOR APPETITE OR OVEREATING: NOT AT ALL
SUM OF ALL RESPONSES TO PHQ QUESTIONS 1-9: 4

## 2024-08-13 NOTE — PROGRESS NOTES
Assessment & Plan     Fibromyalgia  Fibromyalgia pain appears improved with the duloxetine.  Still struggling with low energy and sleep but feels that this is improving as well.  We discussed increasing the dose of duloxetine from 20 to 30 mg, but she opts to continue unchanged for now. She can contact me if she changes her mind    CKD (chronic kidney disease) stage 4, GFR 15-29 ml/min (H)  Due for lab  - BASIC METABOLIC PANEL; Future  - Hemoglobin; Future    Chronic fatigue  Extensive workup including thyroid, B12, echo, Zio patch unrevealing.  Thought due to mental health and fibromyalgia-somewhat improved with duloxetine            Patient Instructions   Fatigue  Fibromyalgia  Anxiety  We discussed increase dose of duloxetine from 20 to 30 mg but you want to continue 20 mg for now  If you change your mind, let me know - can call  Heart testing was normal  Keep up the good work trying to move/exercise more    Subjective   Meryl is a 79 year old, presenting for the following health issues:  Hypertension, Thyroid Disease, Depression, and Anxiety        8/13/2024    12:36 PM   Additional Questions   Roomed by dilia   Accompanied by self         8/13/2024    12:36 PM   Patient Reported Additional Medications   Patient reports taking the following new medications none     History of Present Illness       Hypertension: She presents for follow up of hypertension.  She does not check blood pressure  regularly outside of the clinic. Outpatient blood pressures have not been over 140/90. She follows a low salt diet.     She eats 2-3 servings of fruits and vegetables daily.She consumes 0 sweetened beverage(s) daily.She exercises with enough effort to increase her heart rate 10 to 19 minutes per day.  She exercises with enough effort to increase her heart rate 3 or less days per week.   She is taking medications regularly.       Chief Complaint   Patient presents with    Hypertension    Thyroid Disease    Depression     "Anxiety     Fatigue  -- She has been plagued by chronic fatigue for many years.  --I pursued full cardiac workup with echo and Zio patch both with which were negative.  I referred her to the sleep clinic but she declined to schedule  See note from last visit 6/19 as below  -- I started her on duloxetine 20 mg once daily  --this has helped a lot with diffuse pain.  Has not seen improvement with fatigue.  --doesn't think it has helped with anxiety;  feels a bit more anxious at night if anything  --no side effects to cymbalta  --is sleeping a bit better - longer, less wake up time  --\smart watch is motiving her to move more    --decades long problem  --Reports fatigue, low energy and 'doesn't feel well'  --history of fibromyalgia and struggles with various aches/pains; never on treatment for this  --overall aches/pains are managed;  refined sugar makes her symptoms worse  --she reports feeling 'old' and generally weak, low ambition  --doesn't think her symptoms are due to a mental health issue  --has not seen sleep doc  --no problems falling or staying asleep; is a night owl; however, has lifelong sleep onset insomnia  --can fall asleep easily while watching  TV or reading a book  --does not have a bed partner, doesn't snore to her knowledge  --she reports she 'breathes shallow'  --has non-restorative sleep; \"mornings are my worst time\"  --no dizziness, headache, fevers, N/V/D  --reports new cold intolerance  --she reports 'stomach ache' - diffuse, centralized, constant  --occ constipation, managed by diet changes. Lfelone issue    Hypertension Follow-up    Do you check your blood pressure regularly outside of the clinic? No   Are you following a low salt diet? Yes  Are your blood pressures ever more than 140 on the top number (systolic) OR more than 90 on the bottom number (diastolic), for example 140/90? No    Depression and Anxiety   How are you doing with your depression since your last visit? No change  How are " you doing with your anxiety since your last visit?  No change  Are you having other symptoms that might be associated with depression or anxiety? No  Have you had a significant life event? No   Do you have any concerns with your use of alcohol or other drugs? No    Social History     Tobacco Use    Smoking status: Every Day     Current packs/day: 0.50     Average packs/day: 0.5 packs/day for 61.6 years (30.8 ttl pk-yrs)     Types: Cigarettes     Start date: 1/1/1963    Smokeless tobacco: Never   Vaping Use    Vaping status: Never Used   Substance Use Topics    Alcohol use: No    Drug use: No         2/7/2019    10:27 AM 8/7/2019    11:41 AM 8/13/2024    12:46 PM   PHQ   PHQ-9 Total Score 7 7 4   Q9: Thoughts of better off dead/self-harm past 2 weeks Not at all Not at all Not at all         5/1/2018    11:14 AM 2/7/2019    10:28 AM 8/13/2024    12:46 PM   BRISSA-7 SCORE   Total Score 3 15 0         8/13/2024    12:46 PM   Last PHQ-9   1.  Little interest or pleasure in doing things 0   2.  Feeling down, depressed, or hopeless 0   3.  Trouble falling or staying asleep, or sleeping too much 0   4.  Feeling tired or having little energy 1   5.  Poor appetite or overeating 3   6.  Feeling bad about yourself 0   7.  Trouble concentrating 0   8.  Moving slowly or restless 0   Q9: Thoughts of better off dead/self-harm past 2 weeks 0   PHQ-9 Total Score 4   Difficulty at work, home, or with people Not difficult at all         8/13/2024    12:46 PM   BRISSA-7    1. Feeling nervous, anxious, or on edge 0   2. Not being able to stop or control worrying 0   3. Worrying too much about different things 0   4. Trouble relaxing 0   5. Being so restless that it is hard to sit still 0   6. Becoming easily annoyed or irritable 0   7. Feeling afraid, as if something awful might happen 0   BRISSA-7 Total Score 0   If you checked any problems, how difficult have they made it for you to do your work, take care of things at home, or get along with  other people? Not difficult at all     }  Hypothyroidism Follow-up    Since last visit, patient describes the following symptoms: Weight stable, no hair loss, no skin changes, no constipation, no loose stools  How many servings of fruits and vegetables do you eat daily?  2-3  On average, how many sweetened beverages do you drink each day (Examples: soda, juice, sweet tea, etc.  Do NOT count diet or artificially sweetened beverages)?   0  How many days per week do you exercise enough to make your heart beat faster? 3 or less  How many minutes a day do you exercise enough to make your heart beat faster? 10 - 19  How many days per week do you miss taking your medication? 0      Current Outpatient Medications   Medication Sig Dispense Refill    allopurinol (ZYLOPRIM) 100 MG tablet Take 100 mg by mouth daily      ALPRAZolam (XANAX) 0.5 MG tablet Take 1 tablet (0.5 mg) by mouth 2 times daily as needed for anxiety 60 tablet 5    aspirin 81 MG tablet Take  by mouth daily.  3    carvedilol (COREG) 6.25 MG tablet Take 1 tablet (6.25 mg) by mouth 2 times daily (with meals) 180 tablet 3    coenzyme Q-10 capsule Take 1 capsule by mouth daily      Cranberry 1000 MG CAPS       DULoxetine (CYMBALTA) 20 MG capsule Take 1 capsule (20 mg) by mouth daily 90 capsule 3    famotidine (PEPCID) 40 MG tablet Take 1 tablet (40 mg) by mouth at bedtime 90 tablet 3    ferrous sulfate (FEROSUL) 325 (65 Fe) MG tablet Take 325 mg by mouth daily (with breakfast)      Krill Oil 300 MG CAPS Take 350 mg by mouth daily      levothyroxine (SYNTHROID/LEVOTHROID) 88 MCG tablet Take 1 tablet (88 mcg) by mouth daily 90 tablet 3    lisinopril-hydrochlorothiazide (ZESTORETIC) 20-12.5 MG tablet Take 2 tablets by mouth daily 180 tablet 3    potassium citrate 15 MEQ (1620 MG) TBCR Take 15 mEq by mouth 2 times daily      STATIN NOT PRESCRIBED (INTENTIONAL) Please choose reason not prescribed from choices below.      Vitamin D3 (VITAMIN D, CHOLECALCIFEROL,) 25 mcg  "(1000 units) tablet Take 50 mcg by mouth daily             Review of Systems  Constitutional, HEENT, cardiovascular, pulmonary, gi and gu systems are negative, except as otherwise noted.      Objective    /76 (BP Location: Right arm, Patient Position: Sitting, Cuff Size: Adult Regular)   Pulse 51   Temp 97  F (36.1  C) (Tympanic)   Resp 12   Ht 1.645 m (5' 4.76\")   Wt 80.1 kg (176 lb 9.6 oz)   SpO2 98%   BMI 29.60 kg/m    Body mass index is 29.6 kg/m .  Physical Exam   GENERAL: alert and no distress  PSYCH: mentation appears normal, affect normal/brighter than previous visits            Signed Electronically by: Nora Hernandez DO    "

## 2024-08-13 NOTE — PATIENT INSTRUCTIONS
Fatigue  Fibromyalgia  Anxiety  We discussed increase dose of duloxetine from 20 to 30 mg but you want to continue 20 mg for now  If you change your mind, let me know - can call  Heart testing was normal  Keep up the good work trying to move/exercise more

## 2024-10-31 DIAGNOSIS — N18.4 CKD (CHRONIC KIDNEY DISEASE) STAGE 4, GFR 15-29 ML/MIN (H): ICD-10-CM

## 2024-10-31 DIAGNOSIS — N25.81 SECONDARY HYPERPARATHYROIDISM, RENAL (H): ICD-10-CM

## 2024-10-31 DIAGNOSIS — N18.4 CHRONIC KIDNEY DISEASE, STAGE IV (SEVERE) (H): Primary | ICD-10-CM

## 2024-11-07 ENCOUNTER — LAB (OUTPATIENT)
Dept: LAB | Facility: CLINIC | Age: 79
End: 2024-11-07
Payer: COMMERCIAL

## 2024-11-07 DIAGNOSIS — N18.4 CKD (CHRONIC KIDNEY DISEASE) STAGE 4, GFR 15-29 ML/MIN (H): ICD-10-CM

## 2024-11-07 DIAGNOSIS — N18.4 CHRONIC KIDNEY DISEASE, STAGE IV (SEVERE) (H): ICD-10-CM

## 2024-11-07 DIAGNOSIS — N25.81 SECONDARY HYPERPARATHYROIDISM, RENAL (H): ICD-10-CM

## 2024-11-07 LAB
ALBUMIN SERPL BCG-MCNC: 4.1 G/DL (ref 3.5–5.2)
ANION GAP SERPL CALCULATED.3IONS-SCNC: 8 MMOL/L (ref 7–15)
BUN SERPL-MCNC: 33.5 MG/DL (ref 8–23)
CALCIUM SERPL-MCNC: 10.1 MG/DL (ref 8.8–10.4)
CHLORIDE SERPL-SCNC: 104 MMOL/L (ref 98–107)
CREAT SERPL-MCNC: 1.92 MG/DL (ref 0.51–0.95)
CREAT UR-MCNC: 112.6 MG/DL
EGFRCR SERPLBLD CKD-EPI 2021: 26 ML/MIN/1.73M2
FERRITIN SERPL-MCNC: 123 NG/ML (ref 11–328)
GLUCOSE SERPL-MCNC: 104 MG/DL (ref 70–99)
HCO3 SERPL-SCNC: 28 MMOL/L (ref 22–29)
HGB BLD-MCNC: 11.8 G/DL (ref 11.7–15.7)
IRON BINDING CAPACITY (ROCHE): 273 UG/DL (ref 240–430)
IRON SATN MFR SERPL: 35 % (ref 15–46)
IRON SERPL-MCNC: 96 UG/DL (ref 37–145)
MICROALBUMIN UR-MCNC: 26.7 MG/L
MICROALBUMIN/CREAT UR: 23.71 MG/G CR (ref 0–25)
PHOSPHATE SERPL-MCNC: 3.4 MG/DL (ref 2.5–4.5)
POTASSIUM SERPL-SCNC: 5.3 MMOL/L (ref 3.4–5.3)
PTH-INTACT SERPL-MCNC: 91 PG/ML (ref 15–65)
SODIUM SERPL-SCNC: 140 MMOL/L (ref 135–145)

## 2024-11-07 PROCEDURE — 82043 UR ALBUMIN QUANTITATIVE: CPT

## 2024-11-07 PROCEDURE — 82728 ASSAY OF FERRITIN: CPT

## 2024-11-07 PROCEDURE — 83550 IRON BINDING TEST: CPT

## 2024-11-07 PROCEDURE — 36415 COLL VENOUS BLD VENIPUNCTURE: CPT

## 2024-11-07 PROCEDURE — 83970 ASSAY OF PARATHORMONE: CPT

## 2024-11-07 PROCEDURE — 82306 VITAMIN D 25 HYDROXY: CPT

## 2024-11-07 PROCEDURE — 85018 HEMOGLOBIN: CPT

## 2024-11-07 PROCEDURE — 80069 RENAL FUNCTION PANEL: CPT

## 2024-11-07 PROCEDURE — 82570 ASSAY OF URINE CREATININE: CPT

## 2024-11-07 PROCEDURE — 83540 ASSAY OF IRON: CPT

## 2024-11-08 LAB — VIT D+METAB SERPL-MCNC: 56 NG/ML (ref 20–50)

## 2025-01-01 ENCOUNTER — ANESTHESIA EVENT (OUTPATIENT)
Dept: CARDIOLOGY | Facility: HOSPITAL | Age: 80
DRG: 215 | End: 2025-01-01
Payer: COMMERCIAL

## 2025-01-01 ENCOUNTER — APPOINTMENT (OUTPATIENT)
Dept: RADIOLOGY | Facility: HOSPITAL | Age: 80
DRG: 215 | End: 2025-01-01
Attending: INTERNAL MEDICINE
Payer: COMMERCIAL

## 2025-01-01 ENCOUNTER — APPOINTMENT (OUTPATIENT)
Dept: CARDIOLOGY | Facility: HOSPITAL | Age: 80
DRG: 215 | End: 2025-01-01
Attending: INTERNAL MEDICINE
Payer: COMMERCIAL

## 2025-01-01 ENCOUNTER — ANESTHESIA (OUTPATIENT)
Dept: CARDIOLOGY | Facility: HOSPITAL | Age: 80
DRG: 215 | End: 2025-01-01
Payer: COMMERCIAL

## 2025-01-01 ENCOUNTER — HOSPITAL ENCOUNTER (INPATIENT)
Facility: HOSPITAL | Age: 80
LOS: 1 days | DRG: 215 | End: 2025-08-11
Attending: EMERGENCY MEDICINE | Admitting: INTERNAL MEDICINE
Payer: COMMERCIAL

## 2025-01-01 ENCOUNTER — APPOINTMENT (OUTPATIENT)
Dept: RADIOLOGY | Facility: HOSPITAL | Age: 80
DRG: 215 | End: 2025-01-01
Attending: EMERGENCY MEDICINE
Payer: COMMERCIAL

## 2025-01-01 VITALS
OXYGEN SATURATION: 86 % | SYSTOLIC BLOOD PRESSURE: 99 MMHG | TEMPERATURE: 97.6 F | DIASTOLIC BLOOD PRESSURE: 55 MMHG | BODY MASS INDEX: 25.61 KG/M2 | HEIGHT: 64 IN | WEIGHT: 150 LBS

## 2025-01-01 DIAGNOSIS — I21.3 ST ELEVATION MYOCARDIAL INFARCTION (STEMI), UNSPECIFIED ARTERY (H): Primary | ICD-10-CM

## 2025-01-01 DIAGNOSIS — R00.0 SINUS TACHYCARDIA: ICD-10-CM

## 2025-01-01 DIAGNOSIS — I24.9 ACS (ACUTE CORONARY SYNDROME) (H): ICD-10-CM

## 2025-01-01 DIAGNOSIS — R05.1 ACUTE COUGH: ICD-10-CM

## 2025-01-01 DIAGNOSIS — R06.2 WHEEZING: ICD-10-CM

## 2025-01-01 LAB
ABO + RH BLD: NORMAL
ACB COMPLEX DNA BLD POS QL NAA+NON-PROBE: NOT DETECTED
ACT BLD: 166 SECONDS (ref 74–150)
ACT BLD: 182 SECONDS (ref 74–150)
ACT BLD: 251 SECONDS (ref 74–150)
ACT BLD: 345 SECONDS (ref 74–150)
ACT BLD: 349 SECONDS (ref 74–150)
ALBUMIN SERPL BCG-MCNC: 2.4 G/DL (ref 3.5–5.2)
ALBUMIN SERPL BCG-MCNC: 2.4 G/DL (ref 3.5–5.2)
ALBUMIN SERPL BCG-MCNC: 2.5 G/DL (ref 3.5–5.2)
ALBUMIN SERPL BCG-MCNC: 2.6 G/DL (ref 3.5–5.2)
ALBUMIN SERPL BCG-MCNC: 3.5 G/DL (ref 3.5–5.2)
ALBUMIN UR-MCNC: 20 MG/DL
ALP SERPL-CCNC: 106 U/L (ref 40–150)
ALP SERPL-CCNC: 77 U/L (ref 40–150)
ALP SERPL-CCNC: 78 U/L (ref 40–150)
ALP SERPL-CCNC: 79 U/L (ref 40–150)
ALP SERPL-CCNC: 81 U/L (ref 40–150)
ALP SERPL-CCNC: 87 U/L (ref 40–150)
ALP SERPL-CCNC: 95 U/L (ref 40–150)
ALT SERPL W P-5'-P-CCNC: 120 U/L (ref 0–50)
ALT SERPL W P-5'-P-CCNC: 13 U/L (ref 0–50)
ALT SERPL W P-5'-P-CCNC: 1766 U/L (ref 0–50)
ALT SERPL W P-5'-P-CCNC: 2386 U/L (ref 0–50)
ALT SERPL W P-5'-P-CCNC: 2646 U/L (ref 0–50)
ALT SERPL W P-5'-P-CCNC: 2729 U/L (ref 0–50)
ALT SERPL W P-5'-P-CCNC: 2920 U/L (ref 0–50)
ANION GAP SERPL CALCULATED.3IONS-SCNC: 13 MMOL/L (ref 7–15)
ANION GAP SERPL CALCULATED.3IONS-SCNC: 14 MMOL/L (ref 7–15)
ANION GAP SERPL CALCULATED.3IONS-SCNC: 15 MMOL/L (ref 7–15)
ANION GAP SERPL CALCULATED.3IONS-SCNC: 16 MMOL/L (ref 7–15)
ANION GAP SERPL CALCULATED.3IONS-SCNC: 18 MMOL/L (ref 7–15)
ANION GAP SERPL CALCULATED.3IONS-SCNC: 19 MMOL/L (ref 7–15)
APPEARANCE UR: ABNORMAL
APTT PPP: 56 SECONDS (ref 22–38)
APTT PPP: 65 SECONDS (ref 22–38)
APTT PPP: >240 SECONDS (ref 22–38)
AST SERPL W P-5'-P-CCNC: 2484 U/L (ref 0–45)
AST SERPL W P-5'-P-CCNC: 291 U/L (ref 0–45)
AST SERPL W P-5'-P-CCNC: 3785 U/L (ref 0–45)
AST SERPL W P-5'-P-CCNC: 5185 U/L (ref 0–45)
AST SERPL W P-5'-P-CCNC: 5356 U/L (ref 0–45)
AST SERPL W P-5'-P-CCNC: 5859 U/L (ref 0–45)
AST SERPL W P-5'-P-CCNC: 77 U/L (ref 0–45)
ATRIAL RATE - MUSE: 122 BPM
B FRAGILIS DNA BLD POS QL NAA+NON-PROBE: NOT DETECTED
BACTERIA #/AREA URNS HPF: ABNORMAL /HPF
BASE EXCESS BLDA CALC-SCNC: -13.7 MMOL/L (ref -3–3)
BASE EXCESS BLDA CALC-SCNC: -16.9 MMOL/L (ref -3–3)
BASE EXCESS BLDA CALC-SCNC: -3.3 MMOL/L (ref -3–3)
BASE EXCESS BLDA CALC-SCNC: -3.9 MMOL/L (ref -3–3)
BASE EXCESS BLDA CALC-SCNC: -4.2 MMOL/L (ref -3–3)
BASE EXCESS BLDA CALC-SCNC: -4.6 MMOL/L (ref -3–3)
BASE EXCESS BLDA CALC-SCNC: -5.6 MMOL/L (ref -3–3)
BASE EXCESS BLDA CALC-SCNC: -6.1 MMOL/L (ref -3–3)
BASE EXCESS BLDA CALC-SCNC: -7.1 MMOL/L (ref -3–3)
BASE EXCESS BLDA CALC-SCNC: -7.3 MMOL/L (ref -3–3)
BASE EXCESS BLDA CALC-SCNC: -8.1 MMOL/L (ref -3–3)
BASE EXCESS BLDV CALC-SCNC: -2.5 MMOL/L (ref -3–3)
BASE EXCESS BLDV CALC-SCNC: -3.2 MMOL/L (ref -3–3)
BASE EXCESS BLDV CALC-SCNC: -3.6 MMOL/L (ref -3–3)
BASE EXCESS BLDV CALC-SCNC: -7.5 MMOL/L (ref -3–3)
BASE EXCESS BLDV CALC-SCNC: -8.4 MMOL/L (ref -3–3)
BASOPHILS # BLD AUTO: 0 10E3/UL (ref 0–0.2)
BASOPHILS NFR BLD AUTO: 0 %
BILIRUB DIRECT SERPL-MCNC: 0.8 MG/DL (ref 0–0.3)
BILIRUB DIRECT SERPL-MCNC: 0.82 MG/DL (ref 0–0.3)
BILIRUB SERPL-MCNC: 0.5 MG/DL
BILIRUB SERPL-MCNC: 0.9 MG/DL
BILIRUB SERPL-MCNC: 1 MG/DL
BILIRUB SERPL-MCNC: 1.1 MG/DL
BILIRUB SERPL-MCNC: 1.3 MG/DL
BILIRUB SERPL-MCNC: 1.3 MG/DL
BILIRUB SERPL-MCNC: 1.4 MG/DL
BILIRUB UR QL STRIP: NEGATIVE
BLACTX-M ISLT/SPM QL: NOT DETECTED
BLAIMP ISLT/SPM QL: NOT DETECTED
BLAKPC ISLT/SPM QL: NOT DETECTED
BLAOXA-48-LIKE ISLT/SPM QL: NOT DETECTED
BLAVIM ISLT/SPM QL: NOT DETECTED
BLD GP AB SCN SERPL QL: NEGATIVE
BUN SERPL-MCNC: 39.2 MG/DL (ref 8–23)
BUN SERPL-MCNC: 41.7 MG/DL (ref 8–23)
BUN SERPL-MCNC: 50.4 MG/DL (ref 8–23)
BUN SERPL-MCNC: 55 MG/DL (ref 8–23)
BUN SERPL-MCNC: 55.9 MG/DL (ref 8–23)
BUN SERPL-MCNC: 56.7 MG/DL (ref 8–23)
BUN SERPL-MCNC: 60.5 MG/DL (ref 8–23)
BUN SERPL-MCNC: 60.5 MG/DL (ref 8–23)
C ALBICANS DNA BLD POS QL NAA+NON-PROBE: NOT DETECTED
C AURIS DNA BLD POS QL NAA+NON-PROBE: NOT DETECTED
C GATTII+NEOFOR DNA BLD POS QL NAA+N-PRB: NOT DETECTED
C GLABRATA DNA BLD POS QL NAA+NON-PROBE: NOT DETECTED
C KRUSEI DNA BLD POS QL NAA+NON-PROBE: NOT DETECTED
C PARAP DNA BLD POS QL NAA+NON-PROBE: NOT DETECTED
C TROPICLS DNA BLD POS QL NAA+NON-PROBE: NOT DETECTED
CA-I BLD-MCNC: 4 MG/DL (ref 4.4–5.2)
CA-I BLD-MCNC: 4.3 MG/DL (ref 4.4–5.2)
CA-I BLD-MCNC: 4.4 MG/DL (ref 4.4–5.2)
CALCIUM SERPL-MCNC: 7.6 MG/DL (ref 8.8–10.4)
CALCIUM SERPL-MCNC: 7.6 MG/DL (ref 8.8–10.4)
CALCIUM SERPL-MCNC: 8 MG/DL (ref 8.8–10.4)
CALCIUM SERPL-MCNC: 8.1 MG/DL (ref 8.8–10.4)
CALCIUM SERPL-MCNC: 8.8 MG/DL (ref 8.8–10.4)
CALCIUM SERPL-MCNC: 8.8 MG/DL (ref 8.8–10.4)
CALCIUM SERPL-MCNC: 9 MG/DL (ref 8.8–10.4)
CALCIUM SERPL-MCNC: 9.1 MG/DL (ref 8.8–10.4)
CHLORIDE SERPL-SCNC: 100 MMOL/L (ref 98–107)
CHLORIDE SERPL-SCNC: 100 MMOL/L (ref 98–107)
CHLORIDE SERPL-SCNC: 102 MMOL/L (ref 98–107)
CHLORIDE SERPL-SCNC: 104 MMOL/L (ref 98–107)
CHLORIDE SERPL-SCNC: 92 MMOL/L (ref 98–107)
CHLORIDE SERPL-SCNC: 95 MMOL/L (ref 98–107)
CHLORIDE SERPL-SCNC: 99 MMOL/L (ref 98–107)
CHLORIDE SERPL-SCNC: 99 MMOL/L (ref 98–107)
CHOLEST SERPL-MCNC: 146 MG/DL
COLOR UR AUTO: ABNORMAL
CREAT SERPL-MCNC: 2.04 MG/DL (ref 0.51–0.95)
CREAT SERPL-MCNC: 2.08 MG/DL (ref 0.51–0.95)
CREAT SERPL-MCNC: 2.32 MG/DL (ref 0.51–0.95)
CREAT SERPL-MCNC: 2.38 MG/DL (ref 0.51–0.95)
CREAT SERPL-MCNC: 2.52 MG/DL (ref 0.51–0.95)
CREAT SERPL-MCNC: 2.73 MG/DL (ref 0.51–0.95)
CREAT SERPL-MCNC: 2.96 MG/DL (ref 0.51–0.95)
CREAT SERPL-MCNC: 2.96 MG/DL (ref 0.51–0.95)
DIASTOLIC BLOOD PRESSURE - MUSE: NORMAL MMHG
E CLOAC COMP DNA BLD POS NAA+NON-PROBE: NOT DETECTED
E COLI DNA BLD POS QL NAA+NON-PROBE: NOT DETECTED
E FAECALIS DNA BLD POS QL NAA+NON-PROBE: NOT DETECTED
E FAECIUM DNA BLD POS QL NAA+NON-PROBE: NOT DETECTED
EGFRCR SERPLBLD CKD-EPI 2021: 15 ML/MIN/1.73M2
EGFRCR SERPLBLD CKD-EPI 2021: 15 ML/MIN/1.73M2
EGFRCR SERPLBLD CKD-EPI 2021: 17 ML/MIN/1.73M2
EGFRCR SERPLBLD CKD-EPI 2021: 18 ML/MIN/1.73M2
EGFRCR SERPLBLD CKD-EPI 2021: 20 ML/MIN/1.73M2
EGFRCR SERPLBLD CKD-EPI 2021: 20 ML/MIN/1.73M2
EGFRCR SERPLBLD CKD-EPI 2021: 23 ML/MIN/1.73M2
EGFRCR SERPLBLD CKD-EPI 2021: 24 ML/MIN/1.73M2
EOSINOPHIL # BLD AUTO: 0 10E3/UL (ref 0–0.7)
EOSINOPHIL NFR BLD AUTO: 0 %
ERYTHROCYTE [DISTWIDTH] IN BLOOD BY AUTOMATED COUNT: 13 % (ref 10–15)
ERYTHROCYTE [DISTWIDTH] IN BLOOD BY AUTOMATED COUNT: 13.2 % (ref 10–15)
ERYTHROCYTE [DISTWIDTH] IN BLOOD BY AUTOMATED COUNT: 13.2 % (ref 10–15)
EST. AVERAGE GLUCOSE BLD GHB EST-MCNC: 111 MG/DL
FIBRINOGEN PPP-MCNC: 498 MG/DL (ref 170–510)
FLUAV RNA SPEC QL NAA+PROBE: NEGATIVE
FLUBV RNA RESP QL NAA+PROBE: NEGATIVE
GLUCOSE BLDC GLUCOMTR-MCNC: 126 MG/DL (ref 70–99)
GLUCOSE BLDC GLUCOMTR-MCNC: 166 MG/DL (ref 70–99)
GLUCOSE BLDC GLUCOMTR-MCNC: 198 MG/DL (ref 70–99)
GLUCOSE BLDC GLUCOMTR-MCNC: 205 MG/DL (ref 70–99)
GLUCOSE BLDC GLUCOMTR-MCNC: 225 MG/DL (ref 70–99)
GLUCOSE BLDC GLUCOMTR-MCNC: 248 MG/DL (ref 70–99)
GLUCOSE BLDC GLUCOMTR-MCNC: 257 MG/DL (ref 70–99)
GLUCOSE BLDC GLUCOMTR-MCNC: 271 MG/DL (ref 70–99)
GLUCOSE SERPL-MCNC: 117 MG/DL (ref 70–99)
GLUCOSE SERPL-MCNC: 124 MG/DL (ref 70–99)
GLUCOSE SERPL-MCNC: 137 MG/DL (ref 70–99)
GLUCOSE SERPL-MCNC: 176 MG/DL (ref 70–99)
GLUCOSE SERPL-MCNC: 212 MG/DL (ref 70–99)
GLUCOSE SERPL-MCNC: 261 MG/DL (ref 70–99)
GLUCOSE SERPL-MCNC: 267 MG/DL (ref 70–99)
GLUCOSE SERPL-MCNC: 267 MG/DL (ref 70–99)
GLUCOSE UR STRIP-MCNC: NEGATIVE MG/DL
GP B STREP DNA BLD POS QL NAA+NON-PROBE: NOT DETECTED
HAEM INFLU DNA BLD POS QL NAA+NON-PROBE: NOT DETECTED
HAPTOGLOB SERPL-MCNC: 73 MG/DL (ref 30–200)
HBA1C MFR BLD: 5.5 %
HBV SURFACE AG SERPL QL IA: NONREACTIVE
HCO3 BLD-SCNC: 18 MMOL/L (ref 21–28)
HCO3 BLD-SCNC: 19 MMOL/L (ref 21–28)
HCO3 BLD-SCNC: 20 MMOL/L (ref 21–28)
HCO3 BLD-SCNC: 21 MMOL/L (ref 21–28)
HCO3 BLDA-SCNC: 12 MMOL/L (ref 21–28)
HCO3 BLDA-SCNC: 14 MMOL/L (ref 21–28)
HCO3 BLDA-SCNC: 18 MMOL/L (ref 21–28)
HCO3 BLDV-SCNC: 17 MMOL/L (ref 21–28)
HCO3 BLDV-SCNC: 21 MMOL/L (ref 21–28)
HCO3 BLDV-SCNC: 22 MMOL/L (ref 21–28)
HCO3 BLDV-SCNC: 22 MMOL/L (ref 21–28)
HCO3 BLDV-SCNC: 23 MMOL/L (ref 21–28)
HCO3 SERPL-SCNC: 16 MMOL/L (ref 22–29)
HCO3 SERPL-SCNC: 17 MMOL/L (ref 22–29)
HCO3 SERPL-SCNC: 18 MMOL/L (ref 22–29)
HCO3 SERPL-SCNC: 20 MMOL/L (ref 22–29)
HCT VFR BLD AUTO: 19.9 % (ref 35–47)
HCT VFR BLD AUTO: 27 % (ref 35–47)
HCT VFR BLD AUTO: 29.3 % (ref 35–47)
HDLC SERPL-MCNC: 27 MG/DL
HGB BLD-MCNC: 6.7 G/DL (ref 11.7–15.7)
HGB BLD-MCNC: 6.7 G/DL (ref 11.7–15.7)
HGB BLD-MCNC: 8.4 G/DL (ref 11.7–15.7)
HGB BLD-MCNC: 8.5 G/DL (ref 11.7–15.7)
HGB BLD-MCNC: 8.5 G/DL (ref 11.7–15.7)
HGB BLD-MCNC: 8.8 G/DL (ref 11.7–15.7)
HGB BLD-MCNC: 9.6 G/DL (ref 11.7–15.7)
HGB UR QL STRIP: ABNORMAL
IMM GRANULOCYTES # BLD: 0.1 10E3/UL
IMM GRANULOCYTES NFR BLD: 1 %
INR PPP: 1.09 (ref 0.85–1.15)
INR PPP: 1.7 (ref 0.85–1.15)
INTERPRETATION ECG - MUSE: NORMAL
K OXYTOCA DNA BLD POS QL NAA+NON-PROBE: NOT DETECTED
KETONES UR STRIP-MCNC: NEGATIVE MG/DL
KLEBSIELLA SP DNA BLD POS QL NAA+NON-PRB: NOT DETECTED
KLEBSIELLA SP DNA BLD POS QL NAA+NON-PRB: NOT DETECTED
L MONOCYTOG DNA BLD POS QL NAA+NON-PROBE: NOT DETECTED
LACTATE SERPL-SCNC: 1.4 MMOL/L (ref 0.7–2)
LACTATE SERPL-SCNC: 2.5 MMOL/L (ref 0.7–2)
LACTATE SERPL-SCNC: 3.2 MMOL/L (ref 0.7–2)
LACTATE SERPL-SCNC: 3.7 MMOL/L (ref 0.7–2)
LACTATE SERPL-SCNC: 4.1 MMOL/L (ref 0.7–2)
LACTATE SERPL-SCNC: 5.3 MMOL/L (ref 0.7–2)
LACTATE SERPL-SCNC: 5.4 MMOL/L (ref 0.7–2)
LACTATE SERPL-SCNC: 5.6 MMOL/L (ref 0.7–2)
LACTATE SERPL-SCNC: 6.9 MMOL/L (ref 0.7–2)
LDLC SERPL CALC-MCNC: 100 MG/DL
LEUKOCYTE ESTERASE UR QL STRIP: NEGATIVE
LIPASE SERPL-CCNC: 20 U/L (ref 13–60)
LYMPHOCYTES # BLD AUTO: 0.4 10E3/UL (ref 0.8–5.3)
LYMPHOCYTES NFR BLD AUTO: 2 %
MAGNESIUM SERPL-MCNC: 1.6 MG/DL (ref 1.7–2.3)
MAGNESIUM SERPL-MCNC: 1.7 MG/DL (ref 1.7–2.3)
MAGNESIUM SERPL-MCNC: 2.1 MG/DL (ref 1.7–2.3)
MAGNESIUM SERPL-MCNC: 2.2 MG/DL (ref 1.7–2.3)
MCH RBC QN AUTO: 29.4 PG (ref 26.5–33)
MCH RBC QN AUTO: 29.8 PG (ref 26.5–33)
MCH RBC QN AUTO: 29.9 PG (ref 26.5–33)
MCHC RBC AUTO-ENTMCNC: 32.6 G/DL (ref 31.5–36.5)
MCHC RBC AUTO-ENTMCNC: 32.8 G/DL (ref 31.5–36.5)
MCHC RBC AUTO-ENTMCNC: 33.7 G/DL (ref 31.5–36.5)
MCV RBC AUTO: 88 FL (ref 78–100)
MCV RBC AUTO: 89 FL (ref 78–100)
MCV RBC AUTO: 89 FL (ref 78–100)
MCV RBC AUTO: 90 FL (ref 78–100)
MCV RBC AUTO: 90 FL (ref 78–100)
MCV RBC AUTO: 92 FL (ref 78–100)
MONOCYTES # BLD AUTO: 0.4 10E3/UL (ref 0–1.3)
MONOCYTES NFR BLD AUTO: 2 %
MUCOUS THREADS #/AREA URNS LPF: PRESENT /LPF
N MEN DNA BLD POS QL NAA+NON-PROBE: NOT DETECTED
NDM: NOT DETECTED
NEUTROPHILS # BLD AUTO: 16.4 10E3/UL (ref 1.6–8.3)
NEUTROPHILS NFR BLD AUTO: 95 %
NITRATE UR QL: NEGATIVE
NONHDLC SERPL-MCNC: 119 MG/DL
NRBC # BLD AUTO: 0 10E3/UL
NRBC BLD AUTO-RTO: 0 /100
NT-PROBNP SERPL-MCNC: ABNORMAL PG/ML (ref 0–624)
O2/TOTAL GAS SETTING VFR VENT: 50 %
O2/TOTAL GAS SETTING VFR VENT: 60 %
OXYHGB MFR BLDA: 81 % (ref 92–100)
OXYHGB MFR BLDA: 87 % (ref 92–100)
OXYHGB MFR BLDA: 94 % (ref 92–100)
OXYHGB MFR BLDA: 95 % (ref 92–100)
OXYHGB MFR BLDA: 96 % (ref 92–100)
OXYHGB MFR BLDA: 96 % (ref 92–100)
OXYHGB MFR BLDA: 97 % (ref 92–100)
OXYHGB MFR BLDA: 98 % (ref 92–100)
OXYHGB MFR BLDA: 99 % (ref 92–100)
OXYHGB MFR BLDV: 39 % (ref 70–75)
OXYHGB MFR BLDV: 39 % (ref 70–75)
OXYHGB MFR BLDV: 48 % (ref 70–75)
OXYHGB MFR BLDV: 48 % (ref 70–75)
OXYHGB MFR BLDV: 53 % (ref 70–75)
P AERUGINOSA DNA BLD POS NAA+NON-PROBE: NOT DETECTED
P AXIS - MUSE: 74 DEGREES
PCO2 BLD: 32 MM HG (ref 35–45)
PCO2 BLD: 33 MM HG (ref 35–45)
PCO2 BLD: 33 MM HG (ref 35–45)
PCO2 BLD: 34 MM HG (ref 35–45)
PCO2 BLD: 35 MM HG (ref 35–45)
PCO2 BLD: 36 MM HG (ref 35–45)
PCO2 BLD: 36 MM HG (ref 35–45)
PCO2 BLD: 49 MM HG (ref 35–45)
PCO2 BLDA: 33 MM HG (ref 35–45)
PCO2 BLDA: 40 MM HG (ref 35–45)
PCO2 BLDA: 49 MM HG (ref 35–45)
PCO2 BLDV: 40 MM HG (ref 40–50)
PCO2 BLDV: 42 MM HG (ref 40–50)
PCO2 BLDV: 43 MM HG (ref 40–50)
PCO2 BLDV: 54 MM HG (ref 40–50)
PCO2 BLDV: 67 MM HG (ref 40–50)
PEEP: 5 CM H2O
PH BLD: 7.21 [PH] (ref 7.35–7.45)
PH BLD: 7.32 [PH] (ref 7.35–7.45)
PH BLD: 7.34 [PH] (ref 7.35–7.45)
PH BLD: 7.36 [PH] (ref 7.35–7.45)
PH BLD: 7.38 [PH] (ref 7.35–7.45)
PH BLD: 7.39 [PH] (ref 7.35–7.45)
PH BLD: 7.39 [PH] (ref 7.35–7.45)
PH BLD: 7.41 [PH] (ref 7.35–7.45)
PH BLDA: 7.08 [PH] (ref 7.35–7.45)
PH BLDA: 7.1 [PH] (ref 7.35–7.45)
PH BLDA: 7.34 [PH] (ref 7.35–7.45)
PH BLDV: 7.11 [PH] (ref 7.32–7.43)
PH BLDV: 7.19 [PH] (ref 7.32–7.43)
PH BLDV: 7.33 [PH] (ref 7.32–7.43)
PH BLDV: 7.35 [PH] (ref 7.32–7.43)
PH BLDV: 7.36 [PH] (ref 7.32–7.43)
PH UR STRIP: 5 [PH] (ref 5–7)
PHOSPHATE SERPL-MCNC: 4.7 MG/DL (ref 2.5–4.5)
PHOSPHATE SERPL-MCNC: 5.7 MG/DL (ref 2.5–4.5)
PLATELET # BLD AUTO: 142 10E3/UL (ref 150–450)
PLATELET # BLD AUTO: 196 10E3/UL (ref 150–450)
PLATELET # BLD AUTO: 275 10E3/UL (ref 150–450)
PO2 BLD: 100 MM HG (ref 80–105)
PO2 BLD: 107 MM HG (ref 80–105)
PO2 BLD: 115 MM HG (ref 80–105)
PO2 BLD: 67 MM HG (ref 80–105)
PO2 BLD: 76 MM HG (ref 80–105)
PO2 BLD: 83 MM HG (ref 80–105)
PO2 BLD: 89 MM HG (ref 80–105)
PO2 BLD: 95 MM HG (ref 80–105)
PO2 BLDA: 234 MM HG (ref 80–105)
PO2 BLDA: 50 MM HG (ref 80–105)
PO2 BLDA: 89 MM HG (ref 80–105)
PO2 BLDV: 26 MM HG (ref 25–47)
PO2 BLDV: 29 MM HG (ref 25–47)
PO2 BLDV: 33 MM HG (ref 25–47)
PO2 BLDV: 33 MM HG (ref 25–47)
PO2 BLDV: 34 MM HG (ref 25–47)
POTASSIUM SERPL-SCNC: 3.8 MMOL/L (ref 3.4–5.3)
POTASSIUM SERPL-SCNC: 3.8 MMOL/L (ref 3.4–5.3)
POTASSIUM SERPL-SCNC: 4.1 MMOL/L (ref 3.4–5.3)
POTASSIUM SERPL-SCNC: 4.3 MMOL/L (ref 3.4–5.3)
POTASSIUM SERPL-SCNC: 4.4 MMOL/L (ref 3.4–5.3)
POTASSIUM SERPL-SCNC: 5 MMOL/L (ref 3.4–5.3)
PR INTERVAL - MUSE: 150 MS
PROT SERPL-MCNC: 4.9 G/DL (ref 6.4–8.3)
PROT SERPL-MCNC: 5.1 G/DL (ref 6.4–8.3)
PROT SERPL-MCNC: 5.3 G/DL (ref 6.4–8.3)
PROT SERPL-MCNC: 5.5 G/DL (ref 6.4–8.3)
PROT SERPL-MCNC: 7.1 G/DL (ref 6.4–8.3)
PROTEUS SP DNA BLD POS QL NAA+NON-PROBE: NOT DETECTED
PROTHROMBIN TIME: 14.3 SECONDS (ref 11.8–14.8)
PROTHROMBIN TIME: 20 SECONDS (ref 11.8–14.8)
QRS DURATION - MUSE: 94 MS
QT - MUSE: 328 MS
QTC - MUSE: 467 MS
R AXIS - MUSE: -72 DEGREES
RBC # BLD AUTO: 2.24 10E6/UL (ref 3.8–5.2)
RBC # BLD AUTO: 2.95 10E6/UL (ref 3.8–5.2)
RBC # BLD AUTO: 3.26 10E6/UL (ref 3.8–5.2)
RBC URINE: 13 /HPF
RSV RNA SPEC NAA+PROBE: NEGATIVE
S AUREUS DNA BLD POS QL NAA+NON-PROBE: NOT DETECTED
S AUREUS+CONS DNA BLD POS NAA+NON-PROBE: NOT DETECTED
S EPIDERMIDIS DNA BLD POS QL NAA+NON-PRB: NOT DETECTED
S LUGDUNENSIS DNA BLD POS QL NAA+NON-PRB: NOT DETECTED
S MALTOPHILIA DNA BLD POS QL NAA+NON-PRB: NOT DETECTED
S MARCESCENS DNA BLD POS NAA+NON-PROBE: DETECTED
S PNEUM DNA BLD POS QL NAA+NON-PROBE: NOT DETECTED
S PYO DNA BLD POS QL NAA+NON-PROBE: NOT DETECTED
SALMONELLA DNA BLD POS QL NAA+NON-PROBE: NOT DETECTED
SAO2 % BLDA: 100 % (ref 95–96)
SAO2 % BLDA: 83 % (ref 95–96)
SAO2 % BLDA: 88.7 % (ref 95–96)
SAO2 % BLDA: 95.2 % (ref 95–96)
SAO2 % BLDA: 96 % (ref 95–96)
SAO2 % BLDA: 96.5 % (ref 95–96)
SAO2 % BLDA: 96.6 % (ref 95–96)
SAO2 % BLDA: 97.8 % (ref 95–96)
SAO2 % BLDA: 97.9 % (ref 95–96)
SAO2 % BLDA: 98.6 % (ref 95–96)
SAO2 % BLDA: 98.7 % (ref 95–96)
SAO2 % BLDV: 39.6 % (ref 70–75)
SAO2 % BLDV: 40 % (ref 70–75)
SAO2 % BLDV: 48.4 % (ref 70–75)
SAO2 % BLDV: 48.5 % (ref 70–75)
SAO2 % BLDV: 53.6 % (ref 70–75)
SARS-COV-2 RNA RESP QL NAA+PROBE: NEGATIVE
SODIUM SERPL-SCNC: 126 MMOL/L (ref 135–145)
SODIUM SERPL-SCNC: 132 MMOL/L (ref 135–145)
SODIUM SERPL-SCNC: 132 MMOL/L (ref 135–145)
SODIUM SERPL-SCNC: 133 MMOL/L (ref 135–145)
SODIUM SERPL-SCNC: 134 MMOL/L (ref 135–145)
SODIUM SERPL-SCNC: 136 MMOL/L (ref 135–145)
SP GR UR STRIP: 1.01 (ref 1–1.03)
SPECIMEN EXP DATE BLD: NORMAL
SQUAMOUS EPITHELIAL: <1 /HPF
STREPTOCOCCUS DNA BLD POS NAA+NON-PROBE: NOT DETECTED
SYSTOLIC BLOOD PRESSURE - MUSE: NORMAL MMHG
T AXIS - MUSE: 89 DEGREES
TRIGL SERPL-MCNC: 97 MG/DL
TROPONIN T SERPL HS-MCNC: 1229 NG/L
TROPONIN T SERPL HS-MCNC: 4333 NG/L
UFH PPP CHRO-ACNC: 0.17 IU/ML (ref ?–1.1)
UFH PPP CHRO-ACNC: 1.09 IU/ML (ref ?–1.1)
UFH PPP CHRO-ACNC: <0.1 IU/ML (ref ?–1.1)
UFH PPP CHRO-ACNC: <0.1 IU/ML (ref ?–1.1)
UROBILINOGEN UR STRIP-MCNC: NORMAL MG/DL
VENTRICULAR RATE- MUSE: 122 BPM
WBC # BLD AUTO: 16.7 10E3/UL (ref 4–11)
WBC # BLD AUTO: 17.4 10E3/UL (ref 4–11)
WBC # BLD AUTO: 33.9 10E3/UL (ref 4–11)
WBC URINE: 7 /HPF

## 2025-01-01 PROCEDURE — 02HV33Z INSERTION OF INFUSION DEVICE INTO SUPERIOR VENA CAVA, PERCUTANEOUS APPROACH: ICD-10-PCS | Performed by: INTERNAL MEDICINE

## 2025-01-01 PROCEDURE — 258N000003 HC RX IP 258 OP 636

## 2025-01-01 PROCEDURE — 85730 THROMBOPLASTIN TIME PARTIAL: CPT | Performed by: EMERGENCY MEDICINE

## 2025-01-01 PROCEDURE — 87040 BLOOD CULTURE FOR BACTERIA: CPT | Performed by: EMERGENCY MEDICINE

## 2025-01-01 PROCEDURE — 99292 CRITICAL CARE ADDL 30 MIN: CPT | Performed by: INTERNAL MEDICINE

## 2025-01-01 PROCEDURE — 82330 ASSAY OF CALCIUM: CPT | Performed by: INTERNAL MEDICINE

## 2025-01-01 PROCEDURE — 85014 HEMATOCRIT: CPT | Performed by: INTERNAL MEDICINE

## 2025-01-01 PROCEDURE — 999N000009 HC STATISTIC AIRWAY CARE

## 2025-01-01 PROCEDURE — 85520 HEPARIN ASSAY: CPT | Performed by: INTERNAL MEDICINE

## 2025-01-01 PROCEDURE — 93005 ELECTROCARDIOGRAM TRACING: CPT | Performed by: EMERGENCY MEDICINE

## 2025-01-01 PROCEDURE — 83605 ASSAY OF LACTIC ACID: CPT | Performed by: INTERNAL MEDICINE

## 2025-01-01 PROCEDURE — 33990 INSJ PERQ VAD L HRT ARTERIAL: CPT | Performed by: INTERNAL MEDICINE

## 2025-01-01 PROCEDURE — C9606 PERC D-E COR REVASC W AMI S: HCPCS | Mod: LD | Performed by: INTERNAL MEDICINE

## 2025-01-01 PROCEDURE — 999N000065 XR CHEST PORT 1 VIEW

## 2025-01-01 PROCEDURE — 74018 RADEX ABDOMEN 1 VIEW: CPT

## 2025-01-01 PROCEDURE — 85347 COAGULATION TIME ACTIVATED: CPT

## 2025-01-01 PROCEDURE — 258N000003 HC RX IP 258 OP 636: Performed by: INTERNAL MEDICINE

## 2025-01-01 PROCEDURE — 250N000011 HC RX IP 250 OP 636

## 2025-01-01 PROCEDURE — 999N000259 HC STATISTIC EXTUBATION

## 2025-01-01 PROCEDURE — 999N000157 HC STATISTIC RCP TIME EA 10 MIN

## 2025-01-01 PROCEDURE — 99152 MOD SED SAME PHYS/QHP 5/>YRS: CPT | Performed by: INTERNAL MEDICINE

## 2025-01-01 PROCEDURE — 250N000013 HC RX MED GY IP 250 OP 250 PS 637: Performed by: INTERNAL MEDICINE

## 2025-01-01 PROCEDURE — C1760 CLOSURE DEV, VASC: HCPCS | Performed by: INTERNAL MEDICINE

## 2025-01-01 PROCEDURE — 85004 AUTOMATED DIFF WBC COUNT: CPT | Performed by: EMERGENCY MEDICINE

## 2025-01-01 PROCEDURE — 36556 INSERT NON-TUNNEL CV CATH: CPT | Performed by: INTERNAL MEDICINE

## 2025-01-01 PROCEDURE — 82947 ASSAY GLUCOSE BLOOD QUANT: CPT | Performed by: EMERGENCY MEDICINE

## 2025-01-01 PROCEDURE — 3E033XZ INTRODUCTION OF VASOPRESSOR INTO PERIPHERAL VEIN, PERCUTANEOUS APPROACH: ICD-10-PCS | Performed by: INTERNAL MEDICINE

## 2025-01-01 PROCEDURE — 93460 R&L HRT ART/VENTRICLE ANGIO: CPT | Mod: 26 | Performed by: INTERNAL MEDICINE

## 2025-01-01 PROCEDURE — 93325 DOPPLER ECHO COLOR FLOW MAPG: CPT

## 2025-01-01 PROCEDURE — 92928 PRQ TCAT PLMT NTRAC ST 1 LES: CPT | Mod: LC | Performed by: INTERNAL MEDICINE

## 2025-01-01 PROCEDURE — C9600 PERC DRUG-EL COR STENT SING: HCPCS | Mod: LC

## 2025-01-01 PROCEDURE — 84155 ASSAY OF PROTEIN SERUM: CPT | Performed by: INTERNAL MEDICINE

## 2025-01-01 PROCEDURE — 99285 EMERGENCY DEPT VISIT HI MDM: CPT | Mod: 25 | Performed by: EMERGENCY MEDICINE

## 2025-01-01 PROCEDURE — 250N000011 HC RX IP 250 OP 636: Performed by: INTERNAL MEDICINE

## 2025-01-01 PROCEDURE — 82805 BLOOD GASES W/O2 SATURATION: CPT | Performed by: INTERNAL MEDICINE

## 2025-01-01 PROCEDURE — 94640 AIRWAY INHALATION TREATMENT: CPT

## 2025-01-01 PROCEDURE — C1874 STENT, COATED/COV W/DEL SYS: HCPCS | Performed by: INTERNAL MEDICINE

## 2025-01-01 PROCEDURE — 87205 SMEAR GRAM STAIN: CPT | Performed by: INTERNAL MEDICINE

## 2025-01-01 PROCEDURE — 87340 HEPATITIS B SURFACE AG IA: CPT | Performed by: INTERNAL MEDICINE

## 2025-01-01 PROCEDURE — 93010 ELECTROCARDIOGRAM REPORT: CPT | Performed by: INTERNAL MEDICINE

## 2025-01-01 PROCEDURE — 99291 CRITICAL CARE FIRST HOUR: CPT | Mod: 25

## 2025-01-01 PROCEDURE — 250N000013 HC RX MED GY IP 250 OP 250 PS 637: Performed by: EMERGENCY MEDICINE

## 2025-01-01 PROCEDURE — 0271376 DILATION OF CORONARY ARTERY, TWO ARTERIES, BIFURCATION, WITH FOUR OR MORE DRUG-ELUTING INTRALUMINAL DEVICES, PERCUTANEOUS APPROACH: ICD-10-PCS | Performed by: INTERNAL MEDICINE

## 2025-01-01 PROCEDURE — B211YZZ FLUOROSCOPY OF MULTIPLE CORONARY ARTERIES USING OTHER CONTRAST: ICD-10-PCS | Performed by: INTERNAL MEDICINE

## 2025-01-01 PROCEDURE — 999N000287 HC ICU ADULT ROUNDING, EACH 10 MINS

## 2025-01-01 PROCEDURE — 83690 ASSAY OF LIPASE: CPT | Performed by: EMERGENCY MEDICINE

## 2025-01-01 PROCEDURE — 83010 ASSAY OF HAPTOGLOBIN QUANT: CPT | Performed by: INTERNAL MEDICINE

## 2025-01-01 PROCEDURE — 83605 ASSAY OF LACTIC ACID: CPT | Performed by: EMERGENCY MEDICINE

## 2025-01-01 PROCEDURE — 255N000002 HC RX 255 OP 636: Performed by: INTERNAL MEDICINE

## 2025-01-01 PROCEDURE — 84484 ASSAY OF TROPONIN QUANT: CPT | Performed by: EMERGENCY MEDICINE

## 2025-01-01 PROCEDURE — 36415 COLL VENOUS BLD VENIPUNCTURE: CPT | Performed by: EMERGENCY MEDICINE

## 2025-01-01 PROCEDURE — 87637 SARSCOV2&INF A&B&RSV AMP PRB: CPT | Performed by: EMERGENCY MEDICINE

## 2025-01-01 PROCEDURE — 94640 AIRWAY INHALATION TREATMENT: CPT | Mod: 76

## 2025-01-01 PROCEDURE — 85018 HEMOGLOBIN: CPT | Performed by: INTERNAL MEDICINE

## 2025-01-01 PROCEDURE — 92941 PRQ TRLML REVSC TOT OCCL AMI: CPT | Mod: 22 | Performed by: INTERNAL MEDICINE

## 2025-01-01 PROCEDURE — C1887 CATHETER, GUIDING: HCPCS | Performed by: INTERNAL MEDICINE

## 2025-01-01 PROCEDURE — 85730 THROMBOPLASTIN TIME PARTIAL: CPT | Performed by: INTERNAL MEDICINE

## 2025-01-01 PROCEDURE — C9600 PERC DRUG-EL COR STENT SING: HCPCS | Performed by: INTERNAL MEDICINE

## 2025-01-01 PROCEDURE — 93308 TTE F-UP OR LMTD: CPT | Mod: 26 | Performed by: INTERNAL MEDICINE

## 2025-01-01 PROCEDURE — 999N000254 HC STATISTIC VENTILATOR TRANSFER

## 2025-01-01 PROCEDURE — 93321 DOPPLER ECHO F-UP/LMTD STD: CPT | Mod: 26 | Performed by: INTERNAL MEDICINE

## 2025-01-01 PROCEDURE — 93005 ELECTROCARDIOGRAM TRACING: CPT

## 2025-01-01 PROCEDURE — C1751 CATH, INF, PER/CENT/MIDLINE: HCPCS | Performed by: INTERNAL MEDICINE

## 2025-01-01 PROCEDURE — C1725 CATH, TRANSLUMIN NON-LASER: HCPCS | Performed by: INTERNAL MEDICINE

## 2025-01-01 PROCEDURE — C1894 INTRO/SHEATH, NON-LASER: HCPCS | Performed by: INTERNAL MEDICINE

## 2025-01-01 PROCEDURE — 82310 ASSAY OF CALCIUM: CPT | Performed by: INTERNAL MEDICINE

## 2025-01-01 PROCEDURE — 83735 ASSAY OF MAGNESIUM: CPT | Performed by: INTERNAL MEDICINE

## 2025-01-01 PROCEDURE — 90947 DIALYSIS REPEATED EVAL: CPT

## 2025-01-01 PROCEDURE — 5A0221D ASSISTANCE WITH CARDIAC OUTPUT USING IMPELLER PUMP, CONTINUOUS: ICD-10-PCS | Performed by: INTERNAL MEDICINE

## 2025-01-01 PROCEDURE — 83880 ASSAY OF NATRIURETIC PEPTIDE: CPT | Performed by: EMERGENCY MEDICINE

## 2025-01-01 PROCEDURE — 87154 CUL TYP ID BLD PTHGN 6+ TRGT: CPT | Performed by: EMERGENCY MEDICINE

## 2025-01-01 PROCEDURE — C9601 PERC DRUG-EL COR STENT BRAN: HCPCS | Mod: LD | Performed by: INTERNAL MEDICINE

## 2025-01-01 PROCEDURE — 94003 VENT MGMT INPAT SUBQ DAY: CPT

## 2025-01-01 PROCEDURE — 82947 ASSAY GLUCOSE BLOOD QUANT: CPT | Performed by: INTERNAL MEDICINE

## 2025-01-01 PROCEDURE — 85610 PROTHROMBIN TIME: CPT | Performed by: INTERNAL MEDICINE

## 2025-01-01 PROCEDURE — 02HQ32Z INSERTION OF MONITORING DEVICE INTO RIGHT PULMONARY ARTERY, PERCUTANEOUS APPROACH: ICD-10-PCS | Performed by: INTERNAL MEDICINE

## 2025-01-01 PROCEDURE — 250N000009 HC RX 250: Performed by: INTERNAL MEDICINE

## 2025-01-01 PROCEDURE — 94002 VENT MGMT INPAT INIT DAY: CPT

## 2025-01-01 PROCEDURE — 250N000012 HC RX MED GY IP 250 OP 636 PS 637: Performed by: INTERNAL MEDICINE

## 2025-01-01 PROCEDURE — 93460 R&L HRT ART/VENTRICLE ANGIO: CPT | Performed by: INTERNAL MEDICINE

## 2025-01-01 PROCEDURE — 370N000017 HC ANESTHESIA TECHNICAL FEE, PER MIN: Performed by: INTERNAL MEDICINE

## 2025-01-01 PROCEDURE — 5A1D90Z PERFORMANCE OF URINARY FILTRATION, CONTINUOUS, GREATER THAN 18 HOURS PER DAY: ICD-10-PCS | Performed by: INTERNAL MEDICINE

## 2025-01-01 PROCEDURE — 93325 DOPPLER ECHO COLOR FLOW MAPG: CPT | Mod: 26 | Performed by: INTERNAL MEDICINE

## 2025-01-01 PROCEDURE — 80061 LIPID PANEL: CPT | Performed by: INTERNAL MEDICINE

## 2025-01-01 PROCEDURE — 94660 CPAP INITIATION&MGMT: CPT

## 2025-01-01 PROCEDURE — 200N000001 HC R&B ICU

## 2025-01-01 PROCEDURE — 81001 URINALYSIS AUTO W/SCOPE: CPT | Performed by: INTERNAL MEDICINE

## 2025-01-01 PROCEDURE — C1769 GUIDE WIRE: HCPCS | Performed by: INTERNAL MEDICINE

## 2025-01-01 PROCEDURE — 99239 HOSP IP/OBS DSCHRG MGMT >30: CPT | Performed by: NURSE PRACTITIONER

## 2025-01-01 PROCEDURE — 272N000001 HC OR GENERAL SUPPLY STERILE: Performed by: INTERNAL MEDICINE

## 2025-01-01 PROCEDURE — 85610 PROTHROMBIN TIME: CPT | Performed by: EMERGENCY MEDICINE

## 2025-01-01 PROCEDURE — 85384 FIBRINOGEN ACTIVITY: CPT | Performed by: INTERNAL MEDICINE

## 2025-01-01 PROCEDURE — 99207 ARTERIAL LINE PLACEMENT: CPT | Performed by: INTERNAL MEDICINE

## 2025-01-01 PROCEDURE — 250N000011 HC RX IP 250 OP 636: Performed by: EMERGENCY MEDICINE

## 2025-01-01 PROCEDURE — 84484 ASSAY OF TROPONIN QUANT: CPT | Performed by: INTERNAL MEDICINE

## 2025-01-01 PROCEDURE — 93005 ELECTROCARDIOGRAM TRACING: CPT | Performed by: INTERNAL MEDICINE

## 2025-01-01 PROCEDURE — 82248 BILIRUBIN DIRECT: CPT | Performed by: INTERNAL MEDICINE

## 2025-01-01 PROCEDURE — 99222 1ST HOSP IP/OBS MODERATE 55: CPT | Performed by: INTERNAL MEDICINE

## 2025-01-01 PROCEDURE — 4A1239Z MONITORING OF CARDIAC OUTPUT, PERCUTANEOUS APPROACH: ICD-10-PCS | Performed by: INTERNAL MEDICINE

## 2025-01-01 PROCEDURE — 84100 ASSAY OF PHOSPHORUS: CPT | Performed by: INTERNAL MEDICINE

## 2025-01-01 PROCEDURE — 02HA3RZ INSERTION OF SHORT-TERM EXTERNAL HEART ASSIST SYSTEM INTO HEART, PERCUTANEOUS APPROACH: ICD-10-PCS | Performed by: INTERNAL MEDICINE

## 2025-01-01 PROCEDURE — 92929 PR PRQ TRLUML CORONARY BM STENT W/ANGIO ADDL ART/BRNCH: CPT | Mod: LD | Performed by: INTERNAL MEDICINE

## 2025-01-01 PROCEDURE — 83036 HEMOGLOBIN GLYCOSYLATED A1C: CPT | Performed by: INTERNAL MEDICINE

## 2025-01-01 PROCEDURE — 82805 BLOOD GASES W/O2 SATURATION: CPT

## 2025-01-01 PROCEDURE — 96374 THER/PROPH/DIAG INJ IV PUSH: CPT

## 2025-01-01 PROCEDURE — 4A133B3 MONITORING OF ARTERIAL PRESSURE, PULMONARY, PERCUTANEOUS APPROACH: ICD-10-PCS | Performed by: INTERNAL MEDICINE

## 2025-01-01 PROCEDURE — 5A1945Z RESPIRATORY VENTILATION, 24-96 CONSECUTIVE HOURS: ICD-10-PCS | Performed by: INTERNAL MEDICINE

## 2025-01-01 PROCEDURE — 99153 MOD SED SAME PHYS/QHP EA: CPT | Performed by: INTERNAL MEDICINE

## 2025-01-01 PROCEDURE — 86900 BLOOD TYPING SEROLOGIC ABO: CPT | Performed by: INTERNAL MEDICINE

## 2025-01-01 PROCEDURE — 3E073PZ INTRODUCTION OF PLATELET INHIBITOR INTO CORONARY ARTERY, PERCUTANEOUS APPROACH: ICD-10-PCS | Performed by: INTERNAL MEDICINE

## 2025-01-01 PROCEDURE — 71045 X-RAY EXAM CHEST 1 VIEW: CPT

## 2025-01-01 PROCEDURE — 99291 CRITICAL CARE FIRST HOUR: CPT | Performed by: INTERNAL MEDICINE

## 2025-01-01 PROCEDURE — 4A023N8 MEASUREMENT OF CARDIAC SAMPLING AND PRESSURE, BILATERAL, PERCUTANEOUS APPROACH: ICD-10-PCS | Performed by: INTERNAL MEDICINE

## 2025-01-01 DEVICE — STENT CORONARY DES SYNERGY XD MR US 4.00X28MM H7493941828400: Type: IMPLANTABLE DEVICE | Site: CORONARY | Status: FUNCTIONAL

## 2025-01-01 DEVICE — STENT CORONARY DES SYNERGY XD MR US 2.50X8MM H7493941808250: Type: IMPLANTABLE DEVICE | Site: CORONARY | Status: FUNCTIONAL

## 2025-01-01 DEVICE — STENT CORONARY DES SYNERGY XD MR US 2.75X32MM H7493941832270: Type: IMPLANTABLE DEVICE | Site: CORONARY | Status: FUNCTIONAL

## 2025-01-01 DEVICE — STENT CORONARY SYNERGY XD MR US 3.0X48MM H7493941848300: Type: IMPLANTABLE DEVICE | Site: CORONARY | Status: FUNCTIONAL

## 2025-01-01 RX ORDER — ACETAMINOPHEN 325 MG/1
650 TABLET ORAL EVERY 4 HOURS PRN
Status: DISCONTINUED | OUTPATIENT
Start: 2025-01-01 | End: 2025-01-01

## 2025-01-01 RX ORDER — LIDOCAINE 40 MG/G
CREAM TOPICAL
Status: DISCONTINUED | OUTPATIENT
Start: 2025-01-01 | End: 2025-01-01 | Stop reason: HOSPADM

## 2025-01-01 RX ORDER — EPINEPHRINE 0.1 MG/ML
INJECTION INTRAVENOUS
Status: DISCONTINUED | OUTPATIENT
Start: 2025-01-01 | End: 2025-01-01 | Stop reason: HOSPADM

## 2025-01-01 RX ORDER — DOBUTAMINE HCL IN DEXTROSE 5 % 500MG/250
5 INTRAVENOUS SOLUTION INTRAVENOUS CONTINUOUS
Status: DISCONTINUED | OUTPATIENT
Start: 2025-01-01 | End: 2025-01-01 | Stop reason: HOSPADM

## 2025-01-01 RX ORDER — CLOPIDOGREL BISULFATE 75 MG/1
75 TABLET ORAL DAILY
Status: DISCONTINUED | OUTPATIENT
Start: 2025-01-01 | End: 2025-01-01

## 2025-01-01 RX ORDER — CHLORHEXIDINE GLUCONATE ORAL RINSE 1.2 MG/ML
15 SOLUTION DENTAL EVERY 12 HOURS
Status: DISCONTINUED | OUTPATIENT
Start: 2025-01-01 | End: 2025-01-01

## 2025-01-01 RX ORDER — NALOXONE HYDROCHLORIDE 0.4 MG/ML
0.4 INJECTION, SOLUTION INTRAMUSCULAR; INTRAVENOUS; SUBCUTANEOUS
Status: DISCONTINUED | OUTPATIENT
Start: 2025-01-01 | End: 2025-01-01 | Stop reason: HOSPADM

## 2025-01-01 RX ORDER — CLOPIDOGREL BISULFATE 75 MG/1
75 TABLET ORAL DAILY
Qty: 90 TABLET | Refills: 3 | Status: SHIPPED | OUTPATIENT
Start: 2025-01-01

## 2025-01-01 RX ORDER — SODIUM CHLORIDE 9 MG/ML
INJECTION, SOLUTION INTRAVENOUS CONTINUOUS
Status: DISCONTINUED | OUTPATIENT
Start: 2025-01-01 | End: 2025-01-01

## 2025-01-01 RX ORDER — FUROSEMIDE 10 MG/ML
INJECTION INTRAMUSCULAR; INTRAVENOUS
Status: DISCONTINUED | OUTPATIENT
Start: 2025-01-01 | End: 2025-01-01 | Stop reason: HOSPADM

## 2025-01-01 RX ORDER — NOREPINEPHRINE BITARTRATE 0.02 MG/ML
.01-.6 INJECTION, SOLUTION INTRAVENOUS CONTINUOUS
Status: DISCONTINUED | OUTPATIENT
Start: 2025-01-01 | End: 2025-01-01

## 2025-01-01 RX ORDER — ASPIRIN 81 MG/1
81 TABLET ORAL DAILY
Status: DISCONTINUED | OUTPATIENT
Start: 2025-01-01 | End: 2025-01-01

## 2025-01-01 RX ORDER — HEPARIN SODIUM 200 [USP'U]/100ML
INJECTION, SOLUTION INTRAVENOUS
Status: DISCONTINUED | OUTPATIENT
Start: 2025-01-01 | End: 2025-01-01 | Stop reason: HOSPADM

## 2025-01-01 RX ORDER — FENTANYL CITRATE 50 UG/ML
25 INJECTION, SOLUTION INTRAMUSCULAR; INTRAVENOUS
Refills: 0 | Status: DISCONTINUED | OUTPATIENT
Start: 2025-01-01 | End: 2025-01-01

## 2025-01-01 RX ORDER — IPRATROPIUM BROMIDE AND ALBUTEROL SULFATE 2.5; .5 MG/3ML; MG/3ML
3 SOLUTION RESPIRATORY (INHALATION) ONCE
Status: DISCONTINUED | OUTPATIENT
Start: 2025-01-01 | End: 2025-01-01

## 2025-01-01 RX ORDER — HEPARIN SODIUM 10000 [USP'U]/100ML
0-5000 INJECTION, SOLUTION INTRAVENOUS CONTINUOUS
Status: DISCONTINUED | OUTPATIENT
Start: 2025-01-01 | End: 2025-01-01 | Stop reason: HOSPADM

## 2025-01-01 RX ORDER — NITROGLYCERIN 0.4 MG/1
0.4 TABLET SUBLINGUAL EVERY 5 MIN PRN
Status: DISCONTINUED | OUTPATIENT
Start: 2025-01-01 | End: 2025-01-01 | Stop reason: HOSPADM

## 2025-01-01 RX ORDER — DEXTROSE MONOHYDRATE 25 G/50ML
25-50 INJECTION, SOLUTION INTRAVENOUS
Status: DISCONTINUED | OUTPATIENT
Start: 2025-01-01 | End: 2025-01-01

## 2025-01-01 RX ORDER — ASPIRIN 81 MG/1
81 TABLET, CHEWABLE ORAL DAILY
Status: DISCONTINUED | OUTPATIENT
Start: 2025-01-01 | End: 2025-01-01

## 2025-01-01 RX ORDER — LEVOTHYROXINE SODIUM 88 UG/1
88 TABLET ORAL DAILY
Status: DISCONTINUED | OUTPATIENT
Start: 2025-01-01 | End: 2025-01-01

## 2025-01-01 RX ORDER — DOPAMINE HYDROCHLORIDE 160 MG/100ML
2-20 INJECTION, SOLUTION INTRAVENOUS CONTINUOUS
Status: DISCONTINUED | OUTPATIENT
Start: 2025-01-01 | End: 2025-01-01

## 2025-01-01 RX ORDER — IODIXANOL 320 MG/ML
INJECTION, SOLUTION INTRAVASCULAR
Status: DISCONTINUED | OUTPATIENT
Start: 2025-01-01 | End: 2025-01-01 | Stop reason: HOSPADM

## 2025-01-01 RX ORDER — GLYCOPYRROLATE 0.2 MG/ML
0.1 INJECTION, SOLUTION INTRAMUSCULAR; INTRAVENOUS EVERY 4 HOURS PRN
Status: DISCONTINUED | OUTPATIENT
Start: 2025-01-01 | End: 2025-01-01 | Stop reason: HOSPADM

## 2025-01-01 RX ORDER — DEXMEDETOMIDINE HYDROCHLORIDE 4 UG/ML
INJECTION, SOLUTION INTRAVENOUS CONTINUOUS PRN
Status: DISCONTINUED | OUTPATIENT
Start: 2025-01-01 | End: 2025-01-01 | Stop reason: HOSPADM

## 2025-01-01 RX ORDER — ONDANSETRON 4 MG/1
4 TABLET, ORALLY DISINTEGRATING ORAL EVERY 6 HOURS PRN
Status: DISCONTINUED | OUTPATIENT
Start: 2025-01-01 | End: 2025-01-01 | Stop reason: HOSPADM

## 2025-01-01 RX ORDER — ASPIRIN 81 MG/1
81 TABLET, CHEWABLE ORAL ONCE
Status: DISCONTINUED | OUTPATIENT
Start: 2025-01-01 | End: 2025-01-01

## 2025-01-01 RX ORDER — CALCIUM GLUCONATE 20 MG/ML
2 INJECTION, SOLUTION INTRAVENOUS EVERY 8 HOURS PRN
Status: DISCONTINUED | OUTPATIENT
Start: 2025-01-01 | End: 2025-01-01 | Stop reason: HOSPADM

## 2025-01-01 RX ORDER — EPINEPHRINE 0.1 MG/ML
INJECTION INTRAVENOUS
Status: COMPLETED
Start: 2025-01-01 | End: 2025-01-01

## 2025-01-01 RX ORDER — EPTIFIBATIDE 2 MG/ML
INJECTION, SOLUTION INTRAVENOUS
Status: DISCONTINUED | OUTPATIENT
Start: 2025-01-01 | End: 2025-01-01 | Stop reason: HOSPADM

## 2025-01-01 RX ORDER — PIPERACILLIN SODIUM, TAZOBACTAM SODIUM 4; .5 G/20ML; G/20ML
4.5 INJECTION, POWDER, LYOPHILIZED, FOR SOLUTION INTRAVENOUS EVERY 6 HOURS
Status: DISCONTINUED | OUTPATIENT
Start: 2025-01-01 | End: 2025-01-01

## 2025-01-01 RX ORDER — ACETAMINOPHEN 650 MG/1
650 SUPPOSITORY RECTAL EVERY 4 HOURS PRN
Status: DISCONTINUED | OUTPATIENT
Start: 2025-01-01 | End: 2025-01-01 | Stop reason: HOSPADM

## 2025-01-01 RX ORDER — CLOPIDOGREL 300 MG/1
600 TABLET, FILM COATED ORAL ONCE
Status: COMPLETED | OUTPATIENT
Start: 2025-01-01 | End: 2025-01-01

## 2025-01-01 RX ORDER — ACETAMINOPHEN 325 MG/1
650 TABLET ORAL EVERY 4 HOURS PRN
Status: DISCONTINUED | OUTPATIENT
Start: 2025-01-01 | End: 2025-01-01 | Stop reason: HOSPADM

## 2025-01-01 RX ORDER — DOPAMINE HYDROCHLORIDE 160 MG/100ML
INJECTION, SOLUTION INTRAVENOUS CONTINUOUS PRN
Status: COMPLETED | OUTPATIENT
Start: 2025-01-01 | End: 2025-01-01

## 2025-01-01 RX ORDER — NICOTINE POLACRILEX 4 MG
15-30 LOZENGE BUCCAL
Status: DISCONTINUED | OUTPATIENT
Start: 2025-01-01 | End: 2025-01-01

## 2025-01-01 RX ORDER — PROPOFOL 10 MG/ML
INJECTION, EMULSION INTRAVENOUS
Status: COMPLETED | OUTPATIENT
Start: 2025-01-01 | End: 2025-01-01

## 2025-01-01 RX ORDER — NALOXONE HYDROCHLORIDE 0.4 MG/ML
0.2 INJECTION, SOLUTION INTRAMUSCULAR; INTRAVENOUS; SUBCUTANEOUS
Status: DISCONTINUED | OUTPATIENT
Start: 2025-01-01 | End: 2025-01-01 | Stop reason: HOSPADM

## 2025-01-01 RX ORDER — ONDANSETRON 2 MG/ML
INJECTION INTRAMUSCULAR; INTRAVENOUS
Status: DISCONTINUED | OUTPATIENT
Start: 2025-01-01 | End: 2025-01-01 | Stop reason: HOSPADM

## 2025-01-01 RX ORDER — CALCIUM CHLORIDE, MAGNESIUM CHLORIDE, SODIUM CHLORIDE, SODIUM BICARBONATE, POTASSIUM CHLORIDE AND SODIUM PHOSPHATE DIBASIC DIHYDRATE 3.68; 3.05; 6.34; 3.09; .314; .187 G/L; G/L; G/L; G/L; G/L; G/L
200 INJECTION INTRAVENOUS CONTINUOUS
Status: DISCONTINUED | OUTPATIENT
Start: 2025-01-01 | End: 2025-01-01

## 2025-01-01 RX ORDER — GLYCOPYRROLATE 0.2 MG/ML
0.2 INJECTION, SOLUTION INTRAMUSCULAR; INTRAVENOUS ONCE
Status: COMPLETED | OUTPATIENT
Start: 2025-01-01 | End: 2025-01-01

## 2025-01-01 RX ORDER — METOPROLOL TARTRATE 1 MG/ML
5 INJECTION, SOLUTION INTRAVENOUS
Status: DISCONTINUED | OUTPATIENT
Start: 2025-01-01 | End: 2025-01-01

## 2025-01-01 RX ORDER — OXYCODONE HYDROCHLORIDE 5 MG/1
10 TABLET ORAL EVERY 4 HOURS PRN
Refills: 0 | Status: DISCONTINUED | OUTPATIENT
Start: 2025-01-01 | End: 2025-01-01 | Stop reason: HOSPADM

## 2025-01-01 RX ORDER — FLUMAZENIL 0.1 MG/ML
0.2 INJECTION, SOLUTION INTRAVENOUS
Status: DISCONTINUED | OUTPATIENT
Start: 2025-01-01 | End: 2025-01-01 | Stop reason: HOSPADM

## 2025-01-01 RX ORDER — BISACODYL 10 MG
10 SUPPOSITORY, RECTAL RECTAL DAILY PRN
Status: DISCONTINUED | OUTPATIENT
Start: 2025-01-01 | End: 2025-01-01 | Stop reason: HOSPADM

## 2025-01-01 RX ORDER — PIPERACILLIN SODIUM, TAZOBACTAM SODIUM 2; .25 G/10ML; G/10ML
2.25 INJECTION, POWDER, LYOPHILIZED, FOR SOLUTION INTRAVENOUS EVERY 6 HOURS
Status: DISCONTINUED | OUTPATIENT
Start: 2025-01-01 | End: 2025-01-01

## 2025-01-01 RX ORDER — MIDAZOLAM HCL IN 0.9 % NACL/PF 1 MG/ML
1-8 PLASTIC BAG, INJECTION (ML) INTRAVENOUS CONTINUOUS
Status: DISCONTINUED | OUTPATIENT
Start: 2025-01-01 | End: 2025-01-01 | Stop reason: HOSPADM

## 2025-01-01 RX ORDER — CALCIUM CHLORIDE, MAGNESIUM CHLORIDE, SODIUM CHLORIDE, SODIUM BICARBONATE, POTASSIUM CHLORIDE AND SODIUM PHOSPHATE DIBASIC DIHYDRATE 3.68; 3.05; 6.34; 3.09; .314; .187 G/L; G/L; G/L; G/L; G/L; G/L
12.5 INJECTION INTRAVENOUS CONTINUOUS
Status: DISCONTINUED | OUTPATIENT
Start: 2025-01-01 | End: 2025-01-01

## 2025-01-01 RX ORDER — INDOMETHACIN 25 MG/1
CAPSULE ORAL
Status: DISCONTINUED | OUTPATIENT
Start: 2025-01-01 | End: 2025-01-01 | Stop reason: HOSPADM

## 2025-01-01 RX ORDER — ONDANSETRON 2 MG/ML
4 INJECTION INTRAMUSCULAR; INTRAVENOUS EVERY 6 HOURS PRN
Status: DISCONTINUED | OUTPATIENT
Start: 2025-01-01 | End: 2025-01-01 | Stop reason: HOSPADM

## 2025-01-01 RX ORDER — ACETAMINOPHEN 325 MG/10.15ML
650 LIQUID ORAL EVERY 4 HOURS PRN
Status: DISCONTINUED | OUTPATIENT
Start: 2025-01-01 | End: 2025-01-01 | Stop reason: HOSPADM

## 2025-01-01 RX ORDER — ATROPINE SULFATE 0.1 MG/ML
0.5 INJECTION INTRAVENOUS
Status: ACTIVE | OUTPATIENT
Start: 2025-01-01 | End: 2025-01-01

## 2025-01-01 RX ORDER — ATROPINE SULFATE 10 MG/ML
1 SOLUTION/ DROPS OPHTHALMIC
Status: DISCONTINUED | OUTPATIENT
Start: 2025-01-01 | End: 2025-01-01 | Stop reason: HOSPADM

## 2025-01-01 RX ORDER — POTASSIUM CHLORIDE 29.8 MG/ML
20 INJECTION INTRAVENOUS EVERY 8 HOURS PRN
Status: DISCONTINUED | OUTPATIENT
Start: 2025-01-01 | End: 2025-01-01

## 2025-01-01 RX ORDER — TICAGRELOR 90 MG/1
180 TABLET, FILM COATED ORAL ONCE
Status: COMPLETED | OUTPATIENT
Start: 2025-01-01 | End: 2025-01-01

## 2025-01-01 RX ORDER — NITROGLYCERIN 0.4 MG/1
0.4 TABLET SUBLINGUAL ONCE
Status: COMPLETED | OUTPATIENT
Start: 2025-01-01 | End: 2025-01-01

## 2025-01-01 RX ORDER — DOBUTAMINE HYDROCHLORIDE 200 MG/100ML
2.5 INJECTION INTRAVENOUS CONTINUOUS
Status: DISCONTINUED | OUTPATIENT
Start: 2025-01-01 | End: 2025-01-01

## 2025-01-01 RX ORDER — NOREPINEPHRINE BITARTRATE 0.06 MG/ML
INJECTION, SOLUTION INTRAVENOUS CONTINUOUS PRN
Status: COMPLETED | OUTPATIENT
Start: 2025-01-01 | End: 2025-01-01

## 2025-01-01 RX ORDER — OXYCODONE HYDROCHLORIDE 5 MG/1
5 TABLET ORAL EVERY 4 HOURS PRN
Refills: 0 | Status: DISCONTINUED | OUTPATIENT
Start: 2025-01-01 | End: 2025-01-01 | Stop reason: HOSPADM

## 2025-01-01 RX ORDER — ALLOPURINOL 100 MG/1
100 TABLET ORAL DAILY
COMMUNITY
Start: 2025-01-01

## 2025-01-01 RX ORDER — HYDRALAZINE HYDROCHLORIDE 20 MG/ML
10 INJECTION INTRAMUSCULAR; INTRAVENOUS EVERY 4 HOURS PRN
Status: DISCONTINUED | OUTPATIENT
Start: 2025-01-01 | End: 2025-01-01 | Stop reason: HOSPADM

## 2025-01-01 RX ORDER — DEXMEDETOMIDINE HYDROCHLORIDE 4 UG/ML
.1-1.2 INJECTION, SOLUTION INTRAVENOUS CONTINUOUS
Status: DISCONTINUED | OUTPATIENT
Start: 2025-01-01 | End: 2025-01-01 | Stop reason: HOSPADM

## 2025-01-01 RX ORDER — HEPARIN SODIUM 1000 [USP'U]/ML
INJECTION, SOLUTION INTRAVENOUS; SUBCUTANEOUS
Status: DISCONTINUED | OUTPATIENT
Start: 2025-01-01 | End: 2025-01-01 | Stop reason: HOSPADM

## 2025-01-01 RX ORDER — FUROSEMIDE 10 MG/ML
80 INJECTION INTRAMUSCULAR; INTRAVENOUS EVERY 8 HOURS
Status: DISCONTINUED | OUTPATIENT
Start: 2025-01-01 | End: 2025-01-01

## 2025-01-01 RX ORDER — ASPIRIN 81 MG/1
81 TABLET, CHEWABLE ORAL DAILY
Qty: 30 TABLET | Refills: 3 | Status: SHIPPED | OUTPATIENT
Start: 2025-01-01

## 2025-01-01 RX ORDER — DOPAMINE HYDROCHLORIDE 320 MG/100ML
2-20 INJECTION, SOLUTION INTRAVENOUS CONTINUOUS
Status: DISCONTINUED | OUTPATIENT
Start: 2025-01-01 | End: 2025-01-01

## 2025-01-01 RX ORDER — CARBOXYMETHYLCELLULOSE SODIUM 5 MG/ML
1 SOLUTION/ DROPS OPHTHALMIC EVERY 8 HOURS PRN
Status: DISCONTINUED | OUTPATIENT
Start: 2025-01-01 | End: 2025-01-01 | Stop reason: HOSPADM

## 2025-01-01 RX ORDER — MAGNESIUM SULFATE HEPTAHYDRATE 40 MG/ML
2 INJECTION, SOLUTION INTRAVENOUS EVERY 8 HOURS PRN
Status: DISCONTINUED | OUTPATIENT
Start: 2025-01-01 | End: 2025-01-01

## 2025-01-01 RX ADMIN — DEXMEDETOMIDINE HYDROCHLORIDE 0.8 MCG/KG/HR: 400 INJECTION INTRAVENOUS at 12:31

## 2025-01-01 RX ADMIN — EPINEPHRINE 0.3 MCG/KG/MIN: 1 INJECTION INTRAMUSCULAR; INTRAVENOUS; SUBCUTANEOUS at 13:22

## 2025-01-01 RX ADMIN — FUROSEMIDE 80 MG: 10 INJECTION, SOLUTION INTRAMUSCULAR; INTRAVENOUS at 13:09

## 2025-01-01 RX ADMIN — MIDAZOLAM HYDROCHLORIDE 1 MG/HR: 1 INJECTION, SOLUTION INTRAVENOUS at 12:28

## 2025-01-01 RX ADMIN — LEVOTHYROXINE SODIUM 88 MCG: 0.09 TABLET ORAL at 14:44

## 2025-01-01 RX ADMIN — MAGNESIUM SULFATE HEPTAHYDRATE 2 G: 2 INJECTION, SOLUTION INTRAVENOUS at 19:43

## 2025-01-01 RX ADMIN — HEPARIN SODIUM 5000 UNITS: 1000 INJECTION, SOLUTION INTRAVENOUS; SUBCUTANEOUS at 06:27

## 2025-01-01 RX ADMIN — TICAGRELOR 180 MG: 90 TABLET ORAL at 06:27

## 2025-01-01 RX ADMIN — Medication 50 MCG: at 01:51

## 2025-01-01 RX ADMIN — PIPERACILLIN AND TAZOBACTAM 2.25 G: 2; .25 INJECTION, POWDER, FOR SOLUTION INTRAVENOUS at 16:31

## 2025-01-01 RX ADMIN — MIDAZOLAM HYDROCHLORIDE 2 MG: 1 INJECTION, SOLUTION INTRAMUSCULAR; INTRAVENOUS at 11:00

## 2025-01-01 RX ADMIN — DOPAMINE HYDROCHLORIDE 15 MCG/KG/MIN: 160 INJECTION, SOLUTION INTRAVENOUS at 11:49

## 2025-01-01 RX ADMIN — SODIUM CHLORIDE 500 ML: 0.9 INJECTION, SOLUTION INTRAVENOUS at 15:53

## 2025-01-01 RX ADMIN — PHENYLEPHRINE HYDROCHLORIDE 300 MCG: 10 INJECTION INTRAVENOUS at 09:06

## 2025-01-01 RX ADMIN — PIPERACILLIN SODIUM AND TAZOBACTAM SODIUM 4.5 G: 4; .5 INJECTION, POWDER, LYOPHILIZED, FOR SOLUTION INTRAVENOUS at 20:00

## 2025-01-01 RX ADMIN — INSULIN ASPART 3 UNITS: 100 INJECTION, SOLUTION INTRAVENOUS; SUBCUTANEOUS at 14:53

## 2025-01-01 RX ADMIN — CALCIUM CHLORIDE 2 G: 100 INJECTION, SOLUTION INTRAVENOUS at 15:16

## 2025-01-01 RX ADMIN — DOPAMINE HYDROCHLORIDE 14 MCG/KG/MIN: 160 INJECTION, SOLUTION INTRAVENOUS at 13:07

## 2025-01-01 RX ADMIN — EPINEPHRINE 0.28 MCG/KG/MIN: 1 INJECTION INTRAMUSCULAR; INTRAVENOUS; SUBCUTANEOUS at 01:33

## 2025-01-01 RX ADMIN — DEXMEDETOMIDINE HYDROCHLORIDE 1 MCG/KG/HR: 400 INJECTION INTRAVENOUS at 02:51

## 2025-01-01 RX ADMIN — CALCIUM CHLORIDE, MAGNESIUM CHLORIDE, SODIUM CHLORIDE, SODIUM BICARBONATE, POTASSIUM CHLORIDE AND SODIUM PHOSPHATE DIBASIC DIHYDRATE 12.5 ML/KG/HR: 3.68; 3.05; 6.34; 3.09; .314; .187 INJECTION INTRAVENOUS at 18:34

## 2025-01-01 RX ADMIN — VASOPRESSIN 2.4 UNITS/HR: 20 INJECTION INTRAVENOUS at 23:34

## 2025-01-01 RX ADMIN — CALCIUM GLUCONATE 2 G: 20 INJECTION, SOLUTION INTRAVENOUS at 06:37

## 2025-01-01 RX ADMIN — DOPAMINE HYDROCHLORIDE 11 MCG/KG/MIN: 40 INJECTION, SOLUTION, CONCENTRATE INTRAVENOUS at 13:37

## 2025-01-01 RX ADMIN — CALCIUM CHLORIDE, MAGNESIUM CHLORIDE, SODIUM CHLORIDE, SODIUM BICARBONATE, POTASSIUM CHLORIDE AND SODIUM PHOSPHATE DIBASIC DIHYDRATE 12.5 ML/KG/HR: 3.68; 3.05; 6.34; 3.09; .314; .187 INJECTION INTRAVENOUS at 00:00

## 2025-01-01 RX ADMIN — SODIUM BICARBONATE 13 ML/HR: 84 INJECTION, SOLUTION INTRAVENOUS at 11:45

## 2025-01-01 RX ADMIN — IPRATROPIUM BROMIDE 0.5 MG: 0.5 SOLUTION RESPIRATORY (INHALATION) at 12:05

## 2025-01-01 RX ADMIN — IPRATROPIUM BROMIDE 0.5 MG: 0.5 SOLUTION RESPIRATORY (INHALATION) at 04:42

## 2025-01-01 RX ADMIN — DEXMEDETOMIDINE HYDROCHLORIDE 1 MCG/KG/HR: 400 INJECTION INTRAVENOUS at 08:57

## 2025-01-01 RX ADMIN — CALCIUM CHLORIDE, MAGNESIUM CHLORIDE, SODIUM CHLORIDE, SODIUM BICARBONATE, POTASSIUM CHLORIDE AND SODIUM PHOSPHATE DIBASIC DIHYDRATE 200 ML/HR: 3.68; 3.05; 6.34; 3.09; .314; .187 INJECTION INTRAVENOUS at 18:34

## 2025-01-01 RX ADMIN — CLOPIDOGREL BISULFATE 600 MG: 300 TABLET, FILM COATED ORAL at 14:43

## 2025-01-01 RX ADMIN — NITROGLYCERIN 0.4 MG: 0.4 TABLET, ORALLY DISINTEGRATING SUBLINGUAL at 06:57

## 2025-01-01 RX ADMIN — PROPOFOL 50 MG: 10 INJECTION, EMULSION INTRAVENOUS at 08:57

## 2025-01-01 RX ADMIN — IPRATROPIUM BROMIDE 0.5 MG: 0.5 SOLUTION RESPIRATORY (INHALATION) at 19:25

## 2025-01-01 RX ADMIN — ACETAMINOPHEN 650 MG: 325 SOLUTION ORAL at 15:04

## 2025-01-01 RX ADMIN — DEXMEDETOMIDINE HYDROCHLORIDE 1 MCG/KG/HR: 400 INJECTION INTRAVENOUS at 16:59

## 2025-01-01 RX ADMIN — DEXMEDETOMIDINE HYDROCHLORIDE 1 MCG/KG/HR: 400 INJECTION INTRAVENOUS at 21:12

## 2025-01-01 RX ADMIN — NOREPINEPHRINE BITARTRATE 0.15 MCG/KG/MIN: 1 INJECTION, SOLUTION, CONCENTRATE INTRAVENOUS at 13:37

## 2025-01-01 RX ADMIN — IPRATROPIUM BROMIDE 0.5 MG: 0.5 SOLUTION RESPIRATORY (INHALATION) at 00:52

## 2025-01-01 RX ADMIN — CHLORHEXIDINE GLUCONATE 15 ML: 1.2 SOLUTION ORAL at 13:09

## 2025-01-01 RX ADMIN — IPRATROPIUM BROMIDE 0.5 MG: 0.5 SOLUTION RESPIRATORY (INHALATION) at 15:31

## 2025-01-01 RX ADMIN — CALCIUM CHLORIDE, MAGNESIUM CHLORIDE, SODIUM CHLORIDE, SODIUM BICARBONATE, POTASSIUM CHLORIDE AND SODIUM PHOSPHATE DIBASIC DIHYDRATE 12.5 ML/KG/HR: 3.68; 3.05; 6.34; 3.09; .314; .187 INJECTION INTRAVENOUS at 05:59

## 2025-01-01 RX ADMIN — VASOPRESSIN 2.4 UNITS/HR: 20 INJECTION INTRAVENOUS at 06:22

## 2025-01-01 RX ADMIN — PIPERACILLIN SODIUM AND TAZOBACTAM SODIUM 4.5 G: 4; .5 INJECTION, POWDER, LYOPHILIZED, FOR SOLUTION INTRAVENOUS at 01:57

## 2025-01-01 RX ADMIN — Medication 100 MG: at 08:57

## 2025-01-01 RX ADMIN — Medication 50 MCG/HR: at 11:36

## 2025-01-01 RX ADMIN — MIDAZOLAM HYDROCHLORIDE 2 MG: 1 INJECTION, SOLUTION INTRAMUSCULAR; INTRAVENOUS at 11:49

## 2025-01-01 RX ADMIN — CHLORHEXIDINE GLUCONATE 15 ML: 1.2 SOLUTION ORAL at 20:41

## 2025-01-01 RX ADMIN — CALCIUM CHLORIDE, MAGNESIUM CHLORIDE, SODIUM CHLORIDE, SODIUM BICARBONATE, POTASSIUM CHLORIDE AND SODIUM PHOSPHATE DIBASIC DIHYDRATE 12.5 ML/KG/HR: 3.68; 3.05; 6.34; 3.09; .314; .187 INJECTION INTRAVENOUS at 06:00

## 2025-01-01 RX ADMIN — DOBUTAMINE 2.5 MCG/KG/MIN: 12.5 INJECTION, SOLUTION, CONCENTRATE INTRAVENOUS at 14:38

## 2025-01-01 RX ADMIN — MIDAZOLAM HYDROCHLORIDE 5 MG/HR: 1 INJECTION, SOLUTION INTRAVENOUS at 03:09

## 2025-01-01 RX ADMIN — EPINEPHRINE 0.3 MCG/KG/MIN: 1 INJECTION INTRAMUSCULAR; INTRAVENOUS; SUBCUTANEOUS at 13:01

## 2025-01-01 RX ADMIN — HEPARIN SODIUM 800 UNITS/HR: 10000 INJECTION, SOLUTION INTRAVENOUS at 16:53

## 2025-01-01 RX ADMIN — NOREPINEPHRINE BITARTRATE 0.15 MCG/KG/MIN: 0.02 INJECTION, SOLUTION INTRAVENOUS at 11:59

## 2025-01-01 RX ADMIN — INSULIN ASPART 2 UNITS: 100 INJECTION, SOLUTION INTRAVENOUS; SUBCUTANEOUS at 20:02

## 2025-01-01 ASSESSMENT — ACTIVITIES OF DAILY LIVING (ADL)
ADLS_ACUITY_SCORE: 47
ADLS_ACUITY_SCORE: 41
ADLS_ACUITY_SCORE: 41
ADLS_ACUITY_SCORE: 47
ADLS_ACUITY_SCORE: 63
ADLS_ACUITY_SCORE: 59
ADLS_ACUITY_SCORE: 59
ADLS_ACUITY_SCORE: 41
ADLS_ACUITY_SCORE: 59
ADLS_ACUITY_SCORE: 63
ADLS_ACUITY_SCORE: 59
ADLS_ACUITY_SCORE: 41
ADLS_ACUITY_SCORE: 41
ADLS_ACUITY_SCORE: 59
ADLS_ACUITY_SCORE: 61
ADLS_ACUITY_SCORE: 63
ADLS_ACUITY_SCORE: 63
ADLS_ACUITY_SCORE: 59
ADLS_ACUITY_SCORE: 41
ADLS_ACUITY_SCORE: 41
ADLS_ACUITY_SCORE: 63
ADLS_ACUITY_SCORE: 41
ADLS_ACUITY_SCORE: 41
ADLS_ACUITY_SCORE: 59
ADLS_ACUITY_SCORE: 63
ADLS_ACUITY_SCORE: 59
ADLS_ACUITY_SCORE: 47
ADLS_ACUITY_SCORE: 61
ADLS_ACUITY_SCORE: 63

## 2025-01-01 ASSESSMENT — COLUMBIA-SUICIDE SEVERITY RATING SCALE - C-SSRS
1. IN THE PAST MONTH, HAVE YOU WISHED YOU WERE DEAD OR WISHED YOU COULD GO TO SLEEP AND NOT WAKE UP?: NO
6. HAVE YOU EVER DONE ANYTHING, STARTED TO DO ANYTHING, OR PREPARED TO DO ANYTHING TO END YOUR LIFE?: NO
2. HAVE YOU ACTUALLY HAD ANY THOUGHTS OF KILLING YOURSELF IN THE PAST MONTH?: NO

## 2025-02-15 DIAGNOSIS — F41.1 GAD (GENERALIZED ANXIETY DISORDER): ICD-10-CM

## 2025-02-18 RX ORDER — ALPRAZOLAM 0.5 MG
0.5 TABLET ORAL 2 TIMES DAILY PRN
Qty: 60 TABLET | Refills: 2 | Status: SHIPPED | OUTPATIENT
Start: 2025-02-18

## 2025-04-16 ENCOUNTER — OFFICE VISIT (OUTPATIENT)
Dept: FAMILY MEDICINE | Facility: CLINIC | Age: 80
End: 2025-04-16
Payer: COMMERCIAL

## 2025-04-16 VITALS
DIASTOLIC BLOOD PRESSURE: 74 MMHG | HEIGHT: 64 IN | TEMPERATURE: 97.8 F | OXYGEN SATURATION: 99 % | HEART RATE: 53 BPM | RESPIRATION RATE: 14 BRPM | WEIGHT: 164.3 LBS | BODY MASS INDEX: 28.05 KG/M2 | SYSTOLIC BLOOD PRESSURE: 128 MMHG

## 2025-04-16 DIAGNOSIS — I10 BENIGN ESSENTIAL HYPERTENSION: Chronic | ICD-10-CM

## 2025-04-16 DIAGNOSIS — E03.9 ACQUIRED HYPOTHYROIDISM: ICD-10-CM

## 2025-04-16 DIAGNOSIS — K21.9 GASTROESOPHAGEAL REFLUX DISEASE WITHOUT ESOPHAGITIS: ICD-10-CM

## 2025-04-16 DIAGNOSIS — F41.1 GAD (GENERALIZED ANXIETY DISORDER): ICD-10-CM

## 2025-04-16 DIAGNOSIS — N18.4 CKD (CHRONIC KIDNEY DISEASE) STAGE 4, GFR 15-29 ML/MIN (H): Primary | ICD-10-CM

## 2025-04-16 DIAGNOSIS — M79.7 FIBROMYALGIA: ICD-10-CM

## 2025-04-16 DIAGNOSIS — I25.10 CORONARY ARTERY DISEASE INVOLVING NATIVE CORONARY ARTERY OF NATIVE HEART WITHOUT ANGINA PECTORIS: ICD-10-CM

## 2025-04-16 DIAGNOSIS — R10.84 ABDOMINAL PAIN, GENERALIZED: ICD-10-CM

## 2025-04-16 LAB
ALBUMIN SERPL BCG-MCNC: 4.3 G/DL (ref 3.5–5.2)
ALP SERPL-CCNC: 80 U/L (ref 40–150)
ALT SERPL W P-5'-P-CCNC: 9 U/L (ref 0–50)
ANION GAP SERPL CALCULATED.3IONS-SCNC: 11 MMOL/L (ref 7–15)
AST SERPL W P-5'-P-CCNC: 16 U/L (ref 0–45)
BILIRUB SERPL-MCNC: 0.5 MG/DL
BUN SERPL-MCNC: 45.7 MG/DL (ref 8–23)
CALCIUM SERPL-MCNC: 10.4 MG/DL (ref 8.8–10.4)
CHLORIDE SERPL-SCNC: 104 MMOL/L (ref 98–107)
CHOLEST SERPL-MCNC: 240 MG/DL
CREAT SERPL-MCNC: 2.21 MG/DL (ref 0.51–0.95)
CREAT UR-MCNC: 62.5 MG/DL
EGFRCR SERPLBLD CKD-EPI 2021: 22 ML/MIN/1.73M2
ERYTHROCYTE [DISTWIDTH] IN BLOOD BY AUTOMATED COUNT: 13.8 % (ref 10–15)
FASTING STATUS PATIENT QL REPORTED: NO
FASTING STATUS PATIENT QL REPORTED: NO
FOLATE SERPL-MCNC: 8.1 NG/ML (ref 4.6–34.8)
GLUCOSE SERPL-MCNC: 97 MG/DL (ref 70–99)
HCO3 SERPL-SCNC: 25 MMOL/L (ref 22–29)
HCT VFR BLD AUTO: 33 % (ref 35–47)
HCYS SERPL-SCNC: 27.8 UMOL/L (ref 0–15)
HDLC SERPL-MCNC: 47 MG/DL
HGB BLD-MCNC: 11 G/DL (ref 11.7–15.7)
LDLC SERPL CALC-MCNC: 164 MG/DL
MCH RBC QN AUTO: 30.9 PG (ref 26.5–33)
MCHC RBC AUTO-ENTMCNC: 33.3 G/DL (ref 31.5–36.5)
MCV RBC AUTO: 93 FL (ref 78–100)
MICROALBUMIN UR-MCNC: 18.2 MG/L
MICROALBUMIN/CREAT UR: 29.12 MG/G CR (ref 0–25)
NONHDLC SERPL-MCNC: 193 MG/DL
PLATELET # BLD AUTO: 314 10E3/UL (ref 150–450)
POTASSIUM SERPL-SCNC: 5.4 MMOL/L (ref 3.4–5.3)
PROT SERPL-MCNC: 8.1 G/DL (ref 6.4–8.3)
RBC # BLD AUTO: 3.56 10E6/UL (ref 3.8–5.2)
SODIUM SERPL-SCNC: 140 MMOL/L (ref 135–145)
TRIGL SERPL-MCNC: 147 MG/DL
TSH SERPL DL<=0.005 MIU/L-ACNC: 0.52 UIU/ML (ref 0.3–4.2)
VIT B12 SERPL-MCNC: 524 PG/ML (ref 232–1245)
WBC # BLD AUTO: 11.8 10E3/UL (ref 4–11)

## 2025-04-16 PROCEDURE — G2211 COMPLEX E/M VISIT ADD ON: HCPCS | Performed by: INTERNAL MEDICINE

## 2025-04-16 PROCEDURE — 83090 ASSAY OF HOMOCYSTEINE: CPT | Performed by: INTERNAL MEDICINE

## 2025-04-16 PROCEDURE — 83921 ORGANIC ACID SINGLE QUANT: CPT | Performed by: INTERNAL MEDICINE

## 2025-04-16 PROCEDURE — 3074F SYST BP LT 130 MM HG: CPT | Performed by: INTERNAL MEDICINE

## 2025-04-16 PROCEDURE — 36415 COLL VENOUS BLD VENIPUNCTURE: CPT | Performed by: INTERNAL MEDICINE

## 2025-04-16 PROCEDURE — 82607 VITAMIN B-12: CPT | Performed by: INTERNAL MEDICINE

## 2025-04-16 PROCEDURE — 82043 UR ALBUMIN QUANTITATIVE: CPT | Performed by: INTERNAL MEDICINE

## 2025-04-16 PROCEDURE — 1126F AMNT PAIN NOTED NONE PRSNT: CPT | Performed by: INTERNAL MEDICINE

## 2025-04-16 PROCEDURE — 82746 ASSAY OF FOLIC ACID SERUM: CPT | Performed by: INTERNAL MEDICINE

## 2025-04-16 PROCEDURE — 99214 OFFICE O/P EST MOD 30 MIN: CPT | Performed by: INTERNAL MEDICINE

## 2025-04-16 PROCEDURE — 85027 COMPLETE CBC AUTOMATED: CPT | Performed by: INTERNAL MEDICINE

## 2025-04-16 PROCEDURE — 84443 ASSAY THYROID STIM HORMONE: CPT | Performed by: INTERNAL MEDICINE

## 2025-04-16 PROCEDURE — 3078F DIAST BP <80 MM HG: CPT | Performed by: INTERNAL MEDICINE

## 2025-04-16 PROCEDURE — 82570 ASSAY OF URINE CREATININE: CPT | Performed by: INTERNAL MEDICINE

## 2025-04-16 PROCEDURE — 80053 COMPREHEN METABOLIC PANEL: CPT | Performed by: INTERNAL MEDICINE

## 2025-04-16 PROCEDURE — 80061 LIPID PANEL: CPT | Performed by: INTERNAL MEDICINE

## 2025-04-16 PROCEDURE — 96127 BRIEF EMOTIONAL/BEHAV ASSMT: CPT | Performed by: INTERNAL MEDICINE

## 2025-04-16 RX ORDER — FAMOTIDINE 40 MG/1
40 TABLET, FILM COATED ORAL AT BEDTIME
Qty: 90 TABLET | Refills: 3 | Status: SHIPPED | OUTPATIENT
Start: 2025-04-16

## 2025-04-16 RX ORDER — DULOXETIN HYDROCHLORIDE 20 MG/1
20 CAPSULE, DELAYED RELEASE ORAL DAILY
Qty: 90 CAPSULE | Refills: 3 | Status: SHIPPED | OUTPATIENT
Start: 2025-04-16

## 2025-04-16 RX ORDER — LISINOPRIL AND HYDROCHLOROTHIAZIDE 12.5; 2 MG/1; MG/1
2 TABLET ORAL DAILY
Qty: 180 TABLET | Refills: 3 | Status: SHIPPED | OUTPATIENT
Start: 2025-04-16

## 2025-04-16 RX ORDER — ALPRAZOLAM 0.5 MG
0.5 TABLET ORAL 2 TIMES DAILY PRN
Qty: 60 TABLET | Refills: 5 | Status: SHIPPED | OUTPATIENT
Start: 2025-04-16

## 2025-04-16 RX ORDER — CARVEDILOL 6.25 MG/1
6.25 TABLET ORAL 2 TIMES DAILY WITH MEALS
Qty: 180 TABLET | Refills: 3 | Status: SHIPPED | OUTPATIENT
Start: 2025-04-16

## 2025-04-16 ASSESSMENT — ANXIETY QUESTIONNAIRES
5. BEING SO RESTLESS THAT IT IS HARD TO SIT STILL: NOT AT ALL
7. FEELING AFRAID AS IF SOMETHING AWFUL MIGHT HAPPEN: NOT AT ALL
7. FEELING AFRAID AS IF SOMETHING AWFUL MIGHT HAPPEN: NOT AT ALL
GAD7 TOTAL SCORE: 8
4. TROUBLE RELAXING: NEARLY EVERY DAY
3. WORRYING TOO MUCH ABOUT DIFFERENT THINGS: SEVERAL DAYS
IF YOU CHECKED OFF ANY PROBLEMS ON THIS QUESTIONNAIRE, HOW DIFFICULT HAVE THESE PROBLEMS MADE IT FOR YOU TO DO YOUR WORK, TAKE CARE OF THINGS AT HOME, OR GET ALONG WITH OTHER PEOPLE: SOMEWHAT DIFFICULT
1. FEELING NERVOUS, ANXIOUS, OR ON EDGE: NEARLY EVERY DAY
2. NOT BEING ABLE TO STOP OR CONTROL WORRYING: SEVERAL DAYS
GAD7 TOTAL SCORE: 8
GAD7 TOTAL SCORE: 8
8. IF YOU CHECKED OFF ANY PROBLEMS, HOW DIFFICULT HAVE THESE MADE IT FOR YOU TO DO YOUR WORK, TAKE CARE OF THINGS AT HOME, OR GET ALONG WITH OTHER PEOPLE?: SOMEWHAT DIFFICULT
6. BECOMING EASILY ANNOYED OR IRRITABLE: NOT AT ALL

## 2025-04-16 ASSESSMENT — PAIN SCALES - GENERAL: PAINLEVEL_OUTOF10: NO PAIN (0)

## 2025-04-16 ASSESSMENT — PATIENT HEALTH QUESTIONNAIRE - PHQ9: SUM OF ALL RESPONSES TO PHQ QUESTIONS 1-9: 12

## 2025-04-16 NOTE — PATIENT INSTRUCTIONS
Health Care Maintenance  OK for COVID and flu;    Fibromyalgia  Refill of duloxetine    Chronic Kidney Disease  Blood work today  Keep appointment in May with [facility]    ?COVID?  ?Stomach Flu?  Blood work today  If all normal, and symptoms persist another 1-2 weeks with little to no improvement, next step would be CT scan

## 2025-04-16 NOTE — PROGRESS NOTES
Assessment & Plan     CKD (chronic kidney disease) stage 4, GFR 15-29 ml/min (H)  Follows with [facility].  Check labs today  - Albumin Random Urine Quantitative with Creat Ratio; Future  - lisinopril-hydrochlorothiazide (ZESTORETIC) 20-12.5 MG tablet; Take 2 tablets by mouth daily.  - Vitamin B12; Future  - Methylmalonic Acid; Future  - Homocysteine; Future  - Folate; Future    Coronary artery disease involving native coronary artery of native heart without angina pectoris  Check lab  - Lipid panel reflex to direct LDL Non-fasting; Future    Acquired hypothyroidism  Due for lab  - TSH with free T4 reflex; Future    BRISSA (generalized anxiety disorder)   - stable, refill provided  - ALPRAZolam (XANAX) 0.5 MG tablet; Take 1 tablet (0.5 mg) by mouth 2 times daily as needed for anxiety.  - DULoxetine (CYMBALTA) 20 MG capsule; Take 1 capsule (20 mg) by mouth daily.  - Comprehensive metabolic panel (BMP + Alb, Alk Phos, ALT, AST, Total. Bili, TP); Future  - CBC with platelets; Future    Benign essential hypertension   - stable, refill provided  - carvedilol (COREG) 6.25 MG tablet; Take 1 tablet (6.25 mg) by mouth 2 times daily (with meals).  - lisinopril-hydrochlorothiazide (ZESTORETIC) 20-12.5 MG tablet; Take 2 tablets by mouth daily.    Fibromyalgia  Has helped improve pain.  Continue medication.  Unfortunately, energy level has not improved  - DULoxetine (CYMBALTA) 20 MG capsule; Take 1 capsule (20 mg) by mouth daily.    Gastroesophageal reflux disease without esophagitis  Reports metal taste in her mouth but otherwise reflux symptoms are controlled  - famotidine (PEPCID) 40 MG tablet; Take 1 tablet (40 mg) by mouth at bedtime.    Abdominal pain, generalized  Unclear cause, but could certainly be-run-of-the-mill gastroenteritis.  She feels that symptoms are continuing to improve and she is feeling nearly back to normal.  There is mild abdominal pain on exam but she is not overly concerned about her symptoms.  Start with  "blood work and if normal, continue to monitor symptoms.      BMI  Estimated body mass index is 28.2 kg/m  as calculated from the following:    Height as of this encounter: 1.626 m (5' 4\").    Weight as of this encounter: 74.5 kg (164 lb 4.8 oz).       Depression Screening Follow Up        4/16/2025    12:51 PM   PHQ   PHQ-9 Total Score 12   Q9: Thoughts of better off dead/self-harm past 2 weeks Not at all         4/16/2025    12:51 PM   Last PHQ-9   1.  Little interest or pleasure in doing things 2   2.  Feeling down, depressed, or hopeless 0   3.  Trouble falling or staying asleep, or sleeping too much 0   4.  Feeling tired or having little energy 3   5.  Poor appetite or overeating 3   6.  Feeling bad about yourself 0   7.  Trouble concentrating 1   8.  Moving slowly or restless 3   Q9: Thoughts of better off dead/self-harm past 2 weeks 0   PHQ-9 Total Score 12   Difficulty at work, home, or with people Not difficult at all         Follow Up Actions Taken  Patient counseled, no additional follow up at this time.             Health Care Maintenance  OK for COVID and flu;    Fibromyalgia  Refill of duloxetine    Chronic Kidney Disease  Blood work today  Keep appointment in May with [facility]    ?COVID?  ?Stomach Flu?  Blood work today  If all normal, and symptoms persist another 1-2 weeks with little to no improvement, next step would be CT scan    Lana Sheth is a 79 year old, presenting for the following health issues:  Hypertension, Thyroid Disease, Depression, Anxiety, Chronic Disease Management, Vascular Disease, and Health Maintenance (Wants to talk about flu and Covid if should get now )        4/16/2025    12:33 PM   Additional Questions   Roomed by dilia   Accompanied by self         4/16/2025    12:33 PM   Patient Reported Additional Medications   Patient reports taking the following new medications none     History of Present Illness       CKD: She is not using over the counter pain medicine. "     Mental Health Follow-up:  Patient presents to follow-up on Anxiety.    Patient's anxiety since last visit has been:  No change  The patient is not having other symptoms associated with anxiety.  Any significant life events: relationship concerns and health concerns  Patient is not feeling anxious or having panic attacks.  Patient has no concerns about alcohol or drug use.    Hypertension: She presents for follow up of hypertension.  She does check blood pressure  regularly outside of the clinic. Outside blood pressures have been over 140/90. She follows a low salt diet.     Hypothyroidism:     Since last visit, patient describes the following symptoms::  Anxiety, Fatigue, Hair loss, Loose stools and Weight loss    Weight loss::  5 lbs.    Vascular Disease:  She presents for follow up of vascular disease.     She never takes nitroglycerin. She takes daily aspirin.    She eats 0-1 servings of fruits and vegetables daily.She consumes 0 sweetened beverage(s) daily.She exercises with enough effort to increase her heart rate 30 to 60 minutes per day.  She exercises with enough effort to increase her heart rate 4 days per week.   She is taking medications regularly.        Chief Complaint   Patient presents with    Hypertension    Thyroid Disease    Depression    Anxiety    Chronic Disease Management    Vascular Disease    Health Maintenance     Wants to talk about flu and Covid if should get now      CKD4  -- She follows with [facility] nephrology, last visit in November.  --She has not been on SGLT2i as she is 79 and does not have significant proteinuria.   Vit D level is plenty high. She is taking 5000 units per day.  nephrology advised stopping this for now     Acute illness  --she reports probable COVID in jan - upper and lower respiratory symptoms, lost appetite and taste and smell.    --symptoms lasted 2 weeks or so.    --Symptoms resolved, then 1-2 weeks later developed the 'stomach flu' which lasted 3  days  --wonders if she should have covid and flu shot now.  Forget to get them in the fall  --has chronic gas/bloat, nausea, irregular bowel movements  --still having occ RLQ pain  --she reports ongoing improvement - increase in appetite, weight has increased, RLQ pain is much improved.    Wt Readings from Last 5 Encounters:   04/16/25 74.5 kg (164 lb 4.8 oz)   08/13/24 80.1 kg (176 lb 9.6 oz)   06/19/24 80.4 kg (177 lb 3.2 oz)   06/13/23 81.7 kg (180 lb 3.2 oz)   05/25/22 80.9 kg (178 lb 6.4 oz)         Fibromyalgia  Fatigue   She has had chronic fatigue for many years.  I felt that it was due to fibromyalgia and mental health.  Last visit in August I recommended increasing duloxetine from 20-30, but she opted to continue 20 mg  --She has had extensive workup including thyroid testing, B12, echo, Zio patch  --has not seen sleep doc  --no problems falling or staying asleep; is a night owl; however, has lifelong sleep onset insomnia  --she thinks pain is much improved with duloxetine;  sleep has improved.  Energy levels have not improved    Hypertension Follow-up    Do you check your blood pressure regularly outside of the clinic? Yes   Are you following a low salt diet? Yes  Are your blood pressures ever more than 140 on the top number (systolic) OR more than 90 on the bottom number (diastolic), for example 140/90? Yes    BP Readings from Last 2 Encounters:   04/16/25 128/74   08/13/24 124/76     Depression and Anxiety   How are you doing with your depression since your last visit? No change  How are you doing with your anxiety since your last visit?  No change  Are you having other symptoms that might be associated with depression or anxiety? No  Have you had a significant life event? Relationship Concerns and Health Concerns   Do you have any concerns with your use of alcohol or other drugs? No  Trial of zoloft, effexor, hydroxyzine buspar and lexapro failed due to both side effects and uncontrolled symptoms.   Well controlled on regular xanax use; patient strongly declines to try other medications  We have had many discussions over the years about risk of long-term benzo use  Under more stress;  son being considered for heart transplant    Social History     Tobacco Use    Smoking status: Every Day     Current packs/day: 0.50     Average packs/day: 0.5 packs/day for 62.3 years (31.1 ttl pk-yrs)     Types: Cigarettes     Start date: 1/1/1963    Smokeless tobacco: Never   Vaping Use    Vaping status: Never Used   Substance Use Topics    Alcohol use: No    Drug use: No         8/7/2019    11:41 AM 8/13/2024    12:46 PM 4/16/2025    12:51 PM   PHQ   PHQ-9 Total Score 7 4 12   Q9: Thoughts of better off dead/self-harm past 2 weeks Not at all Not at all Not at all         2/7/2019    10:28 AM 8/13/2024    12:46 PM 4/16/2025    12:36 PM   BRISSA-7 SCORE   Total Score   8 (mild anxiety)   Total Score 15 0 8        Patient-reported         4/16/2025    12:51 PM   Last PHQ-9   1.  Little interest or pleasure in doing things 2   2.  Feeling down, depressed, or hopeless 0   3.  Trouble falling or staying asleep, or sleeping too much 0   4.  Feeling tired or having little energy 3   5.  Poor appetite or overeating 3   6.  Feeling bad about yourself 0   7.  Trouble concentrating 1   8.  Moving slowly or restless 3   Q9: Thoughts of better off dead/self-harm past 2 weeks 0   PHQ-9 Total Score 12   Difficulty at work, home, or with people Not difficult at all         4/16/2025    12:36 PM   BRISSA-7    1. Feeling nervous, anxious, or on edge 3   2. Not being able to stop or control worrying 1   3. Worrying too much about different things 1   4. Trouble relaxing 3   5. Being so restless that it is hard to sit still 0   6. Becoming easily annoyed or irritable 0   7. Feeling afraid, as if something awful might happen 0   BRISSA-7 Total Score 8    If you checked any problems, how difficult have they made it for you to do your work, take care of  things at home, or get along with other people? Somewhat difficult       Patient-reported       Suicide Assessment Five-step Evaluation and Treatment (SAFE-T)    Hypothyroidism Follow-up    Since last visit, patient describes the following symptoms: weight loss of 5 lbs, loose stools, anxiety, fatigue, and hair loss    How many servings of fruits and vegetables do you eat daily?  0-1  On average, how many sweetened beverages do you drink each day (Examples: soda, juice, sweet tea, etc.  Do NOT count diet or artificially sweetened beverages)?   0  How many days per week do you exercise enough to make your heart beat faster? 4  How many minutes a day do you exercise enough to make your heart beat faster? 30 - 60  How many days per week do you miss taking your medication? 0  Vascular Disease Follow-up    How often do you take nitroglycerin? Never  Do you take an aspirin every day? Yes    Chronic Kidney Disease Follow-up    Do you take any over the counter pain medicine?: No      Current Outpatient Medications   Medication Sig Dispense Refill    ALPRAZolam (XANAX) 0.5 MG tablet TAKE 1 TABLET(0.5 MG) BY MOUTH TWICE DAILY AS NEEDED FOR ANXIETY 60 tablet 2    aspirin 81 MG tablet Take  by mouth daily.  3    carvedilol (COREG) 6.25 MG tablet Take 1 tablet (6.25 mg) by mouth 2 times daily (with meals) 180 tablet 3    Cranberry 1000 MG CAPS       DULoxetine (CYMBALTA) 20 MG capsule Take 1 capsule (20 mg) by mouth daily 90 capsule 3    famotidine (PEPCID) 40 MG tablet Take 1 tablet (40 mg) by mouth at bedtime 90 tablet 3    ferrous sulfate (FEROSUL) 325 (65 Fe) MG tablet Take 325 mg by mouth daily (with breakfast)      Krill Oil 300 MG CAPS Take 350 mg by mouth daily      levothyroxine (SYNTHROID/LEVOTHROID) 88 MCG tablet Take 1 tablet (88 mcg) by mouth daily 90 tablet 3    lisinopril-hydrochlorothiazide (ZESTORETIC) 20-12.5 MG tablet Take 2 tablets by mouth daily 180 tablet 3         Objective    /74 (BP Location: Left  "arm, Patient Position: Sitting, Cuff Size: Adult Regular)   Pulse 53   Temp 97.8  F (36.6  C) (Tympanic)   Resp 14   Ht 1.626 m (5' 4\")   Wt 74.5 kg (164 lb 4.8 oz)   SpO2 99%   BMI 28.20 kg/m    Body mass index is 28.2 kg/m .  Physical Exam   GENERAL: alert and no distress  NECK: no adenopathy, no asymmetry, masses, or scars  RESP: lungs clear to auscultation - no rales, rhonchi or wheezes  CV: regular rate and rhythm, normal S1 S2, no S3 or S4, no murmur, click or rub, no peripheral edema   ABDOMEN: normal bowel sounds.  No organomegaly.  Mild epigastric and RLQ pain without rebound or guarding  PSYCH: mentation appears normal, affect normal/bright            Signed Electronically by: Nora Hernandez DO    "

## 2025-04-16 NOTE — LETTER
April 17, 2025      Meryl INGRAM Shantanu  [address] [facility] [id number]        Dear MsMagnusShantanu,    We are writing to inform you of your test results.    B12, folic acid are normal.     Component      Latest Ref Rng 4/16/2025  1:31 PM   Vitamin B12      232 - 1,245 pg/mL 524    Folate      4.6 - 34.8 ng/mL 8.1          If you have any questions or concerns, please call the clinic at the number listed above.       Sincerely,      Nora Hernandez, DO    Electronically signed

## 2025-04-29 ENCOUNTER — LAB (OUTPATIENT)
Dept: LAB | Facility: CLINIC | Age: 80
End: 2025-04-29
Payer: COMMERCIAL

## 2025-04-29 DIAGNOSIS — N18.4 CKD (CHRONIC KIDNEY DISEASE) STAGE 4, GFR 15-29 ML/MIN (H): ICD-10-CM

## 2025-04-29 LAB
ANION GAP SERPL CALCULATED.3IONS-SCNC: 10 MMOL/L (ref 7–15)
BASOPHILS # BLD AUTO: 0.1 10E3/UL (ref 0–0.2)
BASOPHILS NFR BLD AUTO: 1 %
BUN SERPL-MCNC: 30.3 MG/DL (ref 8–23)
CALCIUM SERPL-MCNC: 10.3 MG/DL (ref 8.8–10.4)
CHLORIDE SERPL-SCNC: 102 MMOL/L (ref 98–107)
CREAT SERPL-MCNC: 2.23 MG/DL (ref 0.51–0.95)
EGFRCR SERPLBLD CKD-EPI 2021: 22 ML/MIN/1.73M2
EOSINOPHIL # BLD AUTO: 1.9 10E3/UL (ref 0–0.7)
EOSINOPHIL NFR BLD AUTO: 17 %
ERYTHROCYTE [DISTWIDTH] IN BLOOD BY AUTOMATED COUNT: 13.6 % (ref 10–15)
GLUCOSE SERPL-MCNC: 100 MG/DL (ref 70–99)
HCO3 SERPL-SCNC: 25 MMOL/L (ref 22–29)
HCT VFR BLD AUTO: 33.8 % (ref 35–47)
HGB BLD-MCNC: 11.1 G/DL (ref 11.7–15.7)
IMM GRANULOCYTES # BLD: 0 10E3/UL
IMM GRANULOCYTES NFR BLD: 0 %
LYMPHOCYTES # BLD AUTO: 3 10E3/UL (ref 0.8–5.3)
LYMPHOCYTES NFR BLD AUTO: 27 %
MCH RBC QN AUTO: 30.5 PG (ref 26.5–33)
MCHC RBC AUTO-ENTMCNC: 32.8 G/DL (ref 31.5–36.5)
MCV RBC AUTO: 93 FL (ref 78–100)
MONOCYTES # BLD AUTO: 0.7 10E3/UL (ref 0–1.3)
MONOCYTES NFR BLD AUTO: 7 %
NEUTROPHILS # BLD AUTO: 5.5 10E3/UL (ref 1.6–8.3)
NEUTROPHILS NFR BLD AUTO: 49 %
PLATELET # BLD AUTO: 281 10E3/UL (ref 150–450)
POTASSIUM SERPL-SCNC: 5.1 MMOL/L (ref 3.4–5.3)
RBC # BLD AUTO: 3.64 10E6/UL (ref 3.8–5.2)
SODIUM SERPL-SCNC: 137 MMOL/L (ref 135–145)
WBC # BLD AUTO: 11.3 10E3/UL (ref 4–11)

## 2025-04-29 PROCEDURE — 36415 COLL VENOUS BLD VENIPUNCTURE: CPT

## 2025-04-29 PROCEDURE — 85025 COMPLETE CBC W/AUTO DIFF WBC: CPT

## 2025-04-29 PROCEDURE — 80048 BASIC METABOLIC PNL TOTAL CA: CPT

## 2025-05-05 LAB — METHYLMALONATE SERPL-SCNC: 0.36 UMOL/L (ref 0–0.4)

## 2025-05-20 ENCOUNTER — PATIENT OUTREACH (OUTPATIENT)
Dept: CARE COORDINATION | Facility: CLINIC | Age: 80
End: 2025-05-20
Payer: COMMERCIAL

## 2025-06-03 ENCOUNTER — PATIENT OUTREACH (OUTPATIENT)
Dept: CARE COORDINATION | Facility: CLINIC | Age: 80
End: 2025-06-03
Payer: COMMERCIAL

## 2025-06-23 DIAGNOSIS — E03.9 ACQUIRED HYPOTHYROIDISM: ICD-10-CM

## 2025-06-23 RX ORDER — LEVOTHYROXINE SODIUM 88 UG/1
88 TABLET ORAL DAILY
Qty: 90 TABLET | Refills: 2 | Status: SHIPPED | OUTPATIENT
Start: 2025-06-23

## 2025-08-10 PROBLEM — Z98.61 PERCUTANEOUS TRANSLUMINAL CORONARY ANGIOPLASTY STATUS: Status: ACTIVE | Noted: 2025-01-01

## 2025-08-10 PROBLEM — I21.3 ST ELEVATION MYOCARDIAL INFARCTION (STEMI), UNSPECIFIED ARTERY (H): Status: ACTIVE | Noted: 2025-01-01

## 2025-08-12 LAB
BACTERIA SPEC CULT: ABNORMAL

## 2025-08-13 LAB
BACTERIA ASPIRATE CULT: ABNORMAL
BACTERIA ASPIRATE CULT: ABNORMAL
GRAM STAIN RESULT: ABNORMAL
GRAM STAIN RESULT: ABNORMAL

## 2025-08-18 LAB
ATRIAL RATE - MUSE: 112 BPM
ATRIAL RATE - MUSE: 112 BPM
DIASTOLIC BLOOD PRESSURE - MUSE: 55 MMHG
DIASTOLIC BLOOD PRESSURE - MUSE: 57 MMHG
INTERPRETATION ECG - MUSE: NORMAL
INTERPRETATION ECG - MUSE: NORMAL
P AXIS - MUSE: 70 DEGREES
P AXIS - MUSE: 74 DEGREES
PR INTERVAL - MUSE: 164 MS
PR INTERVAL - MUSE: 166 MS
QRS DURATION - MUSE: 102 MS
QRS DURATION - MUSE: 98 MS
QT - MUSE: 336 MS
QT - MUSE: 338 MS
QTC - MUSE: 458 MS
QTC - MUSE: 461 MS
R AXIS - MUSE: -58 DEGREES
R AXIS - MUSE: -61 DEGREES
SYSTOLIC BLOOD PRESSURE - MUSE: 109 MMHG
SYSTOLIC BLOOD PRESSURE - MUSE: 112 MMHG
T AXIS - MUSE: 100 DEGREES
T AXIS - MUSE: 104 DEGREES
VENTRICULAR RATE- MUSE: 112 BPM
VENTRICULAR RATE- MUSE: 112 BPM

## (undated) DEVICE — INTRO MICRO MINI STICK 4FR STIFF NITINOL 45-753

## (undated) DEVICE — VALVE HEMOSTATIC WATCHDOG 8FR INNER LUMEN H74939343021

## (undated) DEVICE — CATH PULM ART 7FR X 110CM, SWA

## (undated) DEVICE — SOL WATER IRRIG 1000ML BOTTLE 07139-09

## (undated) DEVICE — CATH BALLOON EMERGE 2.5X12MM H7493918912250

## (undated) DEVICE — CATH BALLOON EMERGE 2.0X8MM H7493918908200

## (undated) DEVICE — KIT HAND CONTROL ACIST 014644 AR-P54

## (undated) DEVICE — SHTH INTRO 0.021IN ID 6FR DIA

## (undated) DEVICE — GUIDEWIRE VASC 0.014INX180CM RUNTHROUGH 25-1011

## (undated) DEVICE — CLOSURE DEVICE 6FR VASC PROGLIDE MEDICATED SUTURE 12673-03

## (undated) DEVICE — ELECTRODE DEFIB CADENCE 22550R

## (undated) DEVICE — Device

## (undated) DEVICE — CATH LAUNCHER 6FR JR 4.0 LA6JR40

## (undated) DEVICE — CUSTOM PACK CORONARY SAN5BCRHEA

## (undated) DEVICE — CATH ANGIO INFINITI PIGTAIL 155 6 SH 6FRX110CM 534654S

## (undated) DEVICE — SOL NACL 0.9% IRRIG 1000ML BOTTLE 07138-09

## (undated) DEVICE — CATH DUAL ACCESS TWIN PASS 5200

## (undated) DEVICE — CATH BALLOON NC EMERGE 3.50X20MM H7493926720350

## (undated) DEVICE — SYR ANGIOGRAPHY MULTIUSE KIT ACIST 014612

## (undated) DEVICE — CATH ANGIO JUDKINS R4 6FRX100CM INFINITI 534621T

## (undated) DEVICE — DEVICE INFLATION SYR W/ HEMOSTASIS VALVE 12IN EXT IN4904

## (undated) DEVICE — CATH ANGIO JUDKINS JL4 6FRX100CM INFINITI 534620T

## (undated) DEVICE — MANIFOLD KIT ANGIO AUTOMATED 014613

## (undated) DEVICE — INTRO SHEATH 6FRX10CM PINNACLE RSS602

## (undated) DEVICE — CATH LAUNCHER 6FR LA6EBU375

## (undated) DEVICE — CATH BALLOON EMERGE 4.0X20MM H7493918920400

## (undated) DEVICE — INTRO SHEATH 7FRX10CM PINNACLE RSS702

## (undated) DEVICE — CATH IMPELLA 3.5 CP PUMP SET 4000021

## (undated) DEVICE — CATH BALLOON EMERGE 2.5X15MM H7493918915250

## (undated) DEVICE — CATH BALLOON EMERGE 3.5X20MM H7493918920350

## (undated) RX ORDER — PHENYLEPHRINE HYDROCHLORIDE 25 MG/ML
SOLUTION/ DROPS OPHTHALMIC
Status: DISPENSED
Start: 2019-11-18

## (undated) RX ORDER — DOPAMINE HYDROCHLORIDE 160 MG/100ML
INJECTION, SOLUTION INTRAVENOUS
Status: DISPENSED
Start: 2025-01-01

## (undated) RX ORDER — ONDANSETRON 2 MG/ML
INJECTION INTRAMUSCULAR; INTRAVENOUS
Status: DISPENSED
Start: 2025-01-01

## (undated) RX ORDER — TROPICAMIDE 10 MG/ML
SOLUTION/ DROPS OPHTHALMIC
Status: DISPENSED
Start: 2019-11-18

## (undated) RX ORDER — EPTIFIBATIDE 2 MG/ML
INJECTION, SOLUTION INTRAVENOUS
Status: DISPENSED
Start: 2025-01-01

## (undated) RX ORDER — PHENYLEPHRINE HYDROCHLORIDE 25 MG/ML
SOLUTION/ DROPS OPHTHALMIC
Status: DISPENSED
Start: 2019-10-28

## (undated) RX ORDER — EPINEPHRINE 0.1 MG/ML
INJECTION INTRAVENOUS
Status: DISPENSED
Start: 2025-01-01

## (undated) RX ORDER — FUROSEMIDE 10 MG/ML
INJECTION INTRAMUSCULAR; INTRAVENOUS
Status: DISPENSED
Start: 2025-01-01

## (undated) RX ORDER — INDOMETHACIN 25 MG/1
CAPSULE ORAL
Status: DISPENSED
Start: 2025-01-01

## (undated) RX ORDER — CYCLOPENTOLATE HYDROCHLORIDE 10 MG/ML
SOLUTION/ DROPS OPHTHALMIC
Status: DISPENSED
Start: 2019-10-28

## (undated) RX ORDER — NOREPINEPHRINE BITARTRATE 0.02 MG/ML
INJECTION, SOLUTION INTRAVENOUS
Status: DISPENSED
Start: 2025-01-01

## (undated) RX ORDER — TROPICAMIDE 10 MG/ML
SOLUTION/ DROPS OPHTHALMIC
Status: DISPENSED
Start: 2019-10-28

## (undated) RX ORDER — DEXMEDETOMIDINE HYDROCHLORIDE 4 UG/ML
INJECTION, SOLUTION INTRAVENOUS
Status: DISPENSED
Start: 2025-01-01

## (undated) RX ORDER — FENTANYL CITRATE 50 UG/ML
INJECTION, SOLUTION INTRAMUSCULAR; INTRAVENOUS
Status: DISPENSED
Start: 2025-01-01

## (undated) RX ORDER — CYCLOPENTOLATE HYDROCHLORIDE 10 MG/ML
SOLUTION/ DROPS OPHTHALMIC
Status: DISPENSED
Start: 2019-11-18